# Patient Record
Sex: FEMALE | Race: WHITE | NOT HISPANIC OR LATINO | Employment: OTHER | URBAN - METROPOLITAN AREA
[De-identification: names, ages, dates, MRNs, and addresses within clinical notes are randomized per-mention and may not be internally consistent; named-entity substitution may affect disease eponyms.]

---

## 2017-02-27 ENCOUNTER — GENERIC CONVERSION - ENCOUNTER (OUTPATIENT)
Dept: OTHER | Facility: OTHER | Age: 67
End: 2017-02-27

## 2017-02-27 ENCOUNTER — HOSPITAL ENCOUNTER (OUTPATIENT)
Dept: RADIOLOGY | Facility: HOSPITAL | Age: 67
Discharge: HOME | End: 2017-02-27
Attending: OBSTETRICS & GYNECOLOGY
Payer: MEDICARE

## 2017-02-27 DIAGNOSIS — Z12.39 ENCOUNTER FOR OTHER SCREENING FOR MALIGNANT NEOPLASM OF BREAST: ICD-10-CM

## 2017-02-27 PROCEDURE — G0202 SCR MAMMO BI INCL CAD: HCPCS

## 2018-01-10 NOTE — RESULT NOTES
Verified Results  * MAMMO SCREENING BILATERAL W CAD [id number] 09:55AM [facility] Order Number: [id number]    - Patient Instructions: To schedule this appointment, please contact Central Scheduling at [phone]  Do not wear any perfume, powder, lotion or deodorant on breast or underarm area  Please bring your doctors order, referral (if needed) and insurance information with you on the day of the test  Failure to bring this information may result in this test being rescheduled  Arrive 15 minutes prior to your appointment time to register  On the day of your test, please bring any prior mammogram or breast studies with you that were not performed at a [facility]  Failure to bring prior exams may result in your test needing to be rescheduled  Test Name Result Flag Reference   MAMMO SCREENING BILATERAL W CAD (Report)     Patient History:   Patient is postmenopausal    Family history of breast cancer at age 59 in maternal aunt,    breast cancer at age 79 in maternal grandmother, colorectal    cancer at age 64 in father  Benign excisional biopsy of the left breast, June 1998  Patient has never smoked  Patient's BMI is 28 3  Reason for exam: screening, asymptomatic  Mammo Screening Bilateral W CAD: February 27, 2017 - Check In #:    [de-identified]   Bilateral MLO, CC, and XCCL view(s) were taken  Technologist: Danford Severin, RT(LINDA)(M)   Prior study comparison: February 25, 2016, mammo screening    bilateral W CAD performed at [address] [place] [address]     January 21, 2015, bilateral digital screening mammogram performed   at [address] [place] [address]  January 14, 2014, bilateral    digital screening mammogram performed at [address] [place] [address]      The breast tissue is heterogeneously dense, potentially limiting    the sensitivity of mammography   Patient risk, included in this    report, assists in determining the appropriate screening regimen    (such as 3-D mammography or the inclusion of automated breast    ultrasound or MRI)  3-D mammography may also remain indicated as    screening  No dominant soft tissue mass, architectural distortion or    suspicious calcifications are noted in either breast   The skin    and nipple contours are within normal limits  No evidence of malignancy  No significant changes when compared with prior studies  ACR BI-RADSï¾® Assessments: BiRad:1 - Negative     Recommendation:   Routine screening mammogram of both breasts in 1 year  A    reminder letter will be scheduled  Analyzed by CAD     8-10% of cancers will be missed on mammography  Management of a    palpable abnormality must be based on clinical grounds  Patients   will be notified of their results via letter from our facility  Accredited by [employer] and FDA       Transcription Location: [facility] 98: [id number]     Risk Value(s):   Tyrer-Cuzick 10 Year: 2 100%, Tyrer-Cuzick Lifetime: 4 400%,    Myriad Table: 1 5%, JASVIR 5 Year: 1 7%, NCI Lifetime: 6 5%   Signed by:   Niurka Joshi MD   2/27/17

## 2018-01-13 NOTE — RESULT NOTES
Verified Results  * MAMMO SCREENING BILATERAL W CAD [id number] 10:30AM Javier Fremont     Test Name Result Flag Reference   MAMMO SCREENING BILATERAL W CAD (Report)     Patient History:   Patient is postmenopausal    Family history of colorectal cancer in father at age 64, breast    cancer in maternal aunt at age 59, and breast cancer in maternal    grandmother at age 79  Benign excisional biopsy of the left breast, June 1998  Patient has never smoked  Patient's BMI is 28 3  Reason for exam: screening (asymptomatic)  Mammo Screening Bilateral W CAD: February 25, 2016 - Check In #:    [de-identified]   Bilateral CC and XCCL view(s) were taken  MLO view(s) were taken   of the right breast      Technologist: RT Johann(LINDA)(M)   Prior study comparison: January 21, 2015, bilateral digital    screening mammogram performed at [address] [place] [address]     January 14, 2014, bilateral digital screening mammogram    performed at [address] [place] [address]  November 29, 2012,    bilateral digital screening mammogram performed at [address] [place] [address]  October 25, 2011, bilateral digital    screening mammogram performed at [address] [place] [address]     October 5, 2010, bilateral digital screening mammogram performed   at [address] [place] [address]  September 22, 2009,    bilateral digital screening mammogram performed at [address] [place] [address]  September 9, 2008, bilateral DIGITAL    SCREENING MAMMOGRAM performed at [address] [place] [address]     August 17, 2007, bilateral digital screening mammo w/CAD    performed at [address] [place] [address]      There are scattered fibroglandular densities  No dominant soft tissue mass, architectural distortion or    suspicious calcifications are noted  The skin and nipple    contours are within normal limits  No evidence of malignancy  No significant changes   when compared with prior studies       ASSESSMENT: BiRad:1 - Negative     Recommendation:   Routine screening mammogram of both breasts in 1 year  A    reminder letter will be scheduled  Analyzed by CAD     8-10% of cancers will be missed on mammography  Management of a    palpable abnormality must be based on clinical grounds  Patients   will be notified of their results via letter from our facility  Accredited by [employer] and FDA       Transcription Location: [facility] 98: [id number]     Risk Value(s):   Tyrer-Cuzick 10 Year: 2 146%, Tyrer-Cuzick Lifetime: 4 665%,    Myriad Table: 1 5%, JASVIR 5 Year: 1 7%, NCI Lifetime: 6 7%   Signed by:   Vivian Connell MD   2/25/16

## 2018-08-07 ENCOUNTER — OFFICE VISIT (OUTPATIENT)
Dept: HEMATOLOGY ONCOLOGY | Facility: CLINIC | Age: 68
End: 2018-08-07
Payer: MEDICARE

## 2018-08-07 VITALS
OXYGEN SATURATION: 99 % | RESPIRATION RATE: 17 BRPM | WEIGHT: 144 LBS | HEART RATE: 76 BPM | SYSTOLIC BLOOD PRESSURE: 124 MMHG | TEMPERATURE: 98 F | BODY MASS INDEX: 31.07 KG/M2 | DIASTOLIC BLOOD PRESSURE: 80 MMHG | HEIGHT: 57 IN

## 2018-08-07 DIAGNOSIS — D64.9 ANEMIA, UNSPECIFIED TYPE: Primary | ICD-10-CM

## 2018-08-07 DIAGNOSIS — D47.1 MYELOPROLIFERATIVE DISORDER (HCC): ICD-10-CM

## 2018-08-07 DIAGNOSIS — D75.839 THROMBOCYTOSIS: ICD-10-CM

## 2018-08-07 PROCEDURE — 99204 OFFICE O/P NEW MOD 45 MIN: CPT | Performed by: INTERNAL MEDICINE

## 2018-08-07 RX ORDER — ESOMEPRAZOLE MAGNESIUM 40 MG/1
CAPSULE, DELAYED RELEASE ORAL
COMMUNITY

## 2018-08-07 RX ORDER — AMLODIPINE BESYLATE 5 MG/1
TABLET ORAL
Refills: 0 | COMMUNITY
Start: 2018-07-26 | End: 2020-02-29

## 2018-08-07 RX ORDER — TELMISARTAN 80 MG/1
40 TABLET ORAL
COMMUNITY
End: 2020-04-06

## 2018-08-07 RX ORDER — BACLOFEN 10 MG/1
20 TABLET ORAL
COMMUNITY
End: 2020-04-06 | Stop reason: ALTCHOICE

## 2018-08-07 RX ORDER — B-COMPLEX WITH VITAMIN C
TABLET ORAL
COMMUNITY

## 2018-08-07 RX ORDER — OXYCODONE AND ACETAMINOPHEN 7.5; 325 MG/1; MG/1
TABLET ORAL
Refills: 0 | COMMUNITY
Start: 2018-07-11 | End: 2021-11-08 | Stop reason: ALTCHOICE

## 2018-08-07 RX ORDER — POTASSIUM CHLORIDE 750 MG/1
CAPSULE, EXTENDED RELEASE ORAL
COMMUNITY
End: 2020-04-06 | Stop reason: SDUPTHER

## 2018-08-07 RX ORDER — MORPHINE SULFATE 60 MG/1
TABLET, FILM COATED, EXTENDED RELEASE ORAL
Refills: 0 | COMMUNITY
Start: 2018-07-11 | End: 2020-04-20 | Stop reason: ALTCHOICE

## 2018-08-07 RX ORDER — LEVOTHYROXINE SODIUM 0.05 MG/1
TABLET ORAL
Refills: 3 | COMMUNITY
Start: 2018-07-26 | End: 2020-04-06 | Stop reason: SDUPTHER

## 2018-08-07 RX ORDER — ZOLPIDEM TARTRATE 5 MG/1
TABLET ORAL
COMMUNITY

## 2018-08-07 RX ORDER — ROSUVASTATIN CALCIUM 5 MG/1
TABLET, COATED ORAL
COMMUNITY
End: 2020-04-06 | Stop reason: ALTCHOICE

## 2018-08-07 RX ORDER — CELECOXIB 200 MG/1
CAPSULE ORAL
COMMUNITY
End: 2022-05-09 | Stop reason: SDUPTHER

## 2018-08-07 NOTE — LETTER
August 7, 2018     Chiquis Mcclellan MD  [address] [address]    Patient: Vanda Resendiz   YOB: 1950   Date of Visit: 8/7/2018       Dear Dr Alli Little: Thank you for referring Adithya Merino to me for evaluation  Below are my notes for this consultation  If you have questions, please do not hesitate to call me  I look forward to following your patient along with you  Sincerely,        James Ward MD        CC: DO James Scott MD  8/7/2018  3:01 PM  Sign at close encounter  Consultation - Medical Oncology   Vanda Resendiz 79 y o  female MRN: [id number]  Unit/Bed#:  Encounter: [id number]     Referring physician:  Chiquis Mcclellan  Reason for Consult:   Thrombocytosis  HPI: Vanda Resendiz is a 79y o  year old female   She is here with her   She gives history of high platelet count at least for the last 6 years when she was seen by Dr Claudene Birch in [place]  She did not have any treatment  No history of blood clot or bleeding  There is family history of high platelet counts in her 2 sisters  Patient feels tired  She has osteoarthritis throughout her body and lumbar stenosis  She has back problem for the last 20 years  Surgery was advised 20 years ago  She had injections and was under care of pain management specialist   She ambulates with a walker  Long time ago she had hernia surgery and hammer toe surgery  She had tonsillectomy  In 1985 she had ANN for bleeding  She still has the ovaries  In 1990 a benign lump was removed from left breast   She follows with her gynecologist and he does examination of breasts and also sends her for mammography  In addition she has history of hypertension dyslipidemia and hypo thyroidism      ROS:  08/07/18 Reviewed 13 systems:  Presently no headaches, seizures, dizziness, diplopia, dysphagia, hoarseness, chest pain, palpitations, shortness of breath, cough, hemoptysis, abdominal pain, nausea, vomiting, change in bowel habits, melena, hematuria, fever, chills, bleeding, bone pains, skin rash, weight loss,  weakness, numbness, claudication   No frequent infections  Not unusually sensitive to heat or cold  No swelling of the ankles  No swollen glands  Patient is anxious    No GYN symptoms at present Other symptoms are in HPI        Historical Information   Past Medical History:   Diagnosis Date    Arthritis     Hernia, umbilical     Hypertension     Spinal stenosis      Past Surgical History:   Procedure Laterality Date    FOOT SURGERY      HYSTERECTOMY       Social History   History   Alcohol Use No     History   Drug Use No     History   Smoking Status    Never Smoker   Smokeless Tobacco    Never Used     Family History:   Family History   Problem Relation Age of Onset    Hypertension Mother     Cancer Father          Current Outpatient Prescriptions:     amLODIPine (NORVASC) 5 mg tablet, TK 1 T PO QD, Disp: , Rfl: 0    baclofen 10 mg tablet, Take by mouth, Disp: , Rfl:     Calcium Carbonate-Vitamin D 600-200 MG-UNIT TABS, Take by mouth, Disp: , Rfl:     celecoxib (CELEBREX) 200 mg capsule, Take by mouth, Disp: , Rfl:     Cholecalciferol (VITAMIN D PO), Take 5,000 Units by mouth, Disp: , Rfl:     denosumab (PROLIA) 60 mg/mL, Inject under the skin, Disp: , Rfl:     esomeprazole (NEXIUM) 40 MG capsule, Take by mouth, Disp: , Rfl:     levothyroxine 50 mcg tablet, TK 1 T PO QD UTD, Disp: , Rfl: 3    morphine (MS CONTIN) 60 mg 12 hr tablet, TK 1 T PO Q 8 HOURS PO PRN, Disp: , Rfl: 0    Multiple Vitamins-Minerals (CENTRUM SILVER 50+WOMEN) TABS, Take by mouth, Disp: , Rfl:     oxyCODONE-acetaminophen (PERCOCET) 7 5-325 MG per tablet, TK 1 TO 2 TS PO Q 4 TO 6 H PRN P, Disp: , Rfl: 0    potassium chloride (MICRO-K) 10 MEQ CR capsule, Take by mouth, Disp: , Rfl:     rosuvastatin (CRESTOR) 5 mg tablet, Take by mouth, Disp: , Rfl:     telmisartan (MICARDIS) 80 MG tablet, Take by mouth, Disp: , Rfl:     zolpidem (AMBIEN) 5 mg tablet, Take by mouth, Disp: , Rfl:     Allergies   Allergen Reactions    Aspirin     Ibuprofen      @ ROS@  Physical Exam:  Vitals:    08/07/18 1400   BP: 124/80   BP Location: Right arm   Cuff Size: Adult   Pulse: 76   Resp: 17   Temp: 98 °F (36 7 °C)   TempSrc: Tympanic   SpO2: 99%   Weight: 65 3 kg (144 lb)   Height: 4' 9" (1 448 m)     Alert, oriented, not in distress, no icterus, no oral thrush, no palpable neck mass, clear lung fields, regular heart rate, abdomen  soft and non tender, no palpable abdominal mass, no ascites, no edema of ankles, no calf tenderness, no focal neurological deficit, no skin rash, no palpable lymphadenopathy, good arterial pulses, no clubbing  Patient is anxious  Performance status  3  She ambulates with a walker  Lab Results: I have reviewed all pertinent labs  LABS:   iIn October 2017 hemoglobin 10 7  MCV 91 2  WBC 7700  Platelets [id number]  Imaging Studies: I have personally reviewed pertinent reports  Pathology, and Other Studies: I have personally reviewed pertinent reports  Assessment and Plan:    Thrombocytosis and possibility of myeloproliferative disorder  Anemia  Discussed thrombocytosis in detail, primary versus secondary and causes of secondary thrombocytosis and in general terms discussed treatment for primary thrombocytosis that is what I am  Suspecting, primary thrombocytosis  See orders  CT scan and not ultrasound of the abdomen because she will not be able to lie on her back for too long and ultra sound takes longer than CT scan  All discussed in detail  Questions answered  If  primary thrombocytosis then goal will be to bring platelet count close to 400,000  See below  1  Anemia, unspecified type    - CBC and differential; Future  - Comprehensive metabolic panel; Future  - C-reactive protein; Future  - Sedimentation rate, automated; Future  - LD,Blood; Future  - Iron Panel;  Future  - Reticulocytes; Future  - Occult Bloood,Fecal Immunochemical; Future    2  Thrombocytosis (HCC)    - CBC and differential; Future  - Comprehensive metabolic panel; Future  - C-reactive protein; Future  - Sedimentation rate, automated; Future  - LD,Blood; Future  - CT chest abdomen pelvis wo contrast; Future  - JAK2 V617F,Ql,W/RFL Exons 12,13 and MPL X916,A037; Future    3  Myeloproliferative disorder (HCC)    - CBC and differential; Future  - Comprehensive metabolic panel; Future  - C-reactive protein; Future  - Sedimentation rate, automated; Future  - LD,Blood; Future  - CT chest abdomen pelvis wo contrast; Future  - JAK2 V617F,Ql,W/RFL Exons 12,13 and MPL S571,W151; Future    Patient voiced understanding and agreement in the discussion  Counseling / Coordination of Care: 75 min    Greater than 50% of total time was spent with the patient and / or family counseling and / or coordination of care

## 2018-08-07 NOTE — PROGRESS NOTES
Consultation - Medical Oncology   Edmundo Singleton 79 y o  female MRN: [id number]  Unit/Bed#:  Encounter: [id number]     Referring physician:  Bandar Koenig  Reason for Consult:   Thrombocytosis  HPI: Edmundo Singleton is a 79y o  year old female   She is here with her   She gives history of high platelet count at least for the last 6 years when she was seen by Dr Arsenio Self in [place]  She did not have any treatment  No history of blood clot or bleeding  There is family history of high platelet counts in her 2 sisters  Patient feels tired  She has osteoarthritis throughout her body and lumbar stenosis  She has back problem for the last 20 years  Surgery was advised 20 years ago  She had injections and was under care of pain management specialist   She ambulates with a walker  Long time ago she had hernia surgery and hammer toe surgery  She had tonsillectomy  In 1985 she had ANN for bleeding  She still has the ovaries  In 1990 a benign lump was removed from left breast   She follows with her gynecologist and he does examination of breasts and also sends her for mammography  In addition she has history of hypertension dyslipidemia and hypo thyroidism  ROS:  08/07/18 Reviewed 13 systems:  Presently no headaches, seizures, dizziness, diplopia, dysphagia, hoarseness, chest pain, palpitations, shortness of breath, cough, hemoptysis, abdominal pain, nausea, vomiting, change in bowel habits, melena, hematuria, fever, chills, bleeding, bone pains, skin rash, weight loss,  weakness, numbness, claudication   No frequent infections  Not unusually sensitive to heat or cold  No swelling of the ankles  No swollen glands  Patient is anxious    No GYN symptoms at present Other symptoms are in HPI        Historical Information   Past Medical History:   Diagnosis Date    Arthritis     Hernia, umbilical     Hypertension     Spinal stenosis      Past Surgical History:   Procedure Laterality Date    FOOT SURGERY      HYSTERECTOMY       Social History   History   Alcohol Use No     History   Drug Use No     History   Smoking Status    Never Smoker   Smokeless Tobacco    Never Used     Family History:   Family History   Problem Relation Age of Onset    Hypertension Mother     Cancer Father          Current Outpatient Prescriptions:     amLODIPine (NORVASC) 5 mg tablet, TK 1 T PO QD, Disp: , Rfl: 0    baclofen 10 mg tablet, Take by mouth, Disp: , Rfl:     Calcium Carbonate-Vitamin D 600-200 MG-UNIT TABS, Take by mouth, Disp: , Rfl:     celecoxib (CELEBREX) 200 mg capsule, Take by mouth, Disp: , Rfl:     Cholecalciferol (VITAMIN D PO), Take 5,000 Units by mouth, Disp: , Rfl:     denosumab (PROLIA) 60 mg/mL, Inject under the skin, Disp: , Rfl:     esomeprazole (NEXIUM) 40 MG capsule, Take by mouth, Disp: , Rfl:     levothyroxine 50 mcg tablet, TK 1 T PO QD UTD, Disp: , Rfl: 3    morphine (MS CONTIN) 60 mg 12 hr tablet, TK 1 T PO Q 8 HOURS PO PRN, Disp: , Rfl: 0    Multiple Vitamins-Minerals (CENTRUM SILVER 50+WOMEN) TABS, Take by mouth, Disp: , Rfl:     oxyCODONE-acetaminophen (PERCOCET) 7 5-325 MG per tablet, TK 1 TO 2 TS PO Q 4 TO 6 H PRN P, Disp: , Rfl: 0    potassium chloride (MICRO-K) 10 MEQ CR capsule, Take by mouth, Disp: , Rfl:     rosuvastatin (CRESTOR) 5 mg tablet, Take by mouth, Disp: , Rfl:     telmisartan (MICARDIS) 80 MG tablet, Take by mouth, Disp: , Rfl:     zolpidem (AMBIEN) 5 mg tablet, Take by mouth, Disp: , Rfl:     Allergies   Allergen Reactions    Aspirin     Ibuprofen      @ ROS@  Physical Exam:  Vitals:    08/07/18 1400   BP: 124/80   BP Location: Right arm   Cuff Size: Adult   Pulse: 76   Resp: 17   Temp: 98 °F (36 7 °C)   TempSrc: Tympanic   SpO2: 99%   Weight: 65 3 kg (144 lb)   Height: 4' 9" (1 448 m)     Alert, oriented, not in distress, no icterus, no oral thrush, no palpable neck mass, clear lung fields, regular heart rate, abdomen  soft and non tender, no palpable abdominal mass, no ascites, no edema of ankles, no calf tenderness, no focal neurological deficit, no skin rash, no palpable lymphadenopathy, good arterial pulses, no clubbing  Patient is anxious  Performance status  3  She ambulates with a walker  Lab Results: I have reviewed all pertinent labs  LABS:   iIn October 2017 hemoglobin 10 7  MCV 91 2  WBC 7700  Platelets [id number]  Imaging Studies: I have personally reviewed pertinent reports  Pathology, and Other Studies: I have personally reviewed pertinent reports  Assessment and Plan:    Thrombocytosis and possibility of myeloproliferative disorder  Anemia  Discussed thrombocytosis in detail, primary versus secondary and causes of secondary thrombocytosis and in general terms discussed treatment for primary thrombocytosis that is what I am  Suspecting, primary thrombocytosis  See orders  CT scan and not ultrasound of the abdomen because she will not be able to lie on her back for too long and ultra sound takes longer than CT scan  All discussed in detail  Questions answered  If  primary thrombocytosis then goal will be to bring platelet count close to 400,000  See below  1  Anemia, unspecified type    - CBC and differential; Future  - Comprehensive metabolic panel; Future  - C-reactive protein; Future  - Sedimentation rate, automated; Future  - LD,Blood; Future  - Iron Panel; Future  - Reticulocytes; Future  - Occult Bloood,Fecal Immunochemical; Future    2  Thrombocytosis (HCC)    - CBC and differential; Future  - Comprehensive metabolic panel; Future  - C-reactive protein; Future  - Sedimentation rate, automated; Future  - LD,Blood; Future  - CT chest abdomen pelvis wo contrast; Future  - JAK2 V617F,Ql,W/RFL Exons 12,13 and MPL G793,T071; Future    3  Myeloproliferative disorder (HCC)    - CBC and differential; Future  - Comprehensive metabolic panel; Future  - C-reactive protein; Future  - Sedimentation rate, automated;  Future  - LD,Blood; Future  - CT chest abdomen pelvis wo contrast; Future  - JAK2 V617F,Ql,W/RFL Exons 12,13 and MPL C915,V231; Future    Patient voiced understanding and agreement in the discussion  Counseling / Coordination of Care: 75 min    Greater than 50% of total time was spent with the patient and / or family counseling and / or coordination of care

## 2018-08-17 ENCOUNTER — APPOINTMENT (OUTPATIENT)
Dept: LAB | Facility: CLINIC | Age: 68
End: 2018-08-17
Payer: MEDICARE

## 2018-08-17 ENCOUNTER — HOSPITAL ENCOUNTER (OUTPATIENT)
Dept: CT IMAGING | Facility: HOSPITAL | Age: 68
Discharge: HOME | End: 2018-08-17
Attending: INTERNAL MEDICINE
Payer: MEDICARE

## 2018-08-17 ENCOUNTER — TRANSCRIBE ORDERS (OUTPATIENT)
Dept: LAB | Facility: CLINIC | Age: 68
End: 2018-08-17

## 2018-08-17 DIAGNOSIS — D64.9 ANEMIA, UNSPECIFIED TYPE: ICD-10-CM

## 2018-08-17 DIAGNOSIS — D75.839 THROMBOCYTOSIS: ICD-10-CM

## 2018-08-17 DIAGNOSIS — D47.1 MYELOPROLIFERATIVE DISORDER (HCC): ICD-10-CM

## 2018-08-17 LAB
ALBUMIN SERPL BCP-MCNC: 3 G/DL (ref 3.5–5)
ALP SERPL-CCNC: 145 U/L (ref 46–116)
ALT SERPL W P-5'-P-CCNC: 58 U/L (ref 12–78)
ANION GAP SERPL CALCULATED.3IONS-SCNC: 12 MMOL/L (ref 4–13)
AST SERPL W P-5'-P-CCNC: 52 U/L (ref 5–45)
BASOPHILS # BLD AUTO: 0.05 THOUSANDS/ΜL (ref 0–0.1)
BASOPHILS NFR BLD AUTO: 1 % (ref 0–1)
BILIRUB SERPL-MCNC: 0.4 MG/DL (ref 0.2–1)
BUN SERPL-MCNC: 37 MG/DL (ref 5–25)
CALCIUM SERPL-MCNC: 8.6 MG/DL (ref 8.3–10.1)
CHLORIDE SERPL-SCNC: 104 MMOL/L (ref 100–108)
CO2 SERPL-SCNC: 22 MMOL/L (ref 21–32)
CREAT SERPL-MCNC: 1.38 MG/DL (ref 0.6–1.3)
CRP SERPL QL: 28 MG/L
EOSINOPHIL # BLD AUTO: 0.17 THOUSAND/ΜL (ref 0–0.61)
EOSINOPHIL NFR BLD AUTO: 2 % (ref 0–6)
ERYTHROCYTE [DISTWIDTH] IN BLOOD BY AUTOMATED COUNT: 15.8 % (ref 11.6–15.1)
ERYTHROCYTE [SEDIMENTATION RATE] IN BLOOD: 50 MM/HOUR (ref 0–20)
FERRITIN SERPL-MCNC: 182 NG/ML (ref 8–388)
GFR SERPL CREATININE-BSD FRML MDRD: 40 ML/MIN/1.73SQ M
GLUCOSE P FAST SERPL-MCNC: 97 MG/DL (ref 65–99)
HCT VFR BLD AUTO: 35.3 % (ref 34.8–46.1)
HEMOCCULT STL QL IA: NEGATIVE
HGB BLD-MCNC: 11.2 G/DL (ref 11.5–15.4)
IMM GRANULOCYTES # BLD AUTO: 0.04 THOUSAND/UL (ref 0–0.2)
IMM GRANULOCYTES NFR BLD AUTO: 0 % (ref 0–2)
IRON SATN MFR SERPL: 10 %
IRON SERPL-MCNC: 31 UG/DL (ref 50–170)
LDH SERPL-CCNC: 462 U/L (ref 81–234)
LYMPHOCYTES # BLD AUTO: 2 THOUSANDS/ΜL (ref 0.6–4.47)
LYMPHOCYTES NFR BLD AUTO: 21 % (ref 14–44)
MCH RBC QN AUTO: 28.6 PG (ref 26.8–34.3)
MCHC RBC AUTO-ENTMCNC: 31.7 G/DL (ref 31.4–37.4)
MCV RBC AUTO: 90 FL (ref 82–98)
MONOCYTES # BLD AUTO: 0.71 THOUSAND/ΜL (ref 0.17–1.22)
MONOCYTES NFR BLD AUTO: 8 % (ref 4–12)
NEUTROPHILS # BLD AUTO: 6.45 THOUSANDS/ΜL (ref 1.85–7.62)
NEUTS SEG NFR BLD AUTO: 68 % (ref 43–75)
NRBC BLD AUTO-RTO: 0 /100 WBCS
PLATELET # BLD AUTO: 673 THOUSANDS/UL (ref 149–390)
PMV BLD AUTO: 8.3 FL (ref 8.9–12.7)
POTASSIUM SERPL-SCNC: 4.1 MMOL/L (ref 3.5–5.3)
PROT SERPL-MCNC: 7.6 G/DL (ref 6.4–8.2)
RBC # BLD AUTO: 3.92 MILLION/UL (ref 3.81–5.12)
RETICS # AUTO: NORMAL 10*3/UL (ref 14097–95744)
RETICS # CALC: 0.84 % (ref 0.37–1.87)
SODIUM SERPL-SCNC: 138 MMOL/L (ref 136–145)
TIBC SERPL-MCNC: 301 UG/DL (ref 250–450)
WBC # BLD AUTO: 9.42 THOUSAND/UL (ref 4.31–10.16)

## 2018-08-17 PROCEDURE — 83615 LACTATE (LD) (LDH) ENZYME: CPT

## 2018-08-17 PROCEDURE — 81270 JAK2 GENE: CPT

## 2018-08-17 PROCEDURE — 83550 IRON BINDING TEST: CPT

## 2018-08-17 PROCEDURE — 74176 CT ABD & PELVIS W/O CONTRAST: CPT

## 2018-08-17 PROCEDURE — 81402 MOPATH PROCEDURE LEVEL 3: CPT

## 2018-08-17 PROCEDURE — G0328 FECAL BLOOD SCRN IMMUNOASSAY: HCPCS

## 2018-08-17 PROCEDURE — 85652 RBC SED RATE AUTOMATED: CPT

## 2018-08-17 PROCEDURE — 82728 ASSAY OF FERRITIN: CPT

## 2018-08-17 PROCEDURE — 71250 CT THORAX DX C-: CPT

## 2018-08-17 PROCEDURE — 88374 M/PHMTRC ALYS ISHQUANT/SEMIQ: CPT

## 2018-08-17 PROCEDURE — 80053 COMPREHEN METABOLIC PANEL: CPT

## 2018-08-17 PROCEDURE — 81403 MOPATH PROCEDURE LEVEL 4: CPT

## 2018-08-17 PROCEDURE — 86140 C-REACTIVE PROTEIN: CPT

## 2018-08-17 PROCEDURE — 83540 ASSAY OF IRON: CPT

## 2018-08-17 PROCEDURE — 85025 COMPLETE CBC W/AUTO DIFF WBC: CPT

## 2018-08-17 PROCEDURE — 85045 AUTOMATED RETICULOCYTE COUNT: CPT

## 2018-08-17 PROCEDURE — 81219 CALR GENE COM VARIANTS: CPT

## 2018-08-17 PROCEDURE — 36415 COLL VENOUS BLD VENIPUNCTURE: CPT

## 2018-08-21 ENCOUNTER — TELEPHONE (OUTPATIENT)
Dept: HEMATOLOGY ONCOLOGY | Facility: CLINIC | Age: 68
End: 2018-08-21

## 2018-08-21 NOTE — TELEPHONE ENCOUNTER
Per Dr Jose Morales, patient should have an abdominal MRI with contrast done in 3 to 6 months time  Patient did have a Cr level of 1 38 on 8/17/18  Will follow up with a Radiologist tomorrow if it is safe to use IV contrast with a Cr level of 1 38

## 2018-08-21 NOTE — TELEPHONE ENCOUNTER
Lauren Loza from the radiology called to say there is significant findings on Pt report  Just FYI  Its in epic

## 2018-08-22 DIAGNOSIS — D47.1 MYELOPROLIFERATIVE DISORDER (HCC): ICD-10-CM

## 2018-08-22 DIAGNOSIS — R79.89 ELEVATED SERUM CREATININE: ICD-10-CM

## 2018-08-22 DIAGNOSIS — R16.0 LIVER MASS, RIGHT LOBE: Primary | ICD-10-CM

## 2018-08-22 NOTE — TELEPHONE ENCOUNTER
Called and spoke with the patient regarding her 1950 CT chest/abd/pelvis results  Patient has a indeterminate 7 mm mass in her right liver lobe  Dr Vivek Doe would like the patient to have an abdominal MRI with contrast done to further assess this liver mass  Patient did have a 8/17/18 Cr level of 1 38  Patient has no known kidney disease and has been trying to stay well hydrated  I spoke with a  Radiologist that stated the patient's GFR of 40 should be ok to treat with a Cr level of 1 38 as long as she has no known kidney disease and she is drinking lots of fluids  Patient will be scheduled for the MRI preferably before her 8/30/18 appointment  Patient instructed to have a repeat CMP done 1 week prior to the MRI and to continue drinking lots of fluids  Patient verbally understood

## 2018-08-23 LAB — SCAN RESULT: NORMAL

## 2018-08-24 LAB — MISCELLANEOUS LAB TEST RESULT: NORMAL

## 2018-08-28 ENCOUNTER — APPOINTMENT (OUTPATIENT)
Dept: LAB | Facility: CLINIC | Age: 68
End: 2018-08-28
Payer: MEDICARE

## 2018-08-28 DIAGNOSIS — R79.89 ELEVATED SERUM CREATININE: ICD-10-CM

## 2018-08-28 DIAGNOSIS — R16.0 LIVER MASS, RIGHT LOBE: ICD-10-CM

## 2018-08-28 LAB
ALBUMIN SERPL BCP-MCNC: 2.9 G/DL (ref 3.5–5)
ALP SERPL-CCNC: 128 U/L (ref 46–116)
ALT SERPL W P-5'-P-CCNC: 29 U/L (ref 12–78)
ANION GAP SERPL CALCULATED.3IONS-SCNC: 11 MMOL/L (ref 4–13)
AST SERPL W P-5'-P-CCNC: 17 U/L (ref 5–45)
BILIRUB SERPL-MCNC: 0.4 MG/DL (ref 0.2–1)
BUN SERPL-MCNC: 19 MG/DL (ref 5–25)
CALCIUM SERPL-MCNC: 8.7 MG/DL (ref 8.3–10.1)
CHLORIDE SERPL-SCNC: 104 MMOL/L (ref 100–108)
CO2 SERPL-SCNC: 23 MMOL/L (ref 21–32)
CREAT SERPL-MCNC: 1.2 MG/DL (ref 0.6–1.3)
GFR SERPL CREATININE-BSD FRML MDRD: 47 ML/MIN/1.73SQ M
GLUCOSE P FAST SERPL-MCNC: 93 MG/DL (ref 65–99)
POTASSIUM SERPL-SCNC: 4.9 MMOL/L (ref 3.5–5.3)
PROT SERPL-MCNC: 7 G/DL (ref 6.4–8.2)
SODIUM SERPL-SCNC: 138 MMOL/L (ref 136–145)

## 2018-08-28 PROCEDURE — 80053 COMPREHEN METABOLIC PANEL: CPT

## 2018-08-28 PROCEDURE — 36415 COLL VENOUS BLD VENIPUNCTURE: CPT

## 2018-09-04 ENCOUNTER — HOSPITAL ENCOUNTER (OUTPATIENT)
Dept: RADIOLOGY | Facility: HOSPITAL | Age: 68
Discharge: HOME | End: 2018-09-04
Attending: INTERNAL MEDICINE
Payer: MEDICARE

## 2018-09-04 DIAGNOSIS — D47.1 MYELOPROLIFERATIVE DISORDER (HCC): ICD-10-CM

## 2018-09-04 DIAGNOSIS — R16.0 LIVER MASS, RIGHT LOBE: ICD-10-CM

## 2018-09-04 PROCEDURE — A9585 GADOBUTROL INJECTION: HCPCS | Performed by: INTERNAL MEDICINE

## 2018-09-04 PROCEDURE — 74183 MRI ABD W/O CNTR FLWD CNTR: CPT

## 2018-09-04 RX ADMIN — GADOBUTROL 6 ML: 604.72 INJECTION INTRAVENOUS at 21:15

## 2018-09-12 ENCOUNTER — OFFICE VISIT (OUTPATIENT)
Dept: HEMATOLOGY ONCOLOGY | Facility: CLINIC | Age: 68
End: 2018-09-12
Payer: MEDICARE

## 2018-09-12 VITALS
SYSTOLIC BLOOD PRESSURE: 110 MMHG | HEART RATE: 99 BPM | DIASTOLIC BLOOD PRESSURE: 80 MMHG | RESPIRATION RATE: 18 BRPM | OXYGEN SATURATION: 98 % | TEMPERATURE: 97.2 F

## 2018-09-12 DIAGNOSIS — D63.8 ANEMIA IN OTHER CHRONIC DISEASES CLASSIFIED ELSEWHERE: ICD-10-CM

## 2018-09-12 DIAGNOSIS — D75.839 THROMBOCYTOSIS: Primary | ICD-10-CM

## 2018-09-12 PROCEDURE — 99214 OFFICE O/P EST MOD 30 MIN: CPT | Performed by: PHYSICIAN ASSISTANT

## 2018-09-12 NOTE — LETTER
September 12, 2018     Yuki Hearn MD  [address]  [address] [place] [address]    Patient: Lolis Monroe   YOB: 1950   Date of Visit: 9/12/2018       Dear Dr Ayden Burdick:    Thank you for referring Dora Bowman to me for evaluation  Below are my notes for this consultation  If you have questions, please do not hesitate to call me  I look forward to following your patient along with you  Sincerely,        Lisa Gallardo PA-C        CC: No Recipients  Chapincito Verdin  9/12/2018  6:15 PM  Sign at close encounter  Hematology/Oncology Outpatient Follow-up  Lolis Monroe 79 y o  female 1950 [id number]    Date:  9/12/2018      Assessment and Plan:    1  Thrombocytosis [facility])   62-year-old female with history of thrombocytosis  Over the past year her platelet count has been in the 600 range  Patient's workup showed a negative JAK2 mutation, negative bcr/ABL  Reticulocyte count was normal   Iron panel showed levels consistent of chronic disease  She has sufficient iron stores  Occult stool testing was negative  CMP showed slight elevation in alkaline phosphatase, 128  Sed rate and CRP were elevated  CRP was 28, sed rate 50  Discussed with patient that the cause for her thrombocytosis is secondary, to that of chronic underlying inflammation  She does follow with her rheumatologist   She was advised to follow up with her rheumatologist in regards to these abnormal inflammatory markers  Explained to patient that treatment is not required for her secondary thrombocytosis  Our only recommendation would be aspirin therapy 81 mg p o  daily  However, patient it has an allergy to aspirin, hives  She also states that she had a history of a bleeding ulcer many years ago  - CBC and differential; Standing  - CBC and differential    2   Anemia in other chronic diseases classified elsewhere    Patient's anemia, hemoglobin 11 2, is also related to her chronic disease / underlying inflammatory state  She is not symptomatic in regards to this  Observation is appropriate     - CBC and differential; Standing  - CBC and differential    HPI:  73-year-old female with history of thrombocytosis  She has had this for at least 6 years  She previously followed with a different hematologist, Dr Preethi Mathur  She has not required any treatment for her thrombocytosis  She does not have history of blood clot or bleeding  Her sisters both have a history of elevated platelets as well  Patient has osteoarthritis for which she follows with a rheumatologist       She has history of TH in 1985 for bleeding  She still has her ovaries  In 1990 she had a benign lump removed from her breast   She follows with a gynecologist who does examination of her breast as well as orders her mammography  Interval history:    ROS: Review of Systems   Constitutional: Negative for appetite change, chills, fatigue, fever and unexpected weight change  HENT: Negative for nosebleeds  Respiratory: Negative for shortness of breath  Gastrointestinal: Negative for abdominal pain, constipation and diarrhea  Genitourinary: Negative for difficulty urinating  Musculoskeletal: Positive for arthralgias, back pain and gait problem (Ambulates with a walker)  Skin: Negative  Neurological: Negative for dizziness, light-headedness and headaches  Hematological: Negative          Past Medical History:   Diagnosis Date    Arthritis     Hernia, umbilical     Hypertension     Spinal stenosis        Past Surgical History:   Procedure Laterality Date    FOOT SURGERY      HYSTERECTOMY         Social History     Social History    Marital status: /Civil Union     Spouse name: N/A    Number of children: N/A    Years of education: N/A     Social History Main Topics    Smoking status: Never Smoker    Smokeless tobacco: Never Used    Alcohol use No    Drug use: No    Sexual activity: Not on file     Other Topics Concern    Not on file     Social History Narrative    No narrative on file       Family History   Problem Relation Age of Onset    Hypertension Mother     Cancer Father        Allergies   Allergen Reactions    Aspirin     Ibuprofen          Current Outpatient Prescriptions:     baclofen 10 mg tablet, Take by mouth, Disp: , Rfl:     Calcium Carbonate-Vitamin D 600-200 MG-UNIT TABS, Take by mouth, Disp: , Rfl:     celecoxib (CELEBREX) 200 mg capsule, Take by mouth, Disp: , Rfl:     Cholecalciferol (VITAMIN D PO), Take 5,000 Units by mouth, Disp: , Rfl:     denosumab (PROLIA) 60 mg/mL, Inject under the skin, Disp: , Rfl:     esomeprazole (NEXIUM) 40 MG capsule, Take by mouth, Disp: , Rfl:     levothyroxine 50 mcg tablet, TK 1 T PO QD UTD, Disp: , Rfl: 3    morphine (MS CONTIN) 60 mg 12 hr tablet, TK 1 T PO Q 8 HOURS PO PRN, Disp: , Rfl: 0    Multiple Vitamins-Minerals (CENTRUM SILVER 50+WOMEN) TABS, Take by mouth, Disp: , Rfl:     oxyCODONE-acetaminophen (PERCOCET) 7 5-325 MG per tablet, 1 tablet 3 times a day, Disp: , Rfl: 0    potassium chloride (MICRO-K) 10 MEQ CR capsule, Take by mouth, Disp: , Rfl:     rosuvastatin (CRESTOR) 5 mg tablet, Take by mouth, Disp: , Rfl:     telmisartan (MICARDIS) 80 MG tablet, Take by mouth, Disp: , Rfl:     zolpidem (AMBIEN) 5 mg tablet, Take by mouth, Disp: , Rfl:     amLODIPine (NORVASC) 5 mg tablet, TK 1 T PO QD, Disp: , Rfl: 0      Physical Exam:  /80 (BP Location: Left arm, Patient Position: Sitting, Cuff Size: Standard)   Pulse 99   Temp (!) 97 2 °F (36 2 °C) (Tympanic)   Resp 18   SpO2 98%     Physical Exam   Constitutional: She is oriented to person, place, and time  She appears well-developed and well-nourished  No distress  HENT:   Head: Normocephalic and atraumatic  Eyes: No scleral icterus  Neck: Normal range of motion  Neck supple  Cardiovascular: Normal rate, regular rhythm and normal heart sounds  No murmur heard  Pulmonary/Chest: Effort normal and breath sounds normal  No respiratory distress  Abdominal: Soft  There is no tenderness  Musculoskeletal:   Patient has kyphosis, ambulates with walker   Neurological: She is alert and oriented to person, place, and time  No cranial nerve deficit  Skin: Skin is warm and dry  Psychiatric: She has a normal mood and affect  Vitals reviewed  Labs:  Lab Results   Component Value Date    WBC 9 42 08/17/2018    HGB 11 2 (L) 08/17/2018    HCT 35 3 08/17/2018    MCV 90 08/17/2018     (H) 08/17/2018     Lab Results   Component Value Date     08/28/2018    K 4 9 08/28/2018     08/28/2018    CO2 23 08/28/2018    BUN 19 08/28/2018    CREATININE 1 20 08/28/2018    GLUF 93 08/28/2018    CALCIUM 8 7 08/28/2018    AST 17 08/28/2018    ALT 29 08/28/2018    ALKPHOS 128 (H) 08/28/2018    EGFR 47 08/28/2018       Patient voiced understanding and agreement in the above discussion  Aware to contact our office with questions/symptoms in the interim

## 2018-09-12 NOTE — PROGRESS NOTES
Hematology/Oncology Outpatient Follow-up  Marianne Lagunas 79 y o  female 1950 [id number]    Date:  9/12/2018      Assessment and Plan:    1  Thrombocytosis [facility])   61-year-old female with history of thrombocytosis  Over the past year her platelet count has been in the 600 range  Patient's workup showed a negative JAK2 mutation, negative bcr/ABL  Reticulocyte count was normal   Iron panel showed levels consistent of chronic disease  She has sufficient iron stores  Occult stool testing was negative  CMP showed slight elevation in alkaline phosphatase, 128  Sed rate and CRP were elevated  CRP was 28, sed rate 50  Discussed with patient that the cause for her thrombocytosis is secondary, to that of chronic underlying inflammation  She does follow with her rheumatologist   She was advised to follow up with her rheumatologist in regards to these abnormal inflammatory markers  Explained to patient that treatment is not required for her secondary thrombocytosis  Our only recommendation would be aspirin therapy 81 mg p o  daily  However, patient it has an allergy to aspirin, hives  She also states that she had a history of a bleeding ulcer many years ago  - CBC and differential; Standing  - CBC and differential    2  Anemia in other chronic diseases classified elsewhere    Patient's anemia, hemoglobin 11 2, is also related to her chronic disease / underlying inflammatory state  She is not symptomatic in regards to this  Observation is appropriate     - CBC and differential; Standing  - CBC and differential    HPI:  61-year-old female with history of thrombocytosis  She has had this for at least 6 years  She previously followed with a different hematologist, Dr Mckenna Peace  She has not required any treatment for her thrombocytosis  She does not have history of blood clot or bleeding  Her sisters both have a history of elevated platelets as well        Patient has osteoarthritis for which she follows with a rheumatologist       She has history of TH in 1985 for bleeding  She still has her ovaries  In 1990 she had a benign lump removed from her breast   She follows with a gynecologist who does examination of her breast as well as orders her mammography  Interval history:    ROS: Review of Systems   Constitutional: Negative for appetite change, chills, fatigue, fever and unexpected weight change  HENT: Negative for nosebleeds  Respiratory: Negative for shortness of breath  Gastrointestinal: Negative for abdominal pain, constipation and diarrhea  Genitourinary: Negative for difficulty urinating  Musculoskeletal: Positive for arthralgias, back pain and gait problem (Ambulates with a walker)  Skin: Negative  Neurological: Negative for dizziness, light-headedness and headaches  Hematological: Negative          Past Medical History:   Diagnosis Date    Arthritis     Hernia, umbilical     Hypertension     Spinal stenosis        Past Surgical History:   Procedure Laterality Date    FOOT SURGERY      HYSTERECTOMY         Social History     Social History    Marital status: /Civil Union     Spouse name: N/A    Number of children: N/A    Years of education: N/A     Social History Main Topics    Smoking status: Never Smoker    Smokeless tobacco: Never Used    Alcohol use No    Drug use: No    Sexual activity: Not on file     Other Topics Concern    Not on file     Social History Narrative    No narrative on file       Family History   Problem Relation Age of Onset    Hypertension Mother     Cancer Father        Allergies   Allergen Reactions    Aspirin     Ibuprofen          Current Outpatient Prescriptions:     baclofen 10 mg tablet, Take by mouth, Disp: , Rfl:     Calcium Carbonate-Vitamin D 600-200 MG-UNIT TABS, Take by mouth, Disp: , Rfl:     celecoxib (CELEBREX) 200 mg capsule, Take by mouth, Disp: , Rfl:     Cholecalciferol (VITAMIN D PO), Take 5,000 Units by mouth, Disp: , Rfl:     denosumab (PROLIA) 60 mg/mL, Inject under the skin, Disp: , Rfl:     esomeprazole (NEXIUM) 40 MG capsule, Take by mouth, Disp: , Rfl:     levothyroxine 50 mcg tablet, TK 1 T PO QD UTD, Disp: , Rfl: 3    morphine (MS CONTIN) 60 mg 12 hr tablet, TK 1 T PO Q 8 HOURS PO PRN, Disp: , Rfl: 0    Multiple Vitamins-Minerals (CENTRUM SILVER 50+WOMEN) TABS, Take by mouth, Disp: , Rfl:     oxyCODONE-acetaminophen (PERCOCET) 7 5-325 MG per tablet, 1 tablet 3 times a day, Disp: , Rfl: 0    potassium chloride (MICRO-K) 10 MEQ CR capsule, Take by mouth, Disp: , Rfl:     rosuvastatin (CRESTOR) 5 mg tablet, Take by mouth, Disp: , Rfl:     telmisartan (MICARDIS) 80 MG tablet, Take by mouth, Disp: , Rfl:     zolpidem (AMBIEN) 5 mg tablet, Take by mouth, Disp: , Rfl:     amLODIPine (NORVASC) 5 mg tablet, TK 1 T PO QD, Disp: , Rfl: 0      Physical Exam:  /80 (BP Location: Left arm, Patient Position: Sitting, Cuff Size: Standard)   Pulse 99   Temp (!) 97 2 °F (36 2 °C) (Tympanic)   Resp 18   SpO2 98%     Physical Exam   Constitutional: She is oriented to person, place, and time  She appears well-developed and well-nourished  No distress  HENT:   Head: Normocephalic and atraumatic  Eyes: No scleral icterus  Neck: Normal range of motion  Neck supple  Cardiovascular: Normal rate, regular rhythm and normal heart sounds  No murmur heard  Pulmonary/Chest: Effort normal and breath sounds normal  No respiratory distress  Abdominal: Soft  There is no tenderness  Musculoskeletal:   Patient has kyphosis, ambulates with walker   Neurological: She is alert and oriented to person, place, and time  No cranial nerve deficit  Skin: Skin is warm and dry  Psychiatric: She has a normal mood and affect  Vitals reviewed          Labs:  Lab Results   Component Value Date    WBC 9 42 08/17/2018    HGB 11 2 (L) 08/17/2018    HCT 35 3 08/17/2018    MCV 90 08/17/2018     (H) 08/17/2018 Lab Results   Component Value Date     08/28/2018    K 4 9 08/28/2018     08/28/2018    CO2 23 08/28/2018    BUN 19 08/28/2018    CREATININE 1 20 08/28/2018    GLUF 93 08/28/2018    CALCIUM 8 7 08/28/2018    AST 17 08/28/2018    ALT 29 08/28/2018    ALKPHOS 128 (H) 08/28/2018    EGFR 47 08/28/2018       Patient voiced understanding and agreement in the above discussion  Aware to contact our office with questions/symptoms in the interim

## 2018-10-01 ENCOUNTER — TRANSCRIBE ORDERS (OUTPATIENT)
Dept: LAB | Facility: CLINIC | Age: 68
End: 2018-10-01

## 2018-10-01 ENCOUNTER — APPOINTMENT (OUTPATIENT)
Dept: LAB | Facility: CLINIC | Age: 68
End: 2018-10-01
Payer: MEDICARE

## 2018-10-01 DIAGNOSIS — M48.061 SPINAL STENOSIS, LUMBAR REGION, WITHOUT NEUROGENIC CLAUDICATION: ICD-10-CM

## 2018-10-01 DIAGNOSIS — M48.061 SPINAL STENOSIS, LUMBAR REGION, WITHOUT NEUROGENIC CLAUDICATION: Primary | ICD-10-CM

## 2018-10-01 LAB
ALBUMIN SERPL BCP-MCNC: 3.3 G/DL (ref 3.5–5)
ALP SERPL-CCNC: 74 U/L (ref 46–116)
ALT SERPL W P-5'-P-CCNC: 21 U/L (ref 12–78)
AST SERPL W P-5'-P-CCNC: 12 U/L (ref 5–45)
BASOPHILS # BLD AUTO: 0.02 THOUSANDS/ΜL (ref 0–0.1)
BASOPHILS NFR BLD AUTO: 0 % (ref 0–1)
BILIRUB DIRECT SERPL-MCNC: 0.11 MG/DL (ref 0–0.2)
BILIRUB SERPL-MCNC: 0.54 MG/DL (ref 0.2–1)
C3 SERPL-MCNC: 124 MG/DL (ref 90–180)
C4 SERPL-MCNC: 36 MG/DL (ref 10–40)
CREAT SERPL-MCNC: 0.65 MG/DL (ref 0.6–1.3)
CRP SERPL QL: <3 MG/L
EOSINOPHIL # BLD AUTO: 0 THOUSAND/ΜL (ref 0–0.61)
EOSINOPHIL NFR BLD AUTO: 0 % (ref 0–6)
ERYTHROCYTE [DISTWIDTH] IN BLOOD BY AUTOMATED COUNT: 15.4 % (ref 11.6–15.1)
ERYTHROCYTE [SEDIMENTATION RATE] IN BLOOD: 13 MM/HOUR (ref 0–20)
GFR SERPL CREATININE-BSD FRML MDRD: 93 ML/MIN/1.73SQ M
HCT VFR BLD AUTO: 35.4 % (ref 34.8–46.1)
HGB BLD-MCNC: 11.2 G/DL (ref 11.5–15.4)
IMM GRANULOCYTES # BLD AUTO: 0.03 THOUSAND/UL (ref 0–0.2)
IMM GRANULOCYTES NFR BLD AUTO: 0 % (ref 0–2)
LYMPHOCYTES # BLD AUTO: 2.19 THOUSANDS/ΜL (ref 0.6–4.47)
LYMPHOCYTES NFR BLD AUTO: 23 % (ref 14–44)
MCH RBC QN AUTO: 28.7 PG (ref 26.8–34.3)
MCHC RBC AUTO-ENTMCNC: 31.6 G/DL (ref 31.4–37.4)
MCV RBC AUTO: 91 FL (ref 82–98)
MONOCYTES # BLD AUTO: 0.6 THOUSAND/ΜL (ref 0.17–1.22)
MONOCYTES NFR BLD AUTO: 6 % (ref 4–12)
NEUTROPHILS # BLD AUTO: 6.83 THOUSANDS/ΜL (ref 1.85–7.62)
NEUTS SEG NFR BLD AUTO: 71 % (ref 43–75)
NRBC BLD AUTO-RTO: 0 /100 WBCS
PLATELET # BLD AUTO: 652 THOUSANDS/UL (ref 149–390)
PMV BLD AUTO: 7.9 FL (ref 8.9–12.7)
PROT SERPL-MCNC: 7.1 G/DL (ref 6.4–8.2)
RBC # BLD AUTO: 3.9 MILLION/UL (ref 3.81–5.12)
WBC # BLD AUTO: 9.67 THOUSAND/UL (ref 4.31–10.16)

## 2018-10-01 PROCEDURE — 84165 PROTEIN E-PHORESIS SERUM: CPT

## 2018-10-01 PROCEDURE — 86038 ANTINUCLEAR ANTIBODIES: CPT

## 2018-10-01 PROCEDURE — 36415 COLL VENOUS BLD VENIPUNCTURE: CPT

## 2018-10-01 PROCEDURE — 86160 COMPLEMENT ANTIGEN: CPT

## 2018-10-01 PROCEDURE — 85025 COMPLETE CBC W/AUTO DIFF WBC: CPT

## 2018-10-01 PROCEDURE — 85652 RBC SED RATE AUTOMATED: CPT

## 2018-10-01 PROCEDURE — 80076 HEPATIC FUNCTION PANEL: CPT

## 2018-10-01 PROCEDURE — 86140 C-REACTIVE PROTEIN: CPT

## 2018-10-01 PROCEDURE — 82565 ASSAY OF CREATININE: CPT

## 2018-10-01 PROCEDURE — 84165 PROTEIN E-PHORESIS SERUM: CPT | Performed by: PATHOLOGY

## 2018-10-01 PROCEDURE — 86255 FLUORESCENT ANTIBODY SCREEN: CPT

## 2018-10-01 PROCEDURE — 86430 RHEUMATOID FACTOR TEST QUAL: CPT

## 2018-10-02 LAB
ALBUMIN SERPL ELPH-MCNC: 3.72 G/DL (ref 3.5–5)
ALBUMIN SERPL ELPH-MCNC: 55.5 % (ref 52–65)
ALPHA1 GLOB SERPL ELPH-MCNC: 0.39 G/DL (ref 0.1–0.4)
ALPHA1 GLOB SERPL ELPH-MCNC: 5.8 % (ref 2.5–5)
ALPHA2 GLOB SERPL ELPH-MCNC: 1.15 G/DL (ref 0.4–1.2)
ALPHA2 GLOB SERPL ELPH-MCNC: 17.1 % (ref 7–13)
BETA GLOB ABNORMAL SERPL ELPH-MCNC: 0.46 G/DL (ref 0.4–0.8)
BETA1 GLOB SERPL ELPH-MCNC: 6.9 % (ref 5–13)
BETA2 GLOB SERPL ELPH-MCNC: 5 % (ref 2–8)
BETA2+GAMMA GLOB SERPL ELPH-MCNC: 0.34 G/DL (ref 0.2–0.5)
GAMMA GLOB ABNORMAL SERPL ELPH-MCNC: 0.65 G/DL (ref 0.5–1.6)
GAMMA GLOB SERPL ELPH-MCNC: 9.7 % (ref 12–22)
IGG/ALB SER: 1.25 {RATIO} (ref 1.1–1.8)
PROT PATTERN SERPL ELPH-IMP: ABNORMAL
PROT SERPL-MCNC: 6.7 G/DL (ref 6.4–8.2)
RHEUMATOID FACT SER QL LA: NEGATIVE

## 2018-10-03 LAB
C-ANCA TITR SER IF: NORMAL TITER
MYELOPEROXIDASE AB SER IA-ACNC: <9 U/ML (ref 0–9)
P-ANCA ATYPICAL TITR SER IF: NORMAL TITER
P-ANCA TITR SER IF: NORMAL TITER
PROTEINASE3 AB SER IA-ACNC: <3.5 U/ML (ref 0–3.5)
RYE IGE QN: NEGATIVE

## 2018-10-16 LAB — HCV AB SER-ACNC: NON REACTIVE

## 2018-10-18 ENCOUNTER — APPOINTMENT (OUTPATIENT)
Dept: LAB | Facility: CLINIC | Age: 68
End: 2018-10-18
Payer: MEDICARE

## 2018-10-18 LAB
BASOPHILS # BLD AUTO: 0.03 THOUSANDS/ΜL (ref 0–0.1)
BASOPHILS NFR BLD AUTO: 0 % (ref 0–1)
EOSINOPHIL # BLD AUTO: 0.14 THOUSAND/ΜL (ref 0–0.61)
EOSINOPHIL NFR BLD AUTO: 2 % (ref 0–6)
ERYTHROCYTE [DISTWIDTH] IN BLOOD BY AUTOMATED COUNT: 15.7 % (ref 11.6–15.1)
HCT VFR BLD AUTO: 33.8 % (ref 34.8–46.1)
HGB BLD-MCNC: 10.8 G/DL (ref 11.5–15.4)
IMM GRANULOCYTES # BLD AUTO: 0.01 THOUSAND/UL (ref 0–0.2)
IMM GRANULOCYTES NFR BLD AUTO: 0 % (ref 0–2)
LYMPHOCYTES # BLD AUTO: 2.21 THOUSANDS/ΜL (ref 0.6–4.47)
LYMPHOCYTES NFR BLD AUTO: 31 % (ref 14–44)
MCH RBC QN AUTO: 29.8 PG (ref 26.8–34.3)
MCHC RBC AUTO-ENTMCNC: 32 G/DL (ref 31.4–37.4)
MCV RBC AUTO: 93 FL (ref 82–98)
MONOCYTES # BLD AUTO: 0.61 THOUSAND/ΜL (ref 0.17–1.22)
MONOCYTES NFR BLD AUTO: 9 % (ref 4–12)
NEUTROPHILS # BLD AUTO: 4.05 THOUSANDS/ΜL (ref 1.85–7.62)
NEUTS SEG NFR BLD AUTO: 58 % (ref 43–75)
NRBC BLD AUTO-RTO: 0 /100 WBCS
PLATELET # BLD AUTO: 526 THOUSANDS/UL (ref 149–390)
PMV BLD AUTO: 8.1 FL (ref 8.9–12.7)
RBC # BLD AUTO: 3.63 MILLION/UL (ref 3.81–5.12)
WBC # BLD AUTO: 7.05 THOUSAND/UL (ref 4.31–10.16)

## 2018-10-18 PROCEDURE — 36415 COLL VENOUS BLD VENIPUNCTURE: CPT | Performed by: PHYSICIAN ASSISTANT

## 2018-10-18 PROCEDURE — 85025 COMPLETE CBC W/AUTO DIFF WBC: CPT | Performed by: PHYSICIAN ASSISTANT

## 2018-10-22 ENCOUNTER — OFFICE VISIT (OUTPATIENT)
Dept: HEMATOLOGY ONCOLOGY | Facility: CLINIC | Age: 68
End: 2018-10-22
Payer: MEDICARE

## 2018-10-22 VITALS
RESPIRATION RATE: 16 BRPM | BODY MASS INDEX: 28.78 KG/M2 | HEART RATE: 67 BPM | SYSTOLIC BLOOD PRESSURE: 130 MMHG | TEMPERATURE: 96.9 F | DIASTOLIC BLOOD PRESSURE: 78 MMHG | OXYGEN SATURATION: 100 % | WEIGHT: 133 LBS

## 2018-10-22 DIAGNOSIS — D64.9 ANEMIA, UNSPECIFIED TYPE: ICD-10-CM

## 2018-10-22 DIAGNOSIS — D75.839 THROMBOCYTOSIS: Primary | ICD-10-CM

## 2018-10-22 PROCEDURE — 99214 OFFICE O/P EST MOD 30 MIN: CPT | Performed by: INTERNAL MEDICINE

## 2018-10-22 RX ORDER — PREDNISONE 1 MG/1
TABLET ORAL
Refills: 0 | COMMUNITY
Start: 2018-09-24 | End: 2022-03-21

## 2018-10-22 NOTE — LETTER
October 22, 2018     GLORIA Pimentel DO  [address]  [address] [address]    Patient: Yuri Dates   YOB: 1950   Date of Visit: 10/22/2018       Dear Dr Hensley Fus: Thank you for referring Jennifer Graham to me for evaluation  Below are my notes for this consultation  If you have questions, please do not hesitate to call me  I look forward to following your patient along with you  Sincerely,        Smith Reyes MD        CC: MD Smith Almanzar MD  10/22/2018 11:41 PM  Sign at close encounter  HPI:  Patient is here with her family member  She has rheumatological disorder and arthritic symptoms and also weakness and tiredness  She ambulates with a walker  Follow-up visit for thrombocytosis and anemia and patient has shown some improvement on tapering dose of prednisone from his rheumatologist   Present prednisone dose is 5 mg and patient states last dose will be tomorrow but she is going to stop at the office of her rheumatologist on this floor to see if he would consider keeping her on low-dose prednisone for now  She gives history of high platelet count at least for the last 6 years when she was seen by Dr Zoë Adan in [place]  She did not have any treatment     Negative Hemoccult stool test             Current Outpatient Prescriptions:     baclofen 10 mg tablet, Take by mouth, Disp: , Rfl:     Calcium Carbonate-Vitamin D 600-200 MG-UNIT TABS, Take by mouth, Disp: , Rfl:     celecoxib (CELEBREX) 200 mg capsule, Take by mouth, Disp: , Rfl:     Cholecalciferol (VITAMIN D PO), Take 5,000 Units by mouth, Disp: , Rfl:     denosumab (PROLIA) 60 mg/mL, Inject under the skin, Disp: , Rfl:     esomeprazole (NEXIUM) 40 MG capsule, Take by mouth, Disp: , Rfl:     levothyroxine 50 mcg tablet, TK 1 T PO QD UTD, Disp: , Rfl: 3    morphine (MS CONTIN) 60 mg 12 hr tablet, TK 1 T PO Q 8 HOURS PO PRN, Disp: , Rfl: 0    Multiple Vitamins-Minerals (CENTRUM SILVER 50+WOMEN) TABS, Take by mouth, Disp: , Rfl:     oxyCODONE-acetaminophen (PERCOCET) 7 5-325 MG per tablet, 1 tablet 3 times a day, Disp: , Rfl: 0    potassium chloride (MICRO-K) 10 MEQ CR capsule, Take by mouth, Disp: , Rfl:     predniSONE 5 mg tablet, , Disp: , Rfl: 0    rosuvastatin (CRESTOR) 5 mg tablet, Take by mouth, Disp: , Rfl:     telmisartan (MICARDIS) 80 MG tablet, Take 40 mg by mouth  , Disp: , Rfl:     zolpidem (AMBIEN) 5 mg tablet, Take by mouth, Disp: , Rfl:     amLODIPine (NORVASC) 5 mg tablet, TK 1 T PO QD, Disp: , Rfl: 0    Allergies   Allergen Reactions    Aspirin     Ibuprofen          ROS:  10/22/18 Reviewed 13 systems:  Presently no headaches, seizures, dizziness, diplopia, dysphagia, hoarseness, chest pain, palpitations, shortness of breath, cough, hemoptysis, abdominal pain, nausea, vomiting, change in bowel habits, melena, hematuria, fever, chills, bleeding, bone pains, skin rash, weight loss,   numbness, claudication   No frequent infections  Not unusually sensitive to heat or cold  No swelling of the ankles  No swollen glands  Patient is anxious  No GYN symptoms at present Other symptoms are in HPI        /78   Pulse 67   Temp (!) 96 9 °F (36 1 °C) (Tympanic)   Resp 16   Wt 60 3 kg (133 lb)   SpO2 100%   BMI 28 78 kg/m²      Physical Exam:  Alert, oriented, not in distress, no icterus, no oral thrush, no palpable neck mass, clear lung fields, regular heart rate, systolic murmur, abdomen  soft and non tender, no palpable abdominal mass, no ascites, no edema of ankles, no calf tenderness, no focal neurological deficit, no skin rash, no palpable lymphadenopathy, good arterial pulses, no clubbing  Patient is anxious  Performance status  3  She ambulates with a walker  IMAGING:  IMPRESSION:     Small right hepatic lobe cyst accounting for recent CT finding    No suspicious hepatic lesions      Moderately severe pancreatic atrophy      Otherwise, unremarkable MRI abdomen               Workstation performed: [id number]      Imaging     MRI abdomen w wo contrast (Order #[id number]) on 9/4/2018 - Imaging Information   IMPRESSION:     1   Limited evaluation of the abdomen and pelvis due to absence of intravenous contrast   2   Normal CT of the chest   3   Indeterminate 7 mm mass in the right lobe of the liver  According to ACR guidelines for incidental liver lesion in a patient at low risk for primary or metastatic liver malignancy, a liver mass of this size can be considered benign and no further   imaging follow-up is recommended    In a high risk patient, follow-up, preferably with MRI, should be obtained in 3-6 months      The study was marked in [facility] for significant notification         Workstation performed: [id number]      Imaging     CT chest abdomen pelvis wo contrast (Order #[id number]) on 8/17/2018 - Imaging Information       LABS:  Results for orders placed or performed in visit on 10/01/18   Sedimentation rate, automated   Result Value Ref Range    Sed Rate 13 0 - 20 mm/hour   C-reactive protein   Result Value Ref Range    CRP <3 0 <3 0 mg/L   GABRIEL Screen w/ Reflex to Titer/Pattern   Result Value Ref Range    GABRIEL Negative Negative   RF Screen w/ Reflex to Titer   Result Value Ref Range    Rheumatoid Factor Negative Negative   C3 complement   Result Value Ref Range    C3 Complement 124 0 90 0 - 180 0 mg/dL   C4 complement   Result Value Ref Range    C4, COMPLEMENT 36 0 10 0 - 40 0 mg/dL   Anti-neutrophilic cytoplasmic antibody   Result Value Ref Range    C-ANCA <1:20 Neg:<1:20 titer    Atypical pANCA <1:20 Neg:<1:20 titer    MPO AB <9 0 0 0 - 9 0 U/mL    IA-3 AB <3 5 0 0 - 3 5 U/mL    P-ANCA <1:20 Neg:<1:20 titer   Protein electrophoresis, serum   Result Value Ref Range    A/G Ratio 1 25 1 10 - 1 80    Albumin Electrophoresis 55 5 52 0 - 65 0 %    Albumin CONC 3 72 3 50 - 5 00 g/dl    Alpha 1 5 8 (H) 2 5 - 5 0 %    ALPHA 1 CONC 0 39 0 10 - 0 40 g/dL    Alpha 2 17 1 (H) 7 0 - 13 0 %    ALPHA 2 CONC 1 15 0 40 - 1 20 g/dL    Beta-1 6 9 5 0 - 13 0 %    BETA 1 CONC 0 46 0 40 - 0 80 g/dL    Beta-2 5 0 2 0 - 8 0 %    BETA 2 CONC 0 34 0 20 - 0 50 g/dL    Gamma Globulin 9 7 (L) 12 0 - 22 0 %    GAMMA CONC 0 65 0 50 - 1 60 g/dL    SPEP Interpretation       The serum total protein, albumin and electrophoresis are within normal limits  No monoclonal bands noted  Reviewed by: Asif Khoury MD ([id number]) **Electronic Signature**    Total Protein 6 7 6 4 - 8 2 g/dL   Hepatic function panel   Result Value Ref Range    Total Bilirubin 0 54 0 20 - 1 00 mg/dL    Bilirubin, Direct 0 11 0 00 - 0 20 mg/dL    Alkaline Phosphatase 74 46 - 116 U/L    AST 12 5 - 45 U/L    ALT 21 12 - 78 U/L    Total Protein 7 1 6 4 - 8 2 g/dL    Albumin 3 3 (L) 3 5 - 5 0 g/dL   CBC and differential   Result Value Ref Range    WBC 9 67 4 31 - 10 16 Thousand/uL    RBC 3 90 3 81 - 5 12 Million/uL    Hemoglobin 11 2 (L) 11 5 - 15 4 g/dL    Hematocrit 35 4 34 8 - 46 1 %    MCV 91 82 - 98 fL    MCH 28 7 26 8 - 34 3 pg    MCHC 31 6 31 4 - 37 4 g/dL    RDW 15 4 (H) 11 6 - 15 1 %    MPV 7 9 (L) 8 9 - 12 7 fL    Platelets 202 (H) 836 - 390 Thousands/uL    nRBC 0 /100 WBCs    Neutrophils Relative 71 43 - 75 %    Immat GRANS % 0 0 - 2 %    Lymphocytes Relative 23 14 - 44 %    Monocytes Relative 6 4 - 12 %    Eosinophils Relative 0 0 - 6 %    Basophils Relative 0 0 - 1 %    Neutrophils Absolute 6 83 1 85 - 7 62 Thousands/µL    Immature Grans Absolute 0 03 0 00 - 0 20 Thousand/uL    Lymphocytes Absolute 2 19 0 60 - 4 47 Thousands/µL    Monocytes Absolute 0 60 0 17 - 1 22 Thousand/µL    Eosinophils Absolute 0 00 0 00 - 0 61 Thousand/µL    Basophils Absolute 0 02 0 00 - 0 10 Thousands/µL   Creatinine, serum   Result Value Ref Range    Creatinine 0 65 0 60 - 1 30 mg/dL    eGFR 93 ml/min/1 73sq m     Labs, Imaging, & Other studies:   All pertinent labs and imaging studies were personally reviewed    Lab Results   Component Value Date  08/28/2018    K 4 9 08/28/2018     08/28/2018    CO2 23 08/28/2018    BUN 19 08/28/2018    CREATININE 0 65 10/01/2018    GLUF 93 08/28/2018    CALCIUM 8 7 08/28/2018    AST 12 10/01/2018    ALT 21 10/01/2018    ALKPHOS 74 10/01/2018    EGFR 93 10/01/2018     Lab Results   Component Value Date    WBC 7 05 10/18/2018    HGB 10 8 (L) 10/18/2018    HCT 33 8 (L) 10/18/2018    MCV 93 10/18/2018     (H) 10/18/2018       Negative JAK2 mutation and negative BCR/ABL  Reviewed and discussed with patient  Assessment and plan:  Patient is here with her family member  She has rheumatological disorder and arthritic symptoms and also weakness and tiredness  She ambulates with a walker  Follow-up visit for thrombocytosis and anemia and patient has shown some improvement on tapering dose of prednisone from his rheumatologist   Present prednisone dose is 5 mg and patient states last dose will be tomorrow but she is going to stop at the office of her rheumatologist on this floor to see if he would consider keeping her on low-dose prednisone for now  She gives history of high platelet count at least for the last 6 years when she was seen by Dr Rambo De Leon in [place]  She did not have any treatment  Negative Hemoccult stool test     Physical examination and test results are as recorded and discussed  Thrombocytosis and anemia appear to be more likely secondary to underlying rheumatological disorder  Additional work up has been requested  See below  Plan is  to do additional workup  Goal is to monitor patient's condition and hematological status  Patient needs assistance with her care  Condition discussed and explained  Questions answered  Discussed the importance of eating healthy foods and staying active  Patient states she is regular with her medical checkups    1  Thrombocytosis (HCC)    - CBC and differential; Future  - Folate; Future  - Iron Panel; Future  - Vitamin B12; Future    2   Anemia, unspecified type    - Folate; Future  - Iron Panel; Future  - Vitamin B12; Future  - TSH, 3rd generation with Free T4 reflex; Future      Patient voiced understanding and agreement in the discussion  Counseling / Coordination of Care   Greater than 50% of total time was spent with the patient and / or family counseling and / or coordination of care

## 2018-10-22 NOTE — PROGRESS NOTES
HPI:  Patient is here with her family member  She has rheumatological disorder and arthritic symptoms and also weakness and tiredness  She ambulates with a walker  Follow-up visit for thrombocytosis and anemia and patient has shown some improvement on tapering dose of prednisone from his rheumatologist   Present prednisone dose is 5 mg and patient states last dose will be tomorrow but she is going to stop at the office of her rheumatologist on this floor to see if he would consider keeping her on low-dose prednisone for now  She gives history of high platelet count at least for the last 6 years when she was seen by Dr Ed Lopez in [place]  She did not have any treatment     Negative Hemoccult stool test             Current Outpatient Prescriptions:     baclofen 10 mg tablet, Take by mouth, Disp: , Rfl:     Calcium Carbonate-Vitamin D 600-200 MG-UNIT TABS, Take by mouth, Disp: , Rfl:     celecoxib (CELEBREX) 200 mg capsule, Take by mouth, Disp: , Rfl:     Cholecalciferol (VITAMIN D PO), Take 5,000 Units by mouth, Disp: , Rfl:     denosumab (PROLIA) 60 mg/mL, Inject under the skin, Disp: , Rfl:     esomeprazole (NEXIUM) 40 MG capsule, Take by mouth, Disp: , Rfl:     levothyroxine 50 mcg tablet, TK 1 T PO QD UTD, Disp: , Rfl: 3    morphine (MS CONTIN) 60 mg 12 hr tablet, TK 1 T PO Q 8 HOURS PO PRN, Disp: , Rfl: 0    Multiple Vitamins-Minerals (CENTRUM SILVER 50+WOMEN) TABS, Take by mouth, Disp: , Rfl:     oxyCODONE-acetaminophen (PERCOCET) 7 5-325 MG per tablet, 1 tablet 3 times a day, Disp: , Rfl: 0    potassium chloride (MICRO-K) 10 MEQ CR capsule, Take by mouth, Disp: , Rfl:     predniSONE 5 mg tablet, , Disp: , Rfl: 0    rosuvastatin (CRESTOR) 5 mg tablet, Take by mouth, Disp: , Rfl:     telmisartan (MICARDIS) 80 MG tablet, Take 40 mg by mouth  , Disp: , Rfl:     zolpidem (AMBIEN) 5 mg tablet, Take by mouth, Disp: , Rfl:     amLODIPine (NORVASC) 5 mg tablet, TK 1 T PO QD, Disp: , Rfl: 0    Allergies Allergen Reactions    Aspirin     Ibuprofen          ROS:  10/22/18 Reviewed 13 systems:  Presently no headaches, seizures, dizziness, diplopia, dysphagia, hoarseness, chest pain, palpitations, shortness of breath, cough, hemoptysis, abdominal pain, nausea, vomiting, change in bowel habits, melena, hematuria, fever, chills, bleeding, bone pains, skin rash, weight loss,   numbness, claudication   No frequent infections  Not unusually sensitive to heat or cold  No swelling of the ankles  No swollen glands  Patient is anxious  No GYN symptoms at present Other symptoms are in HPI        /78   Pulse 67   Temp (!) 96 9 °F (36 1 °C) (Tympanic)   Resp 16   Wt 60 3 kg (133 lb)   SpO2 100%   BMI 28 78 kg/m²     Physical Exam:  Alert, oriented, not in distress, no icterus, no oral thrush, no palpable neck mass, clear lung fields, regular heart rate, systolic murmur, abdomen  soft and non tender, no palpable abdominal mass, no ascites, no edema of ankles, no calf tenderness, no focal neurological deficit, no skin rash, no palpable lymphadenopathy, good arterial pulses, no clubbing  Patient is anxious  Performance status  3  She ambulates with a walker  IMAGING:  IMPRESSION:     Small right hepatic lobe cyst accounting for recent CT finding  No suspicious hepatic lesions      Moderately severe pancreatic atrophy      Otherwise, unremarkable MRI abdomen               Workstation performed: [id number]      Imaging     MRI abdomen w wo contrast (Order #[id number]) on 9/4/2018 - Imaging Information   IMPRESSION:     1   Limited evaluation of the abdomen and pelvis due to absence of intravenous contrast   2   Normal CT of the chest   3   Indeterminate 7 mm mass in the right lobe of the liver    According to ACR guidelines for incidental liver lesion in a patient at low risk for primary or metastatic liver malignancy, a liver mass of this size can be considered benign and no further   imaging follow-up is recommended  In a high risk patient, follow-up, preferably with MRI, should be obtained in 3-6 months      The study was marked in [facility] for significant notification         Workstation performed: [id number]      Imaging     CT chest abdomen pelvis wo contrast (Order #[id number]) on 8/17/2018 - Imaging Information       LABS:  Results for orders placed or performed in visit on 10/01/18   Sedimentation rate, automated   Result Value Ref Range    Sed Rate 13 0 - 20 mm/hour   C-reactive protein   Result Value Ref Range    CRP <3 0 <3 0 mg/L   GABRIEL Screen w/ Reflex to Titer/Pattern   Result Value Ref Range    GABRIEL Negative Negative   RF Screen w/ Reflex to Titer   Result Value Ref Range    Rheumatoid Factor Negative Negative   C3 complement   Result Value Ref Range    C3 Complement 124 0 90 0 - 180 0 mg/dL   C4 complement   Result Value Ref Range    C4, COMPLEMENT 36 0 10 0 - 40 0 mg/dL   Anti-neutrophilic cytoplasmic antibody   Result Value Ref Range    C-ANCA <1:20 Neg:<1:20 titer    Atypical pANCA <1:20 Neg:<1:20 titer    MPO AB <9 0 0 0 - 9 0 U/mL    ME-3 AB <3 5 0 0 - 3 5 U/mL    P-ANCA <1:20 Neg:<1:20 titer   Protein electrophoresis, serum   Result Value Ref Range    A/G Ratio 1 25 1 10 - 1 80    Albumin Electrophoresis 55 5 52 0 - 65 0 %    Albumin CONC 3 72 3 50 - 5 00 g/dl    Alpha 1 5 8 (H) 2 5 - 5 0 %    ALPHA 1 CONC 0 39 0 10 - 0 40 g/dL    Alpha 2 17 1 (H) 7 0 - 13 0 %    ALPHA 2 CONC 1 15 0 40 - 1 20 g/dL    Beta-1 6 9 5 0 - 13 0 %    BETA 1 CONC 0 46 0 40 - 0 80 g/dL    Beta-2 5 0 2 0 - 8 0 %    BETA 2 CONC 0 34 0 20 - 0 50 g/dL    Gamma Globulin 9 7 (L) 12 0 - 22 0 %    GAMMA CONC 0 65 0 50 - 1 60 g/dL    SPEP Interpretation       The serum total protein, albumin and electrophoresis are within normal limits  No monoclonal bands noted  Reviewed by: Kailey Araujo MD ([id number]) **Electronic Signature**    Total Protein 6 7 6 4 - 8 2 g/dL   Hepatic function panel   Result Value Ref Range    Total Bilirubin 0  54 0 20 - 1 00 mg/dL    Bilirubin, Direct 0 11 0 00 - 0 20 mg/dL    Alkaline Phosphatase 74 46 - 116 U/L    AST 12 5 - 45 U/L    ALT 21 12 - 78 U/L    Total Protein 7 1 6 4 - 8 2 g/dL    Albumin 3 3 (L) 3 5 - 5 0 g/dL   CBC and differential   Result Value Ref Range    WBC 9 67 4 31 - 10 16 Thousand/uL    RBC 3 90 3 81 - 5 12 Million/uL    Hemoglobin 11 2 (L) 11 5 - 15 4 g/dL    Hematocrit 35 4 34 8 - 46 1 %    MCV 91 82 - 98 fL    MCH 28 7 26 8 - 34 3 pg    MCHC 31 6 31 4 - 37 4 g/dL    RDW 15 4 (H) 11 6 - 15 1 %    MPV 7 9 (L) 8 9 - 12 7 fL    Platelets 357 (H) 934 - 390 Thousands/uL    nRBC 0 /100 WBCs    Neutrophils Relative 71 43 - 75 %    Immat GRANS % 0 0 - 2 %    Lymphocytes Relative 23 14 - 44 %    Monocytes Relative 6 4 - 12 %    Eosinophils Relative 0 0 - 6 %    Basophils Relative 0 0 - 1 %    Neutrophils Absolute 6 83 1 85 - 7 62 Thousands/µL    Immature Grans Absolute 0 03 0 00 - 0 20 Thousand/uL    Lymphocytes Absolute 2 19 0 60 - 4 47 Thousands/µL    Monocytes Absolute 0 60 0 17 - 1 22 Thousand/µL    Eosinophils Absolute 0 00 0 00 - 0 61 Thousand/µL    Basophils Absolute 0 02 0 00 - 0 10 Thousands/µL   Creatinine, serum   Result Value Ref Range    Creatinine 0 65 0 60 - 1 30 mg/dL    eGFR 93 ml/min/1 73sq m     Labs, Imaging, & Other studies:   All pertinent labs and imaging studies were personally reviewed    Lab Results   Component Value Date     08/28/2018    K 4 9 08/28/2018     08/28/2018    CO2 23 08/28/2018    BUN 19 08/28/2018    CREATININE 0 65 10/01/2018    GLUF 93 08/28/2018    CALCIUM 8 7 08/28/2018    AST 12 10/01/2018    ALT 21 10/01/2018    ALKPHOS 74 10/01/2018    EGFR 93 10/01/2018     Lab Results   Component Value Date    WBC 7 05 10/18/2018    HGB 10 8 (L) 10/18/2018    HCT 33 8 (L) 10/18/2018    MCV 93 10/18/2018     (H) 10/18/2018       Negative JAK2 mutation and negative BCR/ABL  Reviewed and discussed with patient      Assessment and plan:  Patient is here with her family member  She has rheumatological disorder and arthritic symptoms and also weakness and tiredness  She ambulates with a walker  Follow-up visit for thrombocytosis and anemia and patient has shown some improvement on tapering dose of prednisone from his rheumatologist   Present prednisone dose is 5 mg and patient states last dose will be tomorrow but she is going to stop at the office of her rheumatologist on this floor to see if he would consider keeping her on low-dose prednisone for now  She gives history of high platelet count at least for the last 6 years when she was seen by Dr Christophe Marks in [place]  She did not have any treatment  Negative Hemoccult stool test     Physical examination and test results are as recorded and discussed  Thrombocytosis and anemia appear to be more likely secondary to underlying rheumatological disorder  Additional work up has been requested  See below  Plan is  to do additional workup  Goal is to monitor patient's condition and hematological status  Patient needs assistance with her care  Condition discussed and explained  Questions answered  Discussed the importance of eating healthy foods and staying active  Patient states she is regular with her medical checkups    1  Thrombocytosis (HCC)    - CBC and differential; Future  - Folate; Future  - Iron Panel; Future  - Vitamin B12; Future    2  Anemia, unspecified type    - Folate; Future  - Iron Panel; Future  - Vitamin B12; Future  - TSH, 3rd generation with Free T4 reflex; Future      Patient voiced understanding and agreement in the discussion  Counseling / Coordination of Care   Greater than 50% of total time was spent with the patient and / or family counseling and / or coordination of care

## 2019-01-04 ENCOUNTER — TRANSCRIBE ORDERS (OUTPATIENT)
Dept: LAB | Facility: CLINIC | Age: 69
End: 2019-01-04

## 2019-01-04 ENCOUNTER — APPOINTMENT (OUTPATIENT)
Dept: LAB | Facility: CLINIC | Age: 69
End: 2019-01-04
Payer: MEDICARE

## 2019-01-04 DIAGNOSIS — D64.9 ANEMIA, UNSPECIFIED TYPE: ICD-10-CM

## 2019-01-04 DIAGNOSIS — R70.0 ELEVATED SEDIMENTATION RATE: Primary | ICD-10-CM

## 2019-01-04 DIAGNOSIS — R70.0 ELEVATED SEDIMENTATION RATE: ICD-10-CM

## 2019-01-04 DIAGNOSIS — D75.839 THROMBOCYTOSIS: ICD-10-CM

## 2019-01-04 LAB
CRP SERPL QL: <3 MG/L
ERYTHROCYTE [SEDIMENTATION RATE] IN BLOOD: 10 MM/HOUR (ref 0–20)
FERRITIN SERPL-MCNC: 12 NG/ML (ref 8–388)
FOLATE SERPL-MCNC: >20 NG/ML (ref 3.1–17.5)
IRON SATN MFR SERPL: 16 %
IRON SERPL-MCNC: 62 UG/DL (ref 50–170)
TIBC SERPL-MCNC: 400 UG/DL (ref 250–450)
TSH SERPL DL<=0.05 MIU/L-ACNC: 2.98 UIU/ML (ref 0.36–3.74)
VIT B12 SERPL-MCNC: 544 PG/ML (ref 100–900)

## 2019-01-04 PROCEDURE — 83540 ASSAY OF IRON: CPT

## 2019-01-04 PROCEDURE — 84443 ASSAY THYROID STIM HORMONE: CPT

## 2019-01-04 PROCEDURE — 85652 RBC SED RATE AUTOMATED: CPT

## 2019-01-04 PROCEDURE — 86140 C-REACTIVE PROTEIN: CPT

## 2019-01-04 PROCEDURE — 82607 VITAMIN B-12: CPT

## 2019-01-04 PROCEDURE — 82746 ASSAY OF FOLIC ACID SERUM: CPT

## 2019-01-04 PROCEDURE — 36415 COLL VENOUS BLD VENIPUNCTURE: CPT

## 2019-01-04 PROCEDURE — 82728 ASSAY OF FERRITIN: CPT

## 2019-01-04 PROCEDURE — 83550 IRON BINDING TEST: CPT

## 2019-01-08 ENCOUNTER — OFFICE VISIT (OUTPATIENT)
Dept: HEMATOLOGY ONCOLOGY | Facility: CLINIC | Age: 69
End: 2019-01-08
Payer: MEDICARE

## 2019-01-08 VITALS
OXYGEN SATURATION: 96 % | HEART RATE: 88 BPM | DIASTOLIC BLOOD PRESSURE: 70 MMHG | SYSTOLIC BLOOD PRESSURE: 122 MMHG | TEMPERATURE: 97.6 F | RESPIRATION RATE: 18 BRPM | BODY MASS INDEX: 29.65 KG/M2 | WEIGHT: 137 LBS

## 2019-01-08 DIAGNOSIS — D50.9 IRON DEFICIENCY ANEMIA, UNSPECIFIED IRON DEFICIENCY ANEMIA TYPE: ICD-10-CM

## 2019-01-08 DIAGNOSIS — D75.839 THROMBOCYTOSIS: Primary | ICD-10-CM

## 2019-01-08 DIAGNOSIS — D64.9 ANEMIA, UNSPECIFIED TYPE: ICD-10-CM

## 2019-01-08 PROCEDURE — 99214 OFFICE O/P EST MOD 30 MIN: CPT | Performed by: INTERNAL MEDICINE

## 2019-01-08 NOTE — LETTER
January 8, 2019     GLORIA Bush Tomyrosalba   [address]  [address] [address]    Patient: Chauncey Robison   YOB: 1950   Date of Visit: 1/8/2019       Dear Dr Vicky Gutierrez: Thank you for referring Mana Kay to me for evaluation  Below are my notes for this consultation  If you have questions, please do not hesitate to call me  I look forward to following your patient along with you  Sincerely,        Nj Delgado MD        CC: No Recipients  Nj Delgado MD  1/8/2019 10:28 PM  Sign at close encounter    HPI:   Follow-up visit for thrombocytosis and that could be secondary  Negative JAK2 mutation She has underlying rheumatological disorder and has arthritic symptoms, weakness and tiredness  She has a wheelchair  She can ambulate with a walker  She was also anemic but that has improved  Ferritin level is low normal  She states she had EGD and colonoscopy 5 years ago and I advised her to have that again  She states she will be making arrangements for that with her Dr  patient is on prednisone 5 mg daily for rheumatological disorder  She states she cannot take aspirin  She is on Celebrex  She gives history of high platelet count at least for the last 6 years when [facility]     Negative Hemoccult stool test               Current Outpatient Prescriptions:     baclofen 10 mg tablet, Take by mouth, Disp: , Rfl:     Calcium Carbonate-Vitamin D 600-200 MG-UNIT TABS, Take by mouth, Disp: , Rfl:     celecoxib (CELEBREX) 200 mg capsule, Take by mouth, Disp: , Rfl:     Cholecalciferol (VITAMIN D PO), Take 5,000 Units by mouth, Disp: , Rfl:     denosumab (PROLIA) 60 mg/mL, Inject under the skin, Disp: , Rfl:     esomeprazole (NEXIUM) 40 MG capsule, Take by mouth, Disp: , Rfl:     levothyroxine 50 mcg tablet, TK 1 T PO QD UTD, Disp: , Rfl: 3    morphine (MS CONTIN) 60 mg 12 hr tablet, TK 1 T PO Q 8 HOURS PO PRN, Disp: , Rfl: 0    Multiple Vitamins-Minerals (CENTRUM SILVER 50+WOMEN) TABS, Take by mouth, Disp: , Rfl:     potassium chloride (MICRO-K) 10 MEQ CR capsule, Take by mouth, Disp: , Rfl:     predniSONE 5 mg tablet, , Disp: , Rfl: 0    rosuvastatin (CRESTOR) 5 mg tablet, Take by mouth, Disp: , Rfl:     telmisartan (MICARDIS) 80 MG tablet, Take 40 mg by mouth  , Disp: , Rfl:     zolpidem (AMBIEN) 5 mg tablet, Take by mouth, Disp: , Rfl:     amLODIPine (NORVASC) 5 mg tablet, TK 1 T PO QD, Disp: , Rfl: 0    oxyCODONE-acetaminophen (PERCOCET) 7 5-325 MG per tablet, 1 tablet 3 times a day, Disp: , Rfl: 0    Allergies   Allergen Reactions    Aspirin     Ibuprofen         No history exists  ROS:  01/08/19 Reviewed 13 systems:  Presently no other neurological, cardiac, pulmonary, GI and  symptoms other than listed above  No other symptoms like fever, chills, bleeding, bone pains, skin rash, weight loss,   numbness,  claudication   No frequent infections  Not unusually sensitive to heat or cold  No swelling of the ankles  No swollen glands  Patient is anxious  Other symptoms are in HPI        /70 (BP Location: Left arm, Patient Position: Sitting, Cuff Size: Adult)   Pulse 88   Temp 97 6 °F (36 4 °C)   Resp 18   Wt 62 1 kg (137 lb)   SpO2 96%   BMI 29 65 kg/m²      Physical Exam:  Patient is alert and oriented  She is not in distress  Vital signs are stable  There is no scleral icterus  No oral thrush  There is no palpable neck mass  Lung fields are clear to percussion and auscultation  Heart rate is regular  Abdomen is soft and nontender  No edema of the ankles  No calf tenderness  No peripheral palpable lymphadenopathy in the neck and axillary areas  She can move all 4 extremities but range of movements are limited  No skin rash  Good arterial pulses, no clubbing  Patient is anxious  Performance status 3      IMAGING:      LABS:  Results for orders placed or performed in visit on 01/04/19   Folate   Result Value Ref Range    Folate >20 0 (H) 3 1 - 17 5 ng/mL   Vitamin B12   Result Value Ref Range    Vitamin B-12 544 100 - 900 pg/mL   TSH, 3rd generation with Free T4 reflex   Result Value Ref Range    TSH 3RD GENERATON 2 980 0 358 - 3 740 uIU/mL   Sedimentation rate, automated   Result Value Ref Range    Sed Rate 10 0 - 20 mm/hour   C-reactive protein   Result Value Ref Range    CRP <3 0 <3 0 mg/L   Iron Saturation %   Result Value Ref Range    Iron Saturation 16 %    TIBC 400 250 - 450 ug/dL    Iron 62 50 - 170 ug/dL   Ferritin   Result Value Ref Range    Ferritin 12 8 - 388 ng/mL     Labs, Imaging, & Other studies:   All pertinent labs and imaging studies were personally reviewed    Lab Results   Component Value Date    K 4 9 08/28/2018     08/28/2018    CO2 23 08/28/2018    BUN 19 08/28/2018    CREATININE 0 65 10/01/2018    GLUF 93 08/28/2018    CALCIUM 8 7 08/28/2018    AST 12 10/01/2018    ALT 21 10/01/2018    ALKPHOS 74 10/01/2018    EGFR 93 10/01/2018     Lab Results   Component Value Date    WBC 10 03 01/04/2019    HGB 12 7 01/04/2019    HCT 40 8 01/04/2019    MCV 92 01/04/2019     (H) 01/04/2019       Reviewed and discussed with patient  Assessment and plan: Follow-up visit for thrombocytosis and that could be secondary  Negative JAK2 mutation She has underlying rheumatological disorder and has arthritic symptoms, weakness and tiredness  She has a wheelchair  She can ambulate with a walker  She was also anemic but that has improved  Ferritin level is low normal  She states she had EGD and colonoscopy 5 years ago and I advised her to have that again  She states she will be making arrangements for that with her Dr  patient is on prednisone 5 mg daily for rheumatological disorder  She states she cannot take aspirin  She is on Celebrex    She gives history of high platelet count at least for the last 6 years when she was seen by Dr Jacqueline Wells in [facility] did not have any treatment  Negative Hemoccult stool test     Physical examination and test results are as recorded and discussed  Blood counts are being monitored  Stool will be checked for blood  She will make arrangements for GI evaluation  See below  All discussed in detail  Questions answered  Discussed the importance of self-breast examination, eating healthy foods, staying active and health screening test   Patient needs assistance in her care  1  Thrombocytosis (HCC)    - CBC and differential; Standing  - CBC and differential    2  Anemia, unspecified type    - Occult Blood, Fecal Immunochemical  - CBC and differential; Standing  - CBC and differential    3  Iron deficiency anemia, unspecified iron deficiency anemia type    - Occult Blood, Fecal Immunochemical  - CBC and differential; Standing  - CBC and differential      Patient voiced understanding and agreement in the discussion  Counseling / Coordination of Care   Greater than 50% of total time was spent with the patient and / or family counseling and / or coordination of care

## 2019-01-08 NOTE — PROGRESS NOTES
HPI:   Follow-up visit for thrombocytosis and that could be secondary  Negative JAK2 mutation She has underlying rheumatological disorder and has arthritic symptoms, weakness and tiredness  She has a wheelchair  She can ambulate with a walker  She was also anemic but that has improved  Ferritin level is low normal  She states she had EGD and colonoscopy 5 years ago and I advised her to have that again  She states she will be making arrangements for that with her Dr  patient is on prednisone 5 mg daily for rheumatological disorder  She states she cannot take aspirin  She is on Celebrex  She gives history of high platelet count at least for the last 6 years when she was seen by Dr Lita Cancino in [facility] did not have any treatment     Negative Hemoccult stool test               Current Outpatient Prescriptions:     baclofen 10 mg tablet, Take by mouth, Disp: , Rfl:     Calcium Carbonate-Vitamin D 600-200 MG-UNIT TABS, Take by mouth, Disp: , Rfl:     celecoxib (CELEBREX) 200 mg capsule, Take by mouth, Disp: , Rfl:     Cholecalciferol (VITAMIN D PO), Take 5,000 Units by mouth, Disp: , Rfl:     denosumab (PROLIA) 60 mg/mL, Inject under the skin, Disp: , Rfl:     esomeprazole (NEXIUM) 40 MG capsule, Take by mouth, Disp: , Rfl:     levothyroxine 50 mcg tablet, TK 1 T PO QD UTD, Disp: , Rfl: 3    morphine (MS CONTIN) 60 mg 12 hr tablet, TK 1 T PO Q 8 HOURS PO PRN, Disp: , Rfl: 0    Multiple Vitamins-Minerals (CENTRUM SILVER 50+WOMEN) TABS, Take by mouth, Disp: , Rfl:     potassium chloride (MICRO-K) 10 MEQ CR capsule, Take by mouth, Disp: , Rfl:     predniSONE 5 mg tablet, , Disp: , Rfl: 0    rosuvastatin (CRESTOR) 5 mg tablet, Take by mouth, Disp: , Rfl:     telmisartan (MICARDIS) 80 MG tablet, Take 40 mg by mouth  , Disp: , Rfl:     zolpidem (AMBIEN) 5 mg tablet, Take by mouth, Disp: , Rfl:     amLODIPine (NORVASC) 5 mg tablet, TK 1 T PO QD, Disp: , Rfl: 0    oxyCODONE-acetaminophen (PERCOCET) 7 5-325 MG per tablet, 1 tablet 3 times a day, Disp: , Rfl: 0    Allergies   Allergen Reactions    Aspirin     Ibuprofen         No history exists  ROS:  01/08/19 Reviewed 13 systems:  Presently no other neurological, cardiac, pulmonary, GI and  symptoms other than listed above  No other symptoms like fever, chills, bleeding, bone pains, skin rash, weight loss,   numbness,  claudication   No frequent infections  Not unusually sensitive to heat or cold  No swelling of the ankles  No swollen glands  Patient is anxious  Other symptoms are in HPI        /70 (BP Location: Left arm, Patient Position: Sitting, Cuff Size: Adult)   Pulse 88   Temp 97 6 °F (36 4 °C)   Resp 18   Wt 62 1 kg (137 lb)   SpO2 96%   BMI 29 65 kg/m²     Physical Exam:  Patient is alert and oriented  She is not in distress  Vital signs are stable  There is no scleral icterus  No oral thrush  There is no palpable neck mass  Lung fields are clear to percussion and auscultation  Heart rate is regular  Abdomen is soft and nontender  No edema of the ankles  No calf tenderness  No peripheral palpable lymphadenopathy in the neck and axillary areas  She can move all 4 extremities but range of movements are limited  No skin rash  Good arterial pulses, no clubbing  Patient is anxious  Performance status 3      IMAGING:      LABS:  Results for orders placed or performed in visit on 01/04/19   Folate   Result Value Ref Range    Folate >20 0 (H) 3 1 - 17 5 ng/mL   Vitamin B12   Result Value Ref Range    Vitamin B-12 544 100 - 900 pg/mL   TSH, 3rd generation with Free T4 reflex   Result Value Ref Range    TSH 3RD GENERATON 2 980 0 358 - 3 740 uIU/mL   Sedimentation rate, automated   Result Value Ref Range    Sed Rate 10 0 - 20 mm/hour   C-reactive protein   Result Value Ref Range    CRP <3 0 <3 0 mg/L   Iron Saturation %   Result Value Ref Range    Iron Saturation 16 %    TIBC 400 250 - 450 ug/dL    Iron 62 50 - 170 ug/dL Ferritin   Result Value Ref Range    Ferritin 12 8 - 388 ng/mL     Labs, Imaging, & Other studies:   All pertinent labs and imaging studies were personally reviewed    Lab Results   Component Value Date    K 4 9 08/28/2018     08/28/2018    CO2 23 08/28/2018    BUN 19 08/28/2018    CREATININE 0 65 10/01/2018    GLUF 93 08/28/2018    CALCIUM 8 7 08/28/2018    AST 12 10/01/2018    ALT 21 10/01/2018    ALKPHOS 74 10/01/2018    EGFR 93 10/01/2018     Lab Results   Component Value Date    WBC 10 03 01/04/2019    HGB 12 7 01/04/2019    HCT 40 8 01/04/2019    MCV 92 01/04/2019     (H) 01/04/2019       Reviewed and discussed with patient  Assessment and plan: Follow-up visit for thrombocytosis and that could be secondary  Negative JAK2 mutation She has underlying rheumatological disorder and has arthritic symptoms, weakness and tiredness  She has a wheelchair  She can ambulate with a walker  She was also anemic but that has improved  Ferritin level is low normal  She states she had EGD and colonoscopy 5 years ago and I advised her to have that again  She states she will be making arrangements for that with her Dr  patient is on prednisone 5 mg daily for rheumatological disorder  She states she cannot take aspirin  She is on Celebrex  She gives history of high platelet count at least for the last 6 years [facility]  Negative Hemoccult stool test     Physical examination and test results are as recorded and discussed  Blood counts are being monitored  Stool will be checked for blood  She will make arrangements for GI evaluation  See below  All discussed in detail  Questions answered  Discussed the importance of self-breast examination, eating healthy foods, staying active and health screening test   Patient needs assistance in her care      1  Thrombocytosis (HCC)    - CBC and differential; Standing  - CBC and differential    2  Anemia, unspecified type    - Occult Blood, Fecal Immunochemical  - CBC and differential; Standing  - CBC and differential    3  Iron deficiency anemia, unspecified iron deficiency anemia type    - Occult Blood, Fecal Immunochemical  - CBC and differential; Standing  - CBC and differential      Patient voiced understanding and agreement in the discussion  Counseling / Coordination of Care   Greater than 50% of total time was spent with the patient and / or family counseling and / or coordination of care

## 2019-03-06 ENCOUNTER — APPOINTMENT (OUTPATIENT)
Dept: LAB | Facility: CLINIC | Age: 69
End: 2019-03-06
Payer: MEDICARE

## 2019-03-06 LAB
BASOPHILS # BLD AUTO: 0.03 THOUSANDS/ΜL (ref 0–0.1)
BASOPHILS NFR BLD AUTO: 1 % (ref 0–1)
EOSINOPHIL # BLD AUTO: 0.06 THOUSAND/ΜL (ref 0–0.61)
EOSINOPHIL NFR BLD AUTO: 1 % (ref 0–6)
ERYTHROCYTE [DISTWIDTH] IN BLOOD BY AUTOMATED COUNT: 14.1 % (ref 11.6–15.1)
HCT VFR BLD AUTO: 38.6 % (ref 34.8–46.1)
HGB BLD-MCNC: 12.3 G/DL (ref 11.5–15.4)
IMM GRANULOCYTES # BLD AUTO: 0.02 THOUSAND/UL (ref 0–0.2)
IMM GRANULOCYTES NFR BLD AUTO: 0 % (ref 0–2)
LYMPHOCYTES # BLD AUTO: 1.95 THOUSANDS/ΜL (ref 0.6–4.47)
LYMPHOCYTES NFR BLD AUTO: 32 % (ref 14–44)
MCH RBC QN AUTO: 29.3 PG (ref 26.8–34.3)
MCHC RBC AUTO-ENTMCNC: 31.9 G/DL (ref 31.4–37.4)
MCV RBC AUTO: 92 FL (ref 82–98)
MONOCYTES # BLD AUTO: 0.47 THOUSAND/ΜL (ref 0.17–1.22)
MONOCYTES NFR BLD AUTO: 8 % (ref 4–12)
NEUTROPHILS # BLD AUTO: 3.62 THOUSANDS/ΜL (ref 1.85–7.62)
NEUTS SEG NFR BLD AUTO: 58 % (ref 43–75)
NRBC BLD AUTO-RTO: 0 /100 WBCS
PLATELET # BLD AUTO: 515 THOUSANDS/UL (ref 149–390)
PMV BLD AUTO: 8.9 FL (ref 8.9–12.7)
RBC # BLD AUTO: 4.2 MILLION/UL (ref 3.81–5.12)
WBC # BLD AUTO: 6.15 THOUSAND/UL (ref 4.31–10.16)

## 2019-03-06 PROCEDURE — 85025 COMPLETE CBC W/AUTO DIFF WBC: CPT | Performed by: INTERNAL MEDICINE

## 2019-03-06 PROCEDURE — 36415 COLL VENOUS BLD VENIPUNCTURE: CPT | Performed by: INTERNAL MEDICINE

## 2019-05-07 ENCOUNTER — APPOINTMENT (OUTPATIENT)
Dept: LAB | Facility: CLINIC | Age: 69
End: 2019-05-07
Payer: MEDICARE

## 2019-05-23 ENCOUNTER — TELEPHONE (OUTPATIENT)
Dept: HEMATOLOGY ONCOLOGY | Facility: CLINIC | Age: 69
End: 2019-05-23

## 2019-05-24 ENCOUNTER — TELEPHONE (OUTPATIENT)
Dept: HEMATOLOGY ONCOLOGY | Facility: CLINIC | Age: 69
End: 2019-05-24

## 2019-07-05 ENCOUNTER — APPOINTMENT (OUTPATIENT)
Dept: LAB | Facility: CLINIC | Age: 69
End: 2019-07-05
Payer: MEDICARE

## 2019-07-16 ENCOUNTER — OFFICE VISIT (OUTPATIENT)
Dept: HEMATOLOGY ONCOLOGY | Facility: CLINIC | Age: 69
End: 2019-07-16
Payer: MEDICARE

## 2019-07-16 VITALS
TEMPERATURE: 98.7 F | HEIGHT: 60 IN | HEART RATE: 77 BPM | OXYGEN SATURATION: 99 % | DIASTOLIC BLOOD PRESSURE: 74 MMHG | BODY MASS INDEX: 28.37 KG/M2 | RESPIRATION RATE: 16 BRPM | SYSTOLIC BLOOD PRESSURE: 125 MMHG | WEIGHT: 144.5 LBS

## 2019-07-16 DIAGNOSIS — D50.9 IRON DEFICIENCY ANEMIA, UNSPECIFIED IRON DEFICIENCY ANEMIA TYPE: Primary | ICD-10-CM

## 2019-07-16 DIAGNOSIS — D75.839 THROMBOCYTOSIS: ICD-10-CM

## 2019-07-16 PROBLEM — D47.1 MYELOPROLIFERATIVE DISORDER (HCC): Status: RESOLVED | Noted: 2018-08-07 | Resolved: 2019-07-16

## 2019-07-16 PROCEDURE — 99214 OFFICE O/P EST MOD 30 MIN: CPT | Performed by: PHYSICIAN ASSISTANT

## 2019-07-16 NOTE — LETTER
July 16, 2019     Jay Her MD  [id number]  [address]  [facility] [address]    Patient: Edmundo Singleton   YOB: 1950   Date of Visit: 7/16/2019       Dear Dr Morgan Mail: Thank you for referring Janith Lesch to me for evaluation  Below are my notes for this consultation  If you have questions, please do not hesitate to call me  I look forward to following your patient along with you  Sincerely,        Elena Bunch PA-C        CC: No Recipients  Elena Bunch PA-C  7/16/2019  4:57 PM  Sign at close encounter  Hematology/Oncology Outpatient Follow-up  Read Mani 81 y o  female 1950 [id number]    Date:  7/16/2019      Assessment and Plan:  1  Iron deficiency anemia, unspecified iron deficiency anemia type  63-year-old female presents for follow-up  She was noted to have decreased ferritin as well as decreased hemoglobin  She was started on oral iron tolerating well  Hemoglobin has improved  She had an EGD and colonoscopy as below  These were unrevealing  She does not have any occult bleeding symptoms  Continue oral iron once daily  Repeat labs in 6 months     - CBC and differential  - Iron Panel; Future    2  Thrombocytosis ([address] )  History of thrombocytosis for many years with a negative workup  She does have underlying inflammatory condition  Iron deficiency could have been a contributing as well  Platelet count present has improved slightly  Continue monitoring     - CBC and differential      HPI:  63-year-old female with history of thrombocytosis and iron deficiency  Patient has had thrombocytosis for approximately 7 years  She previously was followed with [facility]   Patient had workup which was negative for JAK2 mutation, negative bcr/ABL  Reticulocyte count was normal   Iron levels in September 2018 showed chronic disease  Occult stool testing was negative    Sed rate and CRP were elevated this is likely due to her underlying rheumatological condition for which she follows with rheumatology and has been on prednisone 5 mg since August 2018  She has follow-up with rheumatology in the next 1 month  Repeat labs in January 2019 showed a ferritin of 12, previously 182 in August 2018  Hemoglobin in May 2019 decreased 11 3  Patient was advised on 05/24/2019 to start taking 1 iron tablet daily  She stated that she really had an EGD and colonoscopy with Dr Bonnie Arnold on 05/14/2019 and had polyps found in the stomach and colon which were all removed  She states today that she does not have further follow-up with him  ROS: Review of Systems   Constitutional: Negative for appetite change, chills, fever and unexpected weight change  Respiratory: Negative for cough and shortness of breath  Cardiovascular: Negative for chest pain, palpitations and leg swelling  Gastrointestinal: Negative for abdominal pain, blood in stool, constipation, diarrhea, nausea and vomiting  Darker stools since starting oral iron however none prior   Genitourinary: Negative for difficulty urinating, dysuria and hematuria  Musculoskeletal: Positive for arthralgias  Skin: Negative  Neurological: Negative for dizziness, weakness, light-headedness, numbness and headaches  Hematological: Negative  Psychiatric/Behavioral: Negative          Past Medical History:   Diagnosis Date    Arthritis     Hernia, umbilical     Hypertension     Spinal stenosis        Past Surgical History:   Procedure Laterality Date    FOOT SURGERY      HYSTERECTOMY         Social History     Socioeconomic History    Marital status: /Civil Union     Spouse name: Not on file    Number of children: Not on file    Years of education: Not on file    Highest education level: Not on file   Occupational History    Not on file   Social Needs    Financial resource strain: Not on file    Food insecurity:     Worry: Not on file     Inability: Not on file  Transportation needs:     Medical: Not on file     Non-medical: Not on file   Tobacco Use    Smoking status: Never Smoker    Smokeless tobacco: Never Used   Substance and Sexual Activity    Alcohol use: No    Drug use: No    Sexual activity: Not on file   Lifestyle    Physical activity:     Days per week: Not on file     Minutes per session: Not on file    Stress: Not on file   Relationships    Social connections:     Talks on phone: Not on file     Gets together: Not on file     Attends Scientologist service: Not on file     Active member of club or organization: Not on file     Attends meetings of clubs or organizations: Not on file     Relationship status: Not on file    Intimate partner violence:     Fear of current or ex partner: Not on file     Emotionally abused: Not on file     Physically abused: Not on file     Forced sexual activity: Not on file   Other Topics Concern    Not on file   Social History Narrative    Not on file       Family History   Problem Relation Age of Onset    Hypertension Mother     Cancer Father        Allergies   Allergen Reactions    Aspirin     Ibuprofen          Current Outpatient Medications:     amLODIPine (NORVASC) 5 mg tablet, TK 1 T PO QD, Disp: , Rfl: 0    baclofen 10 mg tablet, Take by mouth, Disp: , Rfl:     Calcium Carbonate-Vitamin D 600-200 MG-UNIT TABS, Take by mouth, Disp: , Rfl:     celecoxib (CELEBREX) 200 mg capsule, Take by mouth, Disp: , Rfl:     Cholecalciferol (VITAMIN D PO), Take 5,000 Units by mouth, Disp: , Rfl:     denosumab (PROLIA) 60 mg/mL, Inject under the skin, Disp: , Rfl:     esomeprazole (NEXIUM) 40 MG capsule, Take by mouth, Disp: , Rfl:     levothyroxine 50 mcg tablet, TK 1 T PO QD UTD, Disp: , Rfl: 3    morphine (MS CONTIN) 60 mg 12 hr tablet, TK 1 T PO Q 8 HOURS PO PRN, Disp: , Rfl: 0    Multiple Vitamins-Minerals (CENTRUM SILVER 50+WOMEN) TABS, Take by mouth, Disp: , Rfl:     oxyCODONE-acetaminophen (PERCOCET) 7 5-325 MG per tablet, 1 tablet 3 times a day, Disp: , Rfl: 0    potassium chloride (MICRO-K) 10 MEQ CR capsule, Take by mouth, Disp: , Rfl:     predniSONE 5 mg tablet, , Disp: , Rfl: 0    rosuvastatin (CRESTOR) 5 mg tablet, Take by mouth, Disp: , Rfl:     telmisartan (MICARDIS) 80 MG tablet, Take 40 mg by mouth  , Disp: , Rfl:     zolpidem (AMBIEN) 5 mg tablet, Take by mouth, Disp: , Rfl:       Physical Exam:  /74 (BP Location: Left arm, Patient Position: Sitting, Cuff Size: Adult)   Pulse 77   Temp 98 7 °F (37 1 °C) (Oral)   Resp 16   Ht 5' (1 524 m)   Wt 65 5 kg (144 lb 8 oz)   SpO2 99%   BMI 28 22 kg/m²      Physical Exam   Constitutional: She is oriented to person, place, and time  She appears well-developed and well-nourished  No distress  HENT:   Head: Normocephalic and atraumatic  Eyes: Conjunctivae are normal  No scleral icterus  Neck: Normal range of motion  Neck supple  Cardiovascular: Normal rate, regular rhythm and normal heart sounds  No murmur heard  Pulmonary/Chest: Effort normal and breath sounds normal  No respiratory distress  Abdominal: Soft  There is no tenderness  Musculoskeletal: She exhibits no edema  Ambulates with walker  Brace present on right lower extremity   Lymphadenopathy:     She has no cervical adenopathy  Neurological: She is alert and oriented to person, place, and time  No cranial nerve deficit  Skin: Skin is warm and dry  There is pallor  Psychiatric: She has a normal mood and affect  Labs:  Lab Results   Component Value Date    WBC 6 81 07/05/2019    HGB 12 3 07/05/2019    HCT 39 5 07/05/2019    MCV 94 07/05/2019     (H) 07/05/2019         Patient voiced understanding and agreement in the above discussion  Aware to contact our office with questions/symptoms in the interim

## 2019-07-16 NOTE — PROGRESS NOTES
Hematology/Oncology Outpatient Follow-up  Shilo Pérez 81 y o  female 1950 [id number]    Date:  7/16/2019      Assessment and Plan:  1  Iron deficiency anemia, unspecified iron deficiency anemia type  74-year-old female presents for follow-up  She was noted to have decreased ferritin as well as decreased hemoglobin  She was started on oral iron tolerating well  Hemoglobin has improved  She had an EGD and colonoscopy as below  These were unrevealing  She does not have any occult bleeding symptoms  Continue oral iron once daily  Repeat labs in 6 months     - CBC and differential  - Iron Panel; Future    2  Thrombocytosis ([address] )  History of thrombocytosis for many years with a negative workup  She does have underlying inflammatory condition  Iron deficiency could have been a contributing as well  Platelet count present has improved slightly  Continue monitoring     - CBC and differential      HPI:  74-year-old female with history of thrombocytosis and iron deficiency  Patient has had thrombocytosis for approximately 7 years  She previously was followed with [facility]   Patient had workup which was negative for JAK2 mutation, negative bcr/ABL  Reticulocyte count was normal   Iron levels in September 2018 showed chronic disease  Occult stool testing was negative  Sed rate and CRP were elevated this is likely due to her underlying rheumatological condition for which she follows with rheumatology and has been on prednisone 5 mg since August 2018  She has follow-up with rheumatology in the next 1 month  Repeat labs in January 2019 showed a ferritin of 12, previously 182 in August 2018  Hemoglobin in May 2019 decreased 11 3  Patient was advised on 05/24/2019 to start taking 1 iron tablet daily  She stated that she really had an EGD and colonoscopy with Dr Zeny Soares on 05/14/2019 and had polyps found in the stomach and colon which were all removed    She states today that she does not have further follow-up with him  ROS: Review of Systems   Constitutional: Negative for appetite change, chills, fever and unexpected weight change  Respiratory: Negative for cough and shortness of breath  Cardiovascular: Negative for chest pain, palpitations and leg swelling  Gastrointestinal: Negative for abdominal pain, blood in stool, constipation, diarrhea, nausea and vomiting  Darker stools since starting oral iron however none prior   Genitourinary: Negative for difficulty urinating, dysuria and hematuria  Musculoskeletal: Positive for arthralgias  Skin: Negative  Neurological: Negative for dizziness, weakness, light-headedness, numbness and headaches  Hematological: Negative  Psychiatric/Behavioral: Negative          Past Medical History:   Diagnosis Date    Arthritis     Hernia, umbilical     Hypertension     Spinal stenosis        Past Surgical History:   Procedure Laterality Date    FOOT SURGERY      HYSTERECTOMY         Social History     Socioeconomic History    Marital status: /Civil Union     Spouse name: Not on file    Number of children: Not on file    Years of education: Not on file    Highest education level: Not on file   Occupational History    Not on file   Social Needs    Financial resource strain: Not on file    Food insecurity:     Worry: Not on file     Inability: Not on file    Transportation needs:     Medical: Not on file     Non-medical: Not on file   Tobacco Use    Smoking status: Never Smoker    Smokeless tobacco: Never Used   Substance and Sexual Activity    Alcohol use: No    Drug use: No    Sexual activity: Not on file   Lifestyle    Physical activity:     Days per week: Not on file     Minutes per session: Not on file    Stress: Not on file   Relationships    Social connections:     Talks on phone: Not on file     Gets together: Not on file     Attends Scientology service: Not on file     Active member of club or organization: Not on file     Attends meetings of clubs or organizations: Not on file     Relationship status: Not on file    Intimate partner violence:     Fear of current or ex partner: Not on file     Emotionally abused: Not on file     Physically abused: Not on file     Forced sexual activity: Not on file   Other Topics Concern    Not on file   Social History Narrative    Not on file       Family History   Problem Relation Age of Onset    Hypertension Mother     Cancer Father        Allergies   Allergen Reactions    Aspirin     Ibuprofen          Current Outpatient Medications:     amLODIPine (NORVASC) 5 mg tablet, TK 1 T PO QD, Disp: , Rfl: 0    baclofen 10 mg tablet, Take by mouth, Disp: , Rfl:     Calcium Carbonate-Vitamin D 600-200 MG-UNIT TABS, Take by mouth, Disp: , Rfl:     celecoxib (CELEBREX) 200 mg capsule, Take by mouth, Disp: , Rfl:     Cholecalciferol (VITAMIN D PO), Take 5,000 Units by mouth, Disp: , Rfl:     denosumab (PROLIA) 60 mg/mL, Inject under the skin, Disp: , Rfl:     esomeprazole (NEXIUM) 40 MG capsule, Take by mouth, Disp: , Rfl:     levothyroxine 50 mcg tablet, TK 1 T PO QD UTD, Disp: , Rfl: 3    morphine (MS CONTIN) 60 mg 12 hr tablet, TK 1 T PO Q 8 HOURS PO PRN, Disp: , Rfl: 0    Multiple Vitamins-Minerals (CENTRUM SILVER 50+WOMEN) TABS, Take by mouth, Disp: , Rfl:     oxyCODONE-acetaminophen (PERCOCET) 7 5-325 MG per tablet, 1 tablet 3 times a day, Disp: , Rfl: 0    potassium chloride (MICRO-K) 10 MEQ CR capsule, Take by mouth, Disp: , Rfl:     predniSONE 5 mg tablet, , Disp: , Rfl: 0    rosuvastatin (CRESTOR) 5 mg tablet, Take by mouth, Disp: , Rfl:     telmisartan (MICARDIS) 80 MG tablet, Take 40 mg by mouth  , Disp: , Rfl:     zolpidem (AMBIEN) 5 mg tablet, Take by mouth, Disp: , Rfl:       Physical Exam:  /74 (BP Location: Left arm, Patient Position: Sitting, Cuff Size: Adult)   Pulse 77   Temp 98 7 °F (37 1 °C) (Oral)   Resp 16   Ht 5' (1 524 m)   Wt 65 5 kg (144 lb 8 oz)   SpO2 99%   BMI 28 22 kg/m²     Physical Exam   Constitutional: She is oriented to person, place, and time  She appears well-developed and well-nourished  No distress  HENT:   Head: Normocephalic and atraumatic  Eyes: Conjunctivae are normal  No scleral icterus  Neck: Normal range of motion  Neck supple  Cardiovascular: Normal rate, regular rhythm and normal heart sounds  No murmur heard  Pulmonary/Chest: Effort normal and breath sounds normal  No respiratory distress  Abdominal: Soft  There is no tenderness  Musculoskeletal: She exhibits no edema  Ambulates with walker  Brace present on right lower extremity   Lymphadenopathy:     She has no cervical adenopathy  Neurological: She is alert and oriented to person, place, and time  No cranial nerve deficit  Skin: Skin is warm and dry  There is pallor  Psychiatric: She has a normal mood and affect  Labs:  Lab Results   Component Value Date    WBC 6 81 07/05/2019    HGB 12 3 07/05/2019    HCT 39 5 07/05/2019    MCV 94 07/05/2019     (H) 07/05/2019         Patient voiced understanding and agreement in the above discussion  Aware to contact our office with questions/symptoms in the interim

## 2019-08-12 ENCOUNTER — HOSPITAL ENCOUNTER (OUTPATIENT)
Dept: RADIOLOGY | Facility: HOSPITAL | Age: 69
Discharge: HOME | End: 2019-08-12
Payer: MEDICARE

## 2019-08-12 ENCOUNTER — APPOINTMENT (OUTPATIENT)
Dept: LAB | Facility: CLINIC | Age: 69
End: 2019-08-12
Payer: MEDICARE

## 2019-08-12 ENCOUNTER — TRANSCRIBE ORDERS (OUTPATIENT)
Dept: RADIOLOGY | Facility: HOSPITAL | Age: 69
End: 2019-08-12

## 2019-08-12 DIAGNOSIS — M25.432 SWELLING OF JOINT OF BOTH WRISTS: ICD-10-CM

## 2019-08-12 DIAGNOSIS — M35.3 POLYMYALGIA RHEUMATICA (HCC): ICD-10-CM

## 2019-08-12 DIAGNOSIS — M25.431 SWELLING OF JOINT OF BOTH WRISTS: ICD-10-CM

## 2019-08-12 DIAGNOSIS — M35.3 POLYMYALGIA RHEUMATICA (HCC): Primary | ICD-10-CM

## 2019-08-12 LAB
C3 SERPL-MCNC: 163 MG/DL (ref 90–180)
C4 SERPL-MCNC: 45 MG/DL (ref 10–40)
CRP SERPL QL: 8.9 MG/L
ERYTHROCYTE [SEDIMENTATION RATE] IN BLOOD: 38 MM/HOUR (ref 0–20)

## 2019-08-12 PROCEDURE — 86200 CCP ANTIBODY: CPT

## 2019-08-12 PROCEDURE — 36415 COLL VENOUS BLD VENIPUNCTURE: CPT

## 2019-08-12 PROCEDURE — 85652 RBC SED RATE AUTOMATED: CPT

## 2019-08-12 PROCEDURE — 73100 X-RAY EXAM OF WRIST: CPT

## 2019-08-12 PROCEDURE — 86140 C-REACTIVE PROTEIN: CPT

## 2019-08-12 PROCEDURE — 86430 RHEUMATOID FACTOR TEST QUAL: CPT

## 2019-08-12 PROCEDURE — 86160 COMPLEMENT ANTIGEN: CPT

## 2019-08-12 PROCEDURE — 86038 ANTINUCLEAR ANTIBODIES: CPT

## 2019-08-13 LAB — RHEUMATOID FACT SER QL LA: NEGATIVE

## 2019-08-14 LAB — RYE IGE QN: NEGATIVE

## 2019-08-15 LAB — CCP IGA+IGG SERPL IA-ACNC: 3 UNITS (ref 0–19)

## 2019-11-11 ENCOUNTER — APPOINTMENT (OUTPATIENT)
Dept: LAB | Facility: CLINIC | Age: 69
End: 2019-11-11
Payer: MEDICARE

## 2019-11-11 DIAGNOSIS — D50.9 IRON DEFICIENCY ANEMIA, UNSPECIFIED IRON DEFICIENCY ANEMIA TYPE: ICD-10-CM

## 2019-11-11 LAB
BASOPHILS # BLD AUTO: 0.04 THOUSANDS/ΜL (ref 0–0.1)
BASOPHILS NFR BLD AUTO: 1 % (ref 0–1)
EOSINOPHIL # BLD AUTO: 0.35 THOUSAND/ΜL (ref 0–0.61)
EOSINOPHIL NFR BLD AUTO: 6 % (ref 0–6)
ERYTHROCYTE [DISTWIDTH] IN BLOOD BY AUTOMATED COUNT: 14.1 % (ref 11.6–15.1)
FERRITIN SERPL-MCNC: 68 NG/ML (ref 8–388)
HCT VFR BLD AUTO: 42.9 % (ref 34.8–46.1)
HGB BLD-MCNC: 13.4 G/DL (ref 11.5–15.4)
IMM GRANULOCYTES # BLD AUTO: 0.01 THOUSAND/UL (ref 0–0.2)
IMM GRANULOCYTES NFR BLD AUTO: 0 % (ref 0–2)
IRON SATN MFR SERPL: 19 %
IRON SERPL-MCNC: 66 UG/DL (ref 50–170)
LYMPHOCYTES # BLD AUTO: 1.57 THOUSANDS/ΜL (ref 0.6–4.47)
LYMPHOCYTES NFR BLD AUTO: 27 % (ref 14–44)
MCH RBC QN AUTO: 29.6 PG (ref 26.8–34.3)
MCHC RBC AUTO-ENTMCNC: 31.2 G/DL (ref 31.4–37.4)
MCV RBC AUTO: 95 FL (ref 82–98)
MONOCYTES # BLD AUTO: 0.57 THOUSAND/ΜL (ref 0.17–1.22)
MONOCYTES NFR BLD AUTO: 10 % (ref 4–12)
NEUTROPHILS # BLD AUTO: 3.22 THOUSANDS/ΜL (ref 1.85–7.62)
NEUTS SEG NFR BLD AUTO: 56 % (ref 43–75)
NRBC BLD AUTO-RTO: 0 /100 WBCS
PLATELET # BLD AUTO: 459 THOUSANDS/UL (ref 149–390)
PMV BLD AUTO: 8.8 FL (ref 8.9–12.7)
RBC # BLD AUTO: 4.53 MILLION/UL (ref 3.81–5.12)
TIBC SERPL-MCNC: 342 UG/DL (ref 250–450)
WBC # BLD AUTO: 5.76 THOUSAND/UL (ref 4.31–10.16)

## 2019-11-11 PROCEDURE — 85025 COMPLETE CBC W/AUTO DIFF WBC: CPT | Performed by: PHYSICIAN ASSISTANT

## 2019-11-11 PROCEDURE — 82728 ASSAY OF FERRITIN: CPT

## 2019-11-11 PROCEDURE — 83550 IRON BINDING TEST: CPT

## 2019-11-11 PROCEDURE — 36415 COLL VENOUS BLD VENIPUNCTURE: CPT

## 2019-11-11 PROCEDURE — 83540 ASSAY OF IRON: CPT

## 2019-12-09 ENCOUNTER — TELEPHONE (OUTPATIENT)
Dept: HEMATOLOGY ONCOLOGY | Facility: CLINIC | Age: 69
End: 2019-12-09

## 2019-12-09 NOTE — TELEPHONE ENCOUNTER
Spoke to patient and informed her that her appt on 1/21/20 needed to be R/S  Her new appt is 2/4/20 at 11:00 am with HANNAH Peacock at the [facility] location  Patient was fine with the appt change

## 2020-01-13 ENCOUNTER — APPOINTMENT (OUTPATIENT)
Dept: LAB | Facility: CLINIC | Age: 70
End: 2020-01-13
Payer: MEDICARE

## 2020-01-13 ENCOUNTER — TRANSCRIBE ORDERS (OUTPATIENT)
Dept: LAB | Facility: CLINIC | Age: 70
End: 2020-01-13

## 2020-01-13 DIAGNOSIS — E83.52 HYPERCALCEMIA: ICD-10-CM

## 2020-01-13 DIAGNOSIS — M35.3 POLYMYALGIA RHEUMATICA (HCC): ICD-10-CM

## 2020-01-13 DIAGNOSIS — M35.3 POLYMYALGIA RHEUMATICA (HCC): Primary | ICD-10-CM

## 2020-01-13 DIAGNOSIS — M11.20 PSEUDOGOUT: ICD-10-CM

## 2020-01-13 LAB
ALBUMIN SERPL BCP-MCNC: 3.8 G/DL (ref 3.5–5)
BASOPHILS # BLD AUTO: 0.03 THOUSANDS/ΜL (ref 0–0.1)
BASOPHILS NFR BLD AUTO: 1 % (ref 0–1)
CALCIUM ALBUM COR SERPL-MCNC: 10.7 MG/DL (ref 8.3–10.1)
CALCIUM SERPL-MCNC: 10.5 MG/DL (ref 8.3–10.1)
CALCIUM SERPL-MCNC: 10.5 MG/DL (ref 8.3–10.1)
CRP SERPL QL: <3 MG/L
EOSINOPHIL # BLD AUTO: 0.14 THOUSAND/ΜL (ref 0–0.61)
EOSINOPHIL NFR BLD AUTO: 3 % (ref 0–6)
ERYTHROCYTE [DISTWIDTH] IN BLOOD BY AUTOMATED COUNT: 13.2 % (ref 11.6–15.1)
ERYTHROCYTE [SEDIMENTATION RATE] IN BLOOD: 11 MM/HOUR (ref 0–20)
HCT VFR BLD AUTO: 42 % (ref 34.8–46.1)
HGB BLD-MCNC: 13.4 G/DL (ref 11.5–15.4)
IMM GRANULOCYTES # BLD AUTO: 0.01 THOUSAND/UL (ref 0–0.2)
IMM GRANULOCYTES NFR BLD AUTO: 0 % (ref 0–2)
LYMPHOCYTES # BLD AUTO: 1.23 THOUSANDS/ΜL (ref 0.6–4.47)
LYMPHOCYTES NFR BLD AUTO: 24 % (ref 14–44)
MCH RBC QN AUTO: 30.4 PG (ref 26.8–34.3)
MCHC RBC AUTO-ENTMCNC: 31.9 G/DL (ref 31.4–37.4)
MCV RBC AUTO: 95 FL (ref 82–98)
MONOCYTES # BLD AUTO: 0.4 THOUSAND/ΜL (ref 0.17–1.22)
MONOCYTES NFR BLD AUTO: 8 % (ref 4–12)
NEUTROPHILS # BLD AUTO: 3.42 THOUSANDS/ΜL (ref 1.85–7.62)
NEUTS SEG NFR BLD AUTO: 64 % (ref 43–75)
NRBC BLD AUTO-RTO: 0 /100 WBCS
PLATELET # BLD AUTO: 499 THOUSANDS/UL (ref 149–390)
PMV BLD AUTO: 8.8 FL (ref 8.9–12.7)
PTH-INTACT SERPL-MCNC: 35.4 PG/ML (ref 18.4–80.1)
RBC # BLD AUTO: 4.41 MILLION/UL (ref 3.81–5.12)
WBC # BLD AUTO: 5.23 THOUSAND/UL (ref 4.31–10.16)

## 2020-01-13 PROCEDURE — 82040 ASSAY OF SERUM ALBUMIN: CPT

## 2020-01-13 PROCEDURE — 85652 RBC SED RATE AUTOMATED: CPT

## 2020-01-13 PROCEDURE — 84165 PROTEIN E-PHORESIS SERUM: CPT | Performed by: PATHOLOGY

## 2020-01-13 PROCEDURE — 36415 COLL VENOUS BLD VENIPUNCTURE: CPT

## 2020-01-13 PROCEDURE — 86140 C-REACTIVE PROTEIN: CPT

## 2020-01-13 PROCEDURE — 83970 ASSAY OF PARATHORMONE: CPT

## 2020-01-13 PROCEDURE — 85025 COMPLETE CBC W/AUTO DIFF WBC: CPT

## 2020-01-13 PROCEDURE — 84165 PROTEIN E-PHORESIS SERUM: CPT

## 2020-01-13 PROCEDURE — 82310 ASSAY OF CALCIUM: CPT

## 2020-01-14 LAB
ALBUMIN SERPL ELPH-MCNC: 4.17 G/DL (ref 3.5–5)
ALBUMIN SERPL ELPH-MCNC: 59.6 % (ref 52–65)
ALPHA1 GLOB SERPL ELPH-MCNC: 0.39 G/DL (ref 0.1–0.4)
ALPHA1 GLOB SERPL ELPH-MCNC: 5.5 % (ref 2.5–5)
ALPHA2 GLOB SERPL ELPH-MCNC: 1.04 G/DL (ref 0.4–1.2)
ALPHA2 GLOB SERPL ELPH-MCNC: 14.8 % (ref 7–13)
BETA GLOB ABNORMAL SERPL ELPH-MCNC: 0.41 G/DL (ref 0.4–0.8)
BETA1 GLOB SERPL ELPH-MCNC: 5.9 % (ref 5–13)
BETA2 GLOB SERPL ELPH-MCNC: 5.1 % (ref 2–8)
BETA2+GAMMA GLOB SERPL ELPH-MCNC: 0.36 G/DL (ref 0.2–0.5)
GAMMA GLOB ABNORMAL SERPL ELPH-MCNC: 0.64 G/DL (ref 0.5–1.6)
GAMMA GLOB SERPL ELPH-MCNC: 9.1 % (ref 12–22)
IGG/ALB SER: 1.48 {RATIO} (ref 1.1–1.8)
PROT PATTERN SERPL ELPH-IMP: ABNORMAL
PROT SERPL-MCNC: 7 G/DL (ref 6.4–8.2)

## 2020-01-27 ENCOUNTER — APPOINTMENT (OUTPATIENT)
Dept: LAB | Facility: CLINIC | Age: 70
End: 2020-01-27
Payer: MEDICARE

## 2020-02-03 ENCOUNTER — TELEPHONE (OUTPATIENT)
Dept: HEMATOLOGY ONCOLOGY | Facility: CLINIC | Age: 70
End: 2020-02-03

## 2020-02-03 DIAGNOSIS — D50.9 IRON DEFICIENCY ANEMIA, UNSPECIFIED IRON DEFICIENCY ANEMIA TYPE: Primary | ICD-10-CM

## 2020-02-03 DIAGNOSIS — D75.839 THROMBOCYTOSIS: ICD-10-CM

## 2020-02-03 NOTE — TELEPHONE ENCOUNTER
Spoke to patient and informed her that labs need to be completed prior to her appt  Patient stated she will get them done prior to appt

## 2020-02-04 ENCOUNTER — OFFICE VISIT (OUTPATIENT)
Dept: HEMATOLOGY ONCOLOGY | Facility: CLINIC | Age: 70
End: 2020-02-04
Payer: MEDICARE

## 2020-02-04 ENCOUNTER — APPOINTMENT (OUTPATIENT)
Dept: LAB | Facility: CLINIC | Age: 70
End: 2020-02-04
Payer: MEDICARE

## 2020-02-04 VITALS
DIASTOLIC BLOOD PRESSURE: 74 MMHG | OXYGEN SATURATION: 100 % | SYSTOLIC BLOOD PRESSURE: 132 MMHG | HEART RATE: 57 BPM | RESPIRATION RATE: 20 BRPM | TEMPERATURE: 98.3 F

## 2020-02-04 DIAGNOSIS — D75.839 THROMBOCYTOSIS: ICD-10-CM

## 2020-02-04 DIAGNOSIS — D50.9 IRON DEFICIENCY ANEMIA, UNSPECIFIED IRON DEFICIENCY ANEMIA TYPE: ICD-10-CM

## 2020-02-04 DIAGNOSIS — D50.9 IRON DEFICIENCY ANEMIA, UNSPECIFIED IRON DEFICIENCY ANEMIA TYPE: Primary | ICD-10-CM

## 2020-02-04 LAB
BASOPHILS # BLD AUTO: 0.06 THOUSANDS/ΜL (ref 0–0.1)
BASOPHILS NFR BLD AUTO: 1 % (ref 0–1)
EOSINOPHIL # BLD AUTO: 0.26 THOUSAND/ΜL (ref 0–0.61)
EOSINOPHIL NFR BLD AUTO: 5 % (ref 0–6)
ERYTHROCYTE [DISTWIDTH] IN BLOOD BY AUTOMATED COUNT: 13.6 % (ref 11.6–15.1)
FERRITIN SERPL-MCNC: 50 NG/ML (ref 8–388)
HCT VFR BLD AUTO: 42.7 % (ref 34.8–46.1)
HGB BLD-MCNC: 13.4 G/DL (ref 11.5–15.4)
IMM GRANULOCYTES # BLD AUTO: 0.01 THOUSAND/UL (ref 0–0.2)
IMM GRANULOCYTES NFR BLD AUTO: 0 % (ref 0–2)
IRON SATN MFR SERPL: 19 %
IRON SERPL-MCNC: 68 UG/DL (ref 50–170)
LYMPHOCYTES # BLD AUTO: 1.44 THOUSANDS/ΜL (ref 0.6–4.47)
LYMPHOCYTES NFR BLD AUTO: 28 % (ref 14–44)
MCH RBC QN AUTO: 30.2 PG (ref 26.8–34.3)
MCHC RBC AUTO-ENTMCNC: 31.4 G/DL (ref 31.4–37.4)
MCV RBC AUTO: 96 FL (ref 82–98)
MONOCYTES # BLD AUTO: 0.51 THOUSAND/ΜL (ref 0.17–1.22)
MONOCYTES NFR BLD AUTO: 10 % (ref 4–12)
NEUTROPHILS # BLD AUTO: 2.81 THOUSANDS/ΜL (ref 1.85–7.62)
NEUTS SEG NFR BLD AUTO: 56 % (ref 43–75)
NRBC BLD AUTO-RTO: 0 /100 WBCS
PLATELET # BLD AUTO: 514 THOUSANDS/UL (ref 149–390)
PMV BLD AUTO: 8.4 FL (ref 8.9–12.7)
RBC # BLD AUTO: 4.44 MILLION/UL (ref 3.81–5.12)
TIBC SERPL-MCNC: 359 UG/DL (ref 250–450)
WBC # BLD AUTO: 5.09 THOUSAND/UL (ref 4.31–10.16)

## 2020-02-04 PROCEDURE — 85025 COMPLETE CBC W/AUTO DIFF WBC: CPT

## 2020-02-04 PROCEDURE — 83550 IRON BINDING TEST: CPT

## 2020-02-04 PROCEDURE — 99213 OFFICE O/P EST LOW 20 MIN: CPT | Performed by: PHYSICIAN ASSISTANT

## 2020-02-04 PROCEDURE — 36415 COLL VENOUS BLD VENIPUNCTURE: CPT

## 2020-02-04 PROCEDURE — 82728 ASSAY OF FERRITIN: CPT

## 2020-02-04 PROCEDURE — 83540 ASSAY OF IRON: CPT

## 2020-02-04 NOTE — PROGRESS NOTES
Hematology/Oncology Outpatient Follow-up  Edmar Valderrama 81 y o  female 1950 [id number]    Date:  2/4/2020      Assessment and Plan:    1  Iron deficiency anemia, unspecified iron deficiency anemia type  68-year-old female presents for follow-up regarding history of iron deficiency anemia  She had GI workup as below  Her iron studies are improving  Hemoglobin is normal   Advised to continue oral iron  - CBC and differential; Future  - Iron Panel (Includes Ferritin, Iron Sat%, Iron, and TIBC); Future    2  Thrombocytosis  She also has had chronic thrombocytosis which has remained relatively stable  Workup was negative  Continue to monitor  Likely this is reactive to her underlying inflammatory condition  HPI:  68-year-old female with history of thrombocytosis and iron deficiency  Patient has had thrombocytosis for approximately 7 years  She previously was followed with [facility]   Patient had workup which was negative for JAK2 mutation, negative bcr/ABL  Reticulocyte count was normal   Iron levels in September 2018 showed chronic disease  Occult stool testing was negative  Sed rate and CRP were elevated this is likely due to her underlying rheumatological condition for which she follows with rheumatology and has been on prednisone 5 mg since August 2018  She has follow-up with rheumatology in the next 1 month      Repeat labs in January 2019 showed a ferritin of 12, previously 182 in August 2018  Hemoglobin in May 2019 decreased 11 3  Patient was advised on 05/24/2019 to start taking 1 iron tablet daily      She stated that she really had an EGD and colonoscopy with Dr Morenita Levi on 05/14/2019 and had polyps found in the stomach and colon which were all removed  Interval history: no complaints  Continues to tolerate oral iron well       ROS: Review of Systems   Constitutional: Negative for activity change, appetite change, chills, diaphoresis, fatigue, fever and unexpected weight change  HENT: Negative  Eyes: Negative  Respiratory: Negative for apnea, cough, chest tightness and shortness of breath  Cardiovascular: Negative for chest pain and leg swelling  Gastrointestinal: Negative for abdominal distention, abdominal pain, anal bleeding, blood in stool, constipation, diarrhea and nausea  Endocrine: Negative  Genitourinary: Negative  Musculoskeletal: Negative  Skin: Negative  Hematological: Negative for adenopathy  Does not bruise/bleed easily         Past Medical History:   Diagnosis Date    Arthritis     Hernia, umbilical     Hypertension     Spinal stenosis        Past Surgical History:   Procedure Laterality Date    FOOT SURGERY      HYSTERECTOMY         Social History     Socioeconomic History    Marital status: /Civil Union     Spouse name: None    Number of children: None    Years of education: None    Highest education level: None   Occupational History    None   Social Needs    Financial resource strain: None    Food insecurity:     Worry: None     Inability: None    Transportation needs:     Medical: None     Non-medical: None   Tobacco Use    Smoking status: Never Smoker    Smokeless tobacco: Never Used   Substance and Sexual Activity    Alcohol use: No    Drug use: No    Sexual activity: None   Lifestyle    Physical activity:     Days per week: None     Minutes per session: None    Stress: None   Relationships    Social connections:     Talks on phone: None     Gets together: None     Attends Alevism service: None     Active member of club or organization: None     Attends meetings of clubs or organizations: None     Relationship status: None    Intimate partner violence:     Fear of current or ex partner: None     Emotionally abused: None     Physically abused: None     Forced sexual activity: None   Other Topics Concern    None   Social History Narrative    None       Family History   Problem Relation Age of Onset    Hypertension Mother     Cancer Father        Allergies   Allergen Reactions    Aspirin     Ibuprofen          Current Outpatient Medications:     baclofen 10 mg tablet, Take 20 mg by mouth , Disp: , Rfl:     celecoxib (CELEBREX) 200 mg capsule, Take by mouth, Disp: , Rfl:     denosumab (PROLIA) 60 mg/mL, Inject under the skin, Disp: , Rfl:     esomeprazole (NEXIUM) 40 MG capsule, Take by mouth, Disp: , Rfl:     levothyroxine 50 mcg tablet, TK 1 T PO QD UTD, Disp: , Rfl: 3    Multiple Vitamins-Minerals (CENTRUM SILVER 50+WOMEN) TABS, Take by mouth, Disp: , Rfl:     potassium chloride (MICRO-K) 10 MEQ CR capsule, Take by mouth, Disp: , Rfl:     rosuvastatin (CRESTOR) 5 mg tablet, Take by mouth, Disp: , Rfl:     telmisartan (MICARDIS) 80 MG tablet, Take 40 mg by mouth  , Disp: , Rfl:     zolpidem (AMBIEN) 5 mg tablet, Take by mouth, Disp: , Rfl:     amLODIPine (NORVASC) 5 mg tablet, TK 1 T PO QD, Disp: , Rfl: 0    Calcium Carbonate-Vitamin D 600-200 MG-UNIT TABS, Take by mouth, Disp: , Rfl:     Cholecalciferol (VITAMIN D PO), Take 5,000 Units by mouth, Disp: , Rfl:     morphine (MS CONTIN) 60 mg 12 hr tablet, TK 1 T PO Q 8 HOURS PO PRN, Disp: , Rfl: 0    oxyCODONE-acetaminophen (PERCOCET) 7 5-325 MG per tablet, 1 tablet 3 times a day, Disp: , Rfl: 0    predniSONE 5 mg tablet, , Disp: , Rfl: 0      Physical Exam:  /74 (BP Location: Left arm, Patient Position: Sitting, Cuff Size: Adult)   Pulse 57   Temp 98 3 °F (36 8 °C)   Resp 20   SpO2 100%     Physical Exam   Constitutional: She is oriented to person, place, and time  She appears well-developed and well-nourished  No distress  HENT:   Head: Normocephalic and atraumatic  Eyes: Conjunctivae and EOM are normal  No scleral icterus  Neck: Normal range of motion  Neck supple  Cardiovascular: Normal rate, regular rhythm and normal heart sounds  No murmur heard    Pulmonary/Chest: Effort normal and breath sounds normal  No respiratory distress  Abdominal: Soft  Bowel sounds are normal  There is no tenderness  Musculoskeletal: Normal range of motion  She exhibits no edema or tenderness  In wheelchair    Lymphadenopathy:     She has no cervical adenopathy  Neurological: She is alert and oriented to person, place, and time  No cranial nerve deficit  Skin: Skin is warm and dry  Psychiatric: She has a normal mood and affect  Vitals reviewed  Labs:  Lab Results   Component Value Date    WBC 5 09 02/04/2020    HGB 13 4 02/04/2020    HCT 42 7 02/04/2020    MCV 96 02/04/2020     (H) 02/04/2020     Lab Results   Component Value Date    K 4 9 08/28/2018     08/28/2018    CO2 23 08/28/2018    BUN 19 08/28/2018    CREATININE 0 78 01/27/2020    GLUF 93 08/28/2018    CALCIUM 10 2 (H) 01/27/2020    CORRECTEDCA 10 3 (H) 01/27/2020    AST 12 10/01/2018    ALT 21 10/01/2018    ALKPHOS 74 10/01/2018    EGFR 79 01/27/2020       Patient voiced understanding and agreement in the above discussion  Aware to contact our office with questions/symptoms in the interim  This note has been generated by voice recognition software system  Therefore, there may be spelling, grammar, and or syntax errors  Please contact if questions arise

## 2020-02-29 ENCOUNTER — OFFICE VISIT (OUTPATIENT)
Dept: URGENT CARE | Facility: CLINIC | Age: 70
End: 2020-02-29
Payer: MEDICARE

## 2020-02-29 VITALS
RESPIRATION RATE: 16 BRPM | BODY MASS INDEX: 27.18 KG/M2 | HEIGHT: 57 IN | SYSTOLIC BLOOD PRESSURE: 100 MMHG | HEART RATE: 71 BPM | OXYGEN SATURATION: 98 % | TEMPERATURE: 98.1 F | WEIGHT: 126 LBS | DIASTOLIC BLOOD PRESSURE: 72 MMHG

## 2020-02-29 DIAGNOSIS — B30.9 ACUTE VIRAL CONJUNCTIVITIS OF LEFT EYE: Primary | ICD-10-CM

## 2020-02-29 PROCEDURE — 99203 OFFICE O/P NEW LOW 30 MIN: CPT | Performed by: PHYSICIAN ASSISTANT

## 2020-02-29 PROCEDURE — G0463 HOSPITAL OUTPT CLINIC VISIT: HCPCS | Performed by: PHYSICIAN ASSISTANT

## 2020-02-29 RX ORDER — OLOPATADINE HYDROCHLORIDE 1 MG/ML
1 SOLUTION/ DROPS OPHTHALMIC 2 TIMES DAILY
Qty: 5 ML | Refills: 0 | Status: SHIPPED | OUTPATIENT
Start: 2020-02-29 | End: 2020-07-30

## 2020-02-29 NOTE — PATIENT INSTRUCTIONS
Conjunctivitis   AMBULATORY CARE:   Conjunctivitis,  or pink eye, is inflammation of your conjunctiva  The conjunctiva is a thin tissue that covers the front of your eye and the back of your eyelids  The conjunctiva helps protect your eye and keep it moist  Conjunctivitis may be caused by bacteria, allergies, or a virus  If your conjunctivitis is caused by bacteria, it may get better on its own in about 7 days  Viral conjunctivitis can last up to 3 weeks  Common symptoms may include any of the following: You will usually have symptoms in both eyes if your conjunctivitis is caused by allergies  You may also have other allergic symptoms, such as a rash or runny nose  Symptoms will usually start in 1 eye if your conjunctivitis is caused by a virus or bacteria  · Redness in the whites of your eye    · Itching in your eye or around your eye    · Feeling like there is something in your eye    · Watery or thick, sticky discharge    · Crusty eyelids when you wake up in the morning    · Burning, stinging, or swelling in your eye    · Pain when you see bright light  Seek care immediately if:   · You have worsening eye pain  · The swelling in your eye gets worse, even after treatment  · Your vision suddenly becomes worse or you cannot see at all  Contact your healthcare provider if:   · You develop a fever and ear pain  · You have tiny bumps or spots of blood on your eye  · You have questions or concerns about your condition or care  Treatment  will depend on the cause of your conjunctivitis  You may need antibiotics or allergy medicine as a pill, eye drop, or eye ointment  Manage your symptoms:   · Apply a cool compress  Wet a washcloth with cold water and place it on your eye  This will help decrease itching and irritation  · Do not wear contact lenses  They can irritate your eye  Throw away the pair you are using and ask when you can wear them again   Use a new pair of lenses when your healthcare provider says it is okay  · Avoid irritants  Stay away from smoke filled areas  Shield your eyes from wind and sun  · Flush your eye  You may need to flush your eye with saline to help decrease your symptoms  Ask for more information on how to flush your eye  Medicines:  Treatment depends on what is causing your conjunctivitis  You may be given any of the following:  · Allergy medicine  helps decrease itchy, red, swollen eyes caused by allergies  It may be given as a pill, eye drops, or nasal spray  · Antibiotics  may be needed if your conjunctivitis is caused by bacteria  This medicine may be given as a pill, eye drops, or eye ointment  · Take your medicine as directed  Contact your healthcare provider if you think your medicine is not helping or if you have side effects  Tell him or her if you are allergic to any medicine  Keep a list of the medicines, vitamins, and herbs you take  Include the amounts, and when and why you take them  Bring the list or the pill bottles to follow-up visits  Carry your medicine list with you in case of an emergency  Prevent the spread of conjunctivitis:   · Wash your hands with soap and water often  Wash your hands before and after you touch your eyes  Also wash your hands before you prepare or eat food and after you use the bathroom or change a diaper  · Avoid allergens  Try to avoid the things that cause your allergies, such as pets, dust, or grass  · Avoid contact with others  Do not share towels or washcloths  Try to stay away from others as much as possible  Ask when you can return to work or school  · Throw away eye makeup  The bacteria that caused your conjunctivitis can stay in eye makeup  Throw away mascara and other eye makeup  © 2017 [employer]  Information is for End User's use only and may not be sold, redistributed or otherwise used for commercial purposes   All illustrations and images included in [employer] [facility] are the copyrighted property of A [employer] M [employer]  or [employer]  The above information is an  only  It is not intended as medical advice for individual conditions or treatments  Talk to your doctor, nurse or pharmacist before following any medical regimen to see if it is safe and effective for you

## 2020-02-29 NOTE — PROGRESS NOTES
[facility]        NAME: Maribel Porter is a 78 y o  female  : 1950    MRN: [id number]  DATE: 2020  TIME: 12:23 PM    Assessment and Plan   Acute viral conjunctivitis of left eye [B30 9]  1  Acute viral conjunctivitis of left eye  olopatadine (PATANOL) 0 1 % ophthalmic solution         Patient Instructions       Follow up with PCP in 3-5 days  Proceed to  ER if symptoms worsen  Chief Complaint     Chief Complaint   Patient presents with    eye irritation     left eye/upper/lower lid and surrounding area red/swollen/itchy x yesterday         History of Present Illness       Conjunctivitis    The current episode started yesterday  The onset was gradual  The problem has been gradually improving  The problem is mild  Nothing relieves the symptoms  Associated symptoms include eye itching, eye discharge ("tearing") and eye redness  Pertinent negatives include no orthopnea, no fever, no decreased vision, no double vision, no photophobia, no abdominal pain, no constipation, no nausea, no vomiting, no congestion, no ear discharge, no ear pain, no headaches, no hearing loss, no mouth sores, no rhinorrhea, no sore throat, no stridor, no swollen glands, no cough, no URI, no wheezing, no rash and no eye pain  Review of Systems   Review of Systems   Constitutional: Negative for chills, fatigue and fever  HENT: Negative for congestion, ear discharge, ear pain, hearing loss, mouth sores, postnasal drip, rhinorrhea, sinus pressure, sinus pain and sore throat  Eyes: Positive for discharge ("tearing"), redness and itching  Negative for double vision, photophobia and pain  Respiratory: Negative for cough, chest tightness, shortness of breath, wheezing and stridor  Cardiovascular: Negative for chest pain and orthopnea  Gastrointestinal: Negative for abdominal pain, constipation, nausea and vomiting  Genitourinary: Negative for difficulty urinating     Musculoskeletal: Negative for arthralgias and myalgias  Skin: Negative for rash  Neurological: Negative for dizziness and headaches  Psychiatric/Behavioral: Negative for behavioral problems           Current Medications       Current Outpatient Medications:     baclofen 10 mg tablet, Take 20 mg by mouth , Disp: , Rfl:     celecoxib (CELEBREX) 200 mg capsule, Take by mouth, Disp: , Rfl:     Cholecalciferol (VITAMIN D PO), Take 5,000 Units by mouth, Disp: , Rfl:     denosumab (PROLIA) 60 mg/mL, Inject under the skin, Disp: , Rfl:     esomeprazole (NEXIUM) 40 MG capsule, Take by mouth, Disp: , Rfl:     levothyroxine 50 mcg tablet, TK 1 T PO QD UTD, Disp: , Rfl: 3    Multiple Vitamins-Minerals (CENTRUM SILVER 50+WOMEN) TABS, Take by mouth, Disp: , Rfl:     potassium chloride (MICRO-K) 10 MEQ CR capsule, Take by mouth, Disp: , Rfl:     rosuvastatin (CRESTOR) 5 mg tablet, Take by mouth, Disp: , Rfl:     telmisartan (MICARDIS) 80 MG tablet, Take 40 mg by mouth  , Disp: , Rfl:     Calcium Carbonate-Vitamin D 600-200 MG-UNIT TABS, Take by mouth, Disp: , Rfl:     morphine (MS CONTIN) 60 mg 12 hr tablet, TK 1 T PO Q 8 HOURS PO PRN, Disp: , Rfl: 0    olopatadine (PATANOL) 0 1 % ophthalmic solution, Administer 1 drop to both eyes 2 (two) times a day for 7 days, Disp: 5 mL, Rfl: 0    oxyCODONE-acetaminophen (PERCOCET) 7 5-325 MG per tablet, 1 tablet 3 times a day, Disp: , Rfl: 0    predniSONE 5 mg tablet, , Disp: , Rfl: 0    zolpidem (AMBIEN) 5 mg tablet, Take by mouth, Disp: , Rfl:     Current Allergies     Allergies as of 02/29/2020 - Reviewed 02/29/2020   Allergen Reaction Noted    Aspirin  11/23/2016    Ibuprofen  11/23/2016            The following portions of the patient's history were reviewed and updated as appropriate: allergies, current medications, past family history, past medical history, past social history, past surgical history and problem list      Past Medical History:   Diagnosis Date    Arthritis     Hernia, umbilical     Hypertension     Spinal stenosis        Past Surgical History:   Procedure Laterality Date    FOOT SURGERY      HERNIA REPAIR      HYSTERECTOMY         Family History   Problem Relation Age of Onset    Hypertension Mother     Cancer Father          Medications have been verified  Objective   /72   Pulse 71   Temp 98 1 °F (36 7 °C)   Resp 16   Ht 4' 9" (1 448 m)   Wt 57 2 kg (126 lb) Comment: pt reported  SpO2 98%   BMI 27 27 kg/m²        Physical Exam     Physical Exam   Constitutional: She is oriented to person, place, and time  She appears well-developed and well-nourished  HENT:   Right Ear: Tympanic membrane and external ear normal    Left Ear: Tympanic membrane and external ear normal    Eyes: Pupils are equal, round, and reactive to light  Lids are normal  Left eye exhibits no hordeolum  Left conjunctiva is not injected  Left conjunctiva has no hemorrhage  No scleral icterus  Neck: Normal range of motion  No edema present  Cardiovascular: Normal rate, regular rhythm, S1 normal, S2 normal and normal heart sounds  No murmur heard  Pulmonary/Chest: Effort normal and breath sounds normal  No respiratory distress  She has no wheezes  She has no rales  She exhibits no tenderness  Lymphadenopathy:     She has no cervical adenopathy  Neurological: She is alert and oriented to person, place, and time  Skin: Skin is warm, dry and intact  No rash noted  Psychiatric: She has a normal mood and affect  Her speech is normal and behavior is normal    Nursing note and vitals reviewed

## 2020-04-05 DIAGNOSIS — E87.6 HYPOPOTASSEMIA: ICD-10-CM

## 2020-04-05 DIAGNOSIS — E03.9 HYPOTHYROIDISM, UNSPECIFIED TYPE: ICD-10-CM

## 2020-04-05 DIAGNOSIS — M62.838 MUSCLE SPASM OF BOTH LOWER LEGS: ICD-10-CM

## 2020-04-05 DIAGNOSIS — I10 HTN (HYPERTENSION), BENIGN: Primary | ICD-10-CM

## 2020-04-05 DIAGNOSIS — E78.2 MIXED HYPERLIPIDEMIA: ICD-10-CM

## 2020-04-06 DIAGNOSIS — M62.838 MUSCLE SPASM: Primary | ICD-10-CM

## 2020-04-06 DIAGNOSIS — E87.6 K DEFICIENCY: ICD-10-CM

## 2020-04-06 DIAGNOSIS — E03.9 HYPOTHYROIDISM, UNSPECIFIED TYPE: ICD-10-CM

## 2020-04-06 RX ORDER — BACLOFEN 20 MG/1
TABLET ORAL
Qty: 120 TABLET | Refills: 6 | Status: SHIPPED | OUTPATIENT
Start: 2020-04-06 | End: 2020-11-08

## 2020-04-06 RX ORDER — ROSUVASTATIN CALCIUM 20 MG/1
1 TABLET, COATED ORAL DAILY
COMMUNITY
Start: 2020-03-10 | End: 2020-04-06 | Stop reason: SDUPTHER

## 2020-04-06 RX ORDER — ROSUVASTATIN CALCIUM 20 MG/1
TABLET, COATED ORAL
Qty: 30 TABLET | Refills: 6 | Status: SHIPPED | OUTPATIENT
Start: 2020-04-06 | End: 2020-11-08

## 2020-04-06 RX ORDER — LEVOTHYROXINE SODIUM 0.05 MG/1
TABLET ORAL
Qty: 30 TABLET | Refills: 6 | Status: SHIPPED | OUTPATIENT
Start: 2020-04-06 | End: 2020-11-08

## 2020-04-06 RX ORDER — TELMISARTAN AND HYDROCHLORTHIAZIDE 80; 12.5 MG/1; MG/1
1 TABLET ORAL DAILY
COMMUNITY
Start: 2020-03-10 | End: 2020-04-06

## 2020-04-06 RX ORDER — POTASSIUM CHLORIDE 750 MG/1
10 CAPSULE, EXTENDED RELEASE ORAL 2 TIMES DAILY
Qty: 120 CAPSULE | Refills: 6 | Status: SHIPPED | OUTPATIENT
Start: 2020-04-06 | End: 2020-04-06

## 2020-04-06 RX ORDER — BACLOFEN 20 MG/1
20 TABLET ORAL 4 TIMES DAILY
Qty: 120 TABLET | Refills: 6 | Status: SHIPPED | OUTPATIENT
Start: 2020-04-06 | End: 2020-04-06

## 2020-04-06 RX ORDER — BACLOFEN 20 MG/1
1 TABLET ORAL 4 TIMES DAILY
COMMUNITY
Start: 2020-03-08 | End: 2020-04-06 | Stop reason: SDUPTHER

## 2020-04-06 RX ORDER — ROSUVASTATIN CALCIUM 20 MG/1
20 TABLET, COATED ORAL DAILY
Qty: 30 TABLET | Refills: 6 | Status: SHIPPED | OUTPATIENT
Start: 2020-04-06 | End: 2020-04-06

## 2020-04-06 RX ORDER — TELMISARTAN AND HYDROCHLORTHIAZIDE 80; 12.5 MG/1; MG/1
TABLET ORAL
Qty: 30 TABLET | Refills: 6 | Status: SHIPPED | OUTPATIENT
Start: 2020-04-06 | End: 2020-12-18 | Stop reason: SDUPTHER

## 2020-04-06 RX ORDER — LEVOTHYROXINE SODIUM 0.05 MG/1
50 TABLET ORAL DAILY
Qty: 90 TABLET | Refills: 3 | Status: SHIPPED | OUTPATIENT
Start: 2020-04-06 | End: 2020-04-06

## 2020-04-06 RX ORDER — POTASSIUM CHLORIDE 750 MG/1
CAPSULE, EXTENDED RELEASE ORAL
Qty: 120 CAPSULE | Refills: 6 | Status: SHIPPED | OUTPATIENT
Start: 2020-04-06 | End: 2020-11-08

## 2020-04-20 ENCOUNTER — TELEMEDICINE (OUTPATIENT)
Dept: FAMILY MEDICINE CLINIC | Facility: CLINIC | Age: 70
End: 2020-04-20
Payer: MEDICARE

## 2020-04-20 DIAGNOSIS — D50.9 IRON DEFICIENCY ANEMIA, UNSPECIFIED IRON DEFICIENCY ANEMIA TYPE: ICD-10-CM

## 2020-04-20 DIAGNOSIS — D47.1 MYELOPROLIFERATIVE DISORDER (HCC): ICD-10-CM

## 2020-04-20 DIAGNOSIS — M48.05 SPINAL STENOSIS OF THORACOLUMBAR REGION: ICD-10-CM

## 2020-04-20 DIAGNOSIS — D75.839 THROMBOCYTOSIS: Primary | ICD-10-CM

## 2020-04-20 DIAGNOSIS — I10 ESSENTIAL HYPERTENSION: ICD-10-CM

## 2020-04-20 PROBLEM — M48.00 SPINAL STENOSIS: Status: ACTIVE | Noted: 2020-04-20

## 2020-04-20 PROCEDURE — 99443 PR PHYS/QHP TELEPHONE EVALUATION 21-30 MIN: CPT | Performed by: FAMILY MEDICINE

## 2020-06-17 ENCOUNTER — TRANSCRIBE ORDERS (OUTPATIENT)
Dept: LAB | Facility: CLINIC | Age: 70
End: 2020-06-17

## 2020-06-17 ENCOUNTER — APPOINTMENT (OUTPATIENT)
Dept: LAB | Facility: CLINIC | Age: 70
End: 2020-06-17
Payer: MEDICARE

## 2020-06-17 DIAGNOSIS — M35.3 POLYMYALGIA RHEUMATICA (HCC): Primary | ICD-10-CM

## 2020-06-17 DIAGNOSIS — M35.3 POLYMYALGIA RHEUMATICA (HCC): ICD-10-CM

## 2020-06-17 LAB
25(OH)D3 SERPL-MCNC: 34.8 NG/ML (ref 30–100)
BASOPHILS # BLD AUTO: 0.04 THOUSANDS/ΜL (ref 0–0.1)
BASOPHILS NFR BLD AUTO: 1 % (ref 0–1)
CREAT SERPL-MCNC: 0.84 MG/DL (ref 0.6–1.3)
CRP SERPL QL: <3 MG/L
EOSINOPHIL # BLD AUTO: 0.26 THOUSAND/ΜL (ref 0–0.61)
EOSINOPHIL NFR BLD AUTO: 5 % (ref 0–6)
ERYTHROCYTE [DISTWIDTH] IN BLOOD BY AUTOMATED COUNT: 12.9 % (ref 11.6–15.1)
ERYTHROCYTE [SEDIMENTATION RATE] IN BLOOD: 15 MM/HOUR (ref 0–20)
GFR SERPL CREATININE-BSD FRML MDRD: 72 ML/MIN/1.73SQ M
HCT VFR BLD AUTO: 41.9 % (ref 34.8–46.1)
HGB BLD-MCNC: 13.2 G/DL (ref 11.5–15.4)
IMM GRANULOCYTES # BLD AUTO: 0.01 THOUSAND/UL (ref 0–0.2)
IMM GRANULOCYTES NFR BLD AUTO: 0 % (ref 0–2)
LYMPHOCYTES # BLD AUTO: 1.22 THOUSANDS/ΜL (ref 0.6–4.47)
LYMPHOCYTES NFR BLD AUTO: 23 % (ref 14–44)
MCH RBC QN AUTO: 30.6 PG (ref 26.8–34.3)
MCHC RBC AUTO-ENTMCNC: 31.5 G/DL (ref 31.4–37.4)
MCV RBC AUTO: 97 FL (ref 82–98)
MONOCYTES # BLD AUTO: 0.42 THOUSAND/ΜL (ref 0.17–1.22)
MONOCYTES NFR BLD AUTO: 8 % (ref 4–12)
NEUTROPHILS # BLD AUTO: 3.28 THOUSANDS/ΜL (ref 1.85–7.62)
NEUTS SEG NFR BLD AUTO: 63 % (ref 43–75)
NRBC BLD AUTO-RTO: 0 /100 WBCS
PLATELET # BLD AUTO: 458 THOUSANDS/UL (ref 149–390)
PMV BLD AUTO: 8.9 FL (ref 8.9–12.7)
PTH-INTACT SERPL-MCNC: 55.9 PG/ML (ref 18.4–80.1)
RBC # BLD AUTO: 4.31 MILLION/UL (ref 3.81–5.12)
WBC # BLD AUTO: 5.23 THOUSAND/UL (ref 4.31–10.16)

## 2020-06-17 PROCEDURE — 85652 RBC SED RATE AUTOMATED: CPT

## 2020-06-17 PROCEDURE — 82565 ASSAY OF CREATININE: CPT

## 2020-06-17 PROCEDURE — 83970 ASSAY OF PARATHORMONE: CPT

## 2020-06-17 PROCEDURE — 86140 C-REACTIVE PROTEIN: CPT

## 2020-06-17 PROCEDURE — 36415 COLL VENOUS BLD VENIPUNCTURE: CPT

## 2020-06-17 PROCEDURE — 82306 VITAMIN D 25 HYDROXY: CPT

## 2020-06-17 PROCEDURE — 85025 COMPLETE CBC W/AUTO DIFF WBC: CPT

## 2020-07-24 DIAGNOSIS — K58.0 IRRITABLE BOWEL SYNDROME WITH DIARRHEA: Primary | ICD-10-CM

## 2020-07-24 RX ORDER — DIPHENOXYLATE HYDROCHLORIDE AND ATROPINE SULFATE 2.5; .025 MG/1; MG/1
TABLET ORAL
Qty: 60 TABLET | Refills: 2 | Status: SHIPPED | OUTPATIENT
Start: 2020-07-24 | End: 2021-03-15 | Stop reason: SDUPTHER

## 2020-07-27 ENCOUNTER — TELEPHONE (OUTPATIENT)
Dept: HEMATOLOGY ONCOLOGY | Facility: CLINIC | Age: 70
End: 2020-07-27

## 2020-07-27 NOTE — TELEPHONE ENCOUNTER
LV for patient informing her I had to r/s her appointment from 8/14/20 to 10/2/20 at 10:20 AM with Dr Luz Milan  Instructed patient to call back if she has any questions or concerns

## 2020-07-30 ENCOUNTER — OFFICE VISIT (OUTPATIENT)
Dept: FAMILY MEDICINE CLINIC | Facility: CLINIC | Age: 70
End: 2020-07-30
Payer: MEDICARE

## 2020-07-30 VITALS
SYSTOLIC BLOOD PRESSURE: 116 MMHG | TEMPERATURE: 97.2 F | HEIGHT: 57 IN | OXYGEN SATURATION: 95 % | HEART RATE: 74 BPM | DIASTOLIC BLOOD PRESSURE: 72 MMHG | RESPIRATION RATE: 16 BRPM | BODY MASS INDEX: 27.42 KG/M2 | WEIGHT: 127.1 LBS

## 2020-07-30 DIAGNOSIS — I10 ESSENTIAL HYPERTENSION: ICD-10-CM

## 2020-07-30 DIAGNOSIS — M35.3 POLYMYALGIA RHEUMATICA (HCC): ICD-10-CM

## 2020-07-30 DIAGNOSIS — M81.0 AGE-RELATED OSTEOPOROSIS WITHOUT CURRENT PATHOLOGICAL FRACTURE: Primary | ICD-10-CM

## 2020-07-30 DIAGNOSIS — D75.839 THROMBOCYTOSIS: ICD-10-CM

## 2020-07-30 PROCEDURE — 3008F BODY MASS INDEX DOCD: CPT | Performed by: FAMILY MEDICINE

## 2020-07-30 PROCEDURE — 1160F RVW MEDS BY RX/DR IN RCRD: CPT | Performed by: FAMILY MEDICINE

## 2020-07-30 PROCEDURE — 3074F SYST BP LT 130 MM HG: CPT | Performed by: FAMILY MEDICINE

## 2020-07-30 PROCEDURE — 1036F TOBACCO NON-USER: CPT | Performed by: FAMILY MEDICINE

## 2020-07-30 PROCEDURE — 3078F DIAST BP <80 MM HG: CPT | Performed by: FAMILY MEDICINE

## 2020-07-30 PROCEDURE — 99214 OFFICE O/P EST MOD 30 MIN: CPT | Performed by: FAMILY MEDICINE

## 2020-07-30 NOTE — PROGRESS NOTES
Assessment/Plan:  F/u and eval HTN and "sl cough",  Wt issues, and off all narcotics  Also, OPP and f/u; Prolia, and PMR, and thrombocytosis  Seeing dr El sr the latter  Has been seeing him for 2 yrs, but only saw HIM twice  Problem List Items Addressed This Visit        Cardiovascular and Mediastinum    Hypertension       Hematopoietic and Hemostatic    Thrombocytosis ([facility] [address] )       Other    Polymyalgia rheumatica ([facility] [address] )      Other Visit Diagnoses     Age-related osteoporosis without current pathological fracture    -  Primary    Last dose of Prolia today, after 7 yrs  Will get DEXA in 6 mo and then decide if further Rx indicated  Relevant Medications    denosumab (PROLIA) subcutaneous injection 60 mg (Completed)            Subjective:      Patient ID: Nicky Mata is a 78 y o  female  HPI     The following portions of the patient's history were reviewed and updated as appropriate:   She has a past medical history of Arthritis, Hernia, umbilical, Hypertension, and Spinal stenosis  ,  does not have any pertinent problems on file  ,   has a past surgical history that includes Hysterectomy; Foot surgery; Hernia repair; Tonsillectomy; and Mammo (historical) (2017)  ,  family history includes Cancer in her father; Hypertension in her mother  ,   reports that she has never smoked  She has never used smokeless tobacco  She reports that she does not drink alcohol or use drugs  ,  is allergic to aspirin and ibuprofen     Current Outpatient Medications   Medication Sig Dispense Refill    baclofen 20 mg tablet TAKE 1 TABLET BY MOUTH FOUR TIMES DAILY 120 tablet 6    Calcium Carbonate-Vitamin D 600-200 MG-UNIT TABS Take by mouth      celecoxib (CELEBREX) 200 mg capsule Take by mouth      Cholecalciferol (VITAMIN D PO) Take 5,000 Units by mouth      denosumab (PROLIA) 60 mg/mL Inject under the skin      esomeprazole (NEXIUM) 40 MG capsule Take by mouth      levothyroxine 50 mcg tablet TAKE 1 TABLET BY MOUTH EVERY DAY AS DIRECTED 30 tablet 6    Multiple Vitamins-Minerals (CENTRUM SILVER 50+WOMEN) TABS Take by mouth      potassium chloride (MICRO-K) 10 MEQ CR capsule TAKE 2 CAPSULES BY MOUTH TWICE DAILY AS DIRECTED 120 capsule 6    telmisartan-hydrochlorothiazide (MICARDIS HCT) 80-12 5 MG per tablet TAKE 1 TABLET BY MOUTH EVERY DAY 30 tablet 6    zolpidem (AMBIEN) 5 mg tablet Take by mouth      diphenoxylate-atropine (LOMOTIL) 2 5-0 025 mg per tablet TAKE 1 TABLET BY MOUTH TWICE DAILY (Patient not taking: Reported on 7/30/2020) 60 tablet 2    oxyCODONE-acetaminophen (PERCOCET) 7 5-325 MG per tablet 1 tablet 3 times a day  0    predniSONE 5 mg tablet   0    rosuvastatin (CRESTOR) 20 MG tablet TAKE 1 TABLET BY MOUTH EVERY DAY AS DIRECTED (Patient not taking: Reported on 7/30/2020) 30 tablet 6     No current facility-administered medications for this visit  Review of Systems   Constitutional: Negative for activity change, appetite change, chills, diaphoresis, fatigue and fever  HENT: Negative for congestion, ear discharge, ear pain, facial swelling, nosebleeds, sore throat, tinnitus, trouble swallowing and voice change  Eyes: Negative for photophobia, pain, discharge, redness, itching and visual disturbance  Respiratory: Negative for apnea, cough, choking, chest tightness and shortness of breath  Cardiovascular: Negative for chest pain, palpitations and leg swelling  Gastrointestinal: Negative for abdominal distention, abdominal pain, blood in stool, constipation, diarrhea, nausea and vomiting  Endocrine: Negative for cold intolerance, heat intolerance, polydipsia, polyphagia and polyuria  Genitourinary: Negative for decreased urine volume, difficulty urinating, dysuria, enuresis, frequency, hematuria, pelvic pain, urgency and vaginal bleeding  Musculoskeletal: Negative for arthralgias, back pain, gait problem, joint swelling, neck pain and neck stiffness     Skin: Negative for color change, pallor and rash  Allergic/Immunologic: Negative for immunocompromised state  Neurological: Negative for dizziness, seizures, facial asymmetry, light-headedness, numbness and headaches  Hematological: Negative for adenopathy  Psychiatric/Behavioral: Negative for agitation, behavioral problems, confusion, decreased concentration, dysphoric mood and hallucinations  Objective:  Vitals:    07/30/20 0948   BP: 116/72   BP Location: Left arm   Patient Position: Sitting   Cuff Size: Adult   Pulse: 74   Resp: 16   Temp: (!) 97 2 °F (36 2 °C)   TempSrc: Temporal   SpO2: 95%   Weight: 57 7 kg (127 lb 1 6 oz)   Height: 4' 9" (1 448 m)     Body mass index is 27 5 kg/m²  Physical Exam   Constitutional: She is oriented to person, place, and time  She appears well-developed and well-nourished  No distress  HENT:   Head: Normocephalic and atraumatic  Nose: Nose normal    Eyes: Pupils are equal, round, and reactive to light  Conjunctivae and EOM are normal    Neck: Normal range of motion  Neck supple  No tracheal deviation present  No thyromegaly present  Cardiovascular: Normal rate, regular rhythm and normal heart sounds  Pulmonary/Chest: Breath sounds normal  No respiratory distress  She has no wheezes  She has no rales  She exhibits no tenderness  Abdominal: Soft  Bowel sounds are normal  She exhibits no distension and no mass  There is no tenderness  There is no rebound and no guarding  Musculoskeletal: Normal range of motion  She exhibits no edema, tenderness or deformity  Neurological: She is alert and oriented to person, place, and time  No cranial nerve deficit  Skin: Skin is warm and dry  No rash noted  She is not diaphoretic  No erythema  No pallor  Psychiatric: She has a normal mood and affect  Her behavior is normal  Judgment and thought content normal    Nursing note and vitals reviewed        I have spent 30 minutes with Patient and family today in which greater than 50% of this time was spent in counseling/coordination of care regarding Diagnostic results, Risks and benefits of tx options, Patient and family education and Risk factor reductions

## 2020-07-30 NOTE — PATIENT INSTRUCTIONS
Connective Tissue Disorders   WHAT YOU NEED TO KNOW:   A connective tissue disorder can affect any connective tissue in your body  Connective tissues support your organs, attach muscles to bones, and create scar tissue after an injury  Cartilage is an example of connective tissue  There are many types of connective tissue disorders, such as rheumatoid arthritis, lupus, and scleroderma  The most common affected areas are joints, muscles, and skin  Your organs, eyes, nervous system, and blood vessels can also be affected  DISCHARGE INSTRUCTIONS:   Call 911 for any of the following:   · You are sweating, and your lips are pale or blue  · You have trouble breathing, chest pain or pressure, or a fast heartbeat  · You are vomiting blood  · You have a high fever  Return to the emergency department if:   · You lose feeling in your hands or feet  · You lose feeling on one side of your body  · You have sudden pain in your eyes and vision problems  Contact your healthcare provider if:   · You have trouble urinating, or you urinate less than usual     · You have trouble having a bowel movement, or you lose control of your bowel movements  · Your muscle or joint tightness worsens, or your fingers begin to curl  · Your symptoms get worse, even after treatment  · Your skin is itchy, swollen, or has a rash  · You have questions or concerns about your condition or care  Medicines:   · Medicines  may be given to prevent your immune system from attacking healthy cells  You may also need medicines to stop the disease from getting worse  You may need to use topical creams or lotions to control a rash or other symptoms that affect your skin  · Prescription pain medicine  may be given  Ask your healthcare provider how to take this medicine safely  Some prescription pain medicines contain acetaminophen  Do not take other medicines that contain acetaminophen without talking to your healthcare provider  Too much acetaminophen may cause liver damage  Prescription pain medicine may cause constipation  Ask your healthcare provider how to prevent or treat constipation  · NSAIDs , such as ibuprofen, help decrease swelling, pain, and fever  This medicine is available with or without a doctor's order  NSAIDs can cause stomach bleeding or kidney problems in certain people  If you take blood thinner medicine, always ask your healthcare provider if NSAIDs are safe for you  Always read the medicine label and follow directions  · Acetaminophen  reduces pain and fever  Acetaminophen is available without a doctor's order  Ask how much to take and how often to take it  Follow directions  Acetaminophen can cause liver damage if not taken correctly  · Steroids  reduce inflammation and pain  · Take your medicine as directed  Contact your healthcare provider if you think your medicine is not helping or if you have side effects  Tell him of her if you are allergic to any medicine  Keep a list of the medicines, vitamins, and herbs you take  Include the amounts, and when and why you take them  Bring the list or the pill bottles to follow-up visits  Carry your medicine list with you in case of an emergency  Manage your connective tissue disorder:   · Rest as needed  Talk to your healthcare provider if you are having trouble sleeping because of pain or other symptoms  Rest your joints if they are stiff or painful  Your healthcare provider may suggest support devices such as crutches or splints to help your joints rest      · Eat a variety of healthy foods  Healthy foods include fruits, vegetables, lean meats, fish, and low-fat dairy products  Work with your healthcare provider or dietitian to create healthy meal plans  · Go to physical or occupational therapy as directed  A physical therapist can help you create an exercise plan  Exercise may help increase your energy   Exercise can also help keep stiff joints flexible and increase range of motion  An occupational therapist can help you learn to do your daily activities when you have pain or swelling  · Talk to your healthcare provider about pregnancy  If you are a woman and want to get pregnant, talk to your healthcare provider  You or your baby might be at risk for complications  You may need to wait until your disease is controlled or your medications are finished before you get pregnant  You may also have trouble getting pregnant because of your disease  Your healthcare provider may be able to suggest ways to improve your ability to become pregnant  · Do not smoke  Nicotine and other chemicals in cigarettes and cigars can cause blood vessel and lung damage  Ask your healthcare provider for information if you currently smoke and need help to quit  E-cigarettes or smokeless tobacco still contain nicotine  Talk to your healthcare provider before you use these products  · Manage stress  Stress may slow healing and lead to illness  Learn ways to control stress, such as relaxation, deep breathing, or listening to music  Manage flares:  A flare means something triggered your symptoms  Stress, cold weather, and sunlight are examples of triggers  Your healthcare provider can help you create a management plan that includes what to do if you have a flare  Treat flares quickly to help prevent serious illness  · Apply ice or heat as directed  Ice helps reduce pain and swelling, and may help prevent tissue damage  Use a cold compress, or put crushed ice in a bag  Cover it with a towel and apply to the painful area for 15 to 20 minutes every hour, or as directed  Heat helps reduce pain and muscle spasms  Apply a warm compress to the area for 20 minutes every 2 hours, or as directed  · Elevate the area above the level of your heart  Elevation can help reduce swelling and pain, especially in your joints  Elevate the area as often as possible      · Keep your hands and feet warm  Certain connective tissue disorders can cause your hands and feet to become cold and painful  Over time, ulcers or gangrene (tissue death) may develop if frequent or severe attacks are not prevented  Dress warmly in cold weather, including gloves and thick socks  It may help to wiggle your fingers or toes to improve circulation  Follow up with your healthcare provider as directed: You may need ongoing tests or treatment  Write down your questions so you remember to ask them during your visits  © 2017 [facility] Information is for End User's use only and may not be sold, redistributed or otherwise used for commercial purposes  All illustrations and images included in [employer]® are the copyrighted property of [employer]  or [employer]  The above information is an  only  It is not intended as medical advice for individual conditions or treatments  Talk to your doctor, nurse or pharmacist before following any medical regimen to see if it is safe and effective for you

## 2020-09-23 ENCOUNTER — TRANSCRIBE ORDERS (OUTPATIENT)
Dept: LAB | Facility: CLINIC | Age: 70
End: 2020-09-23

## 2020-09-23 ENCOUNTER — APPOINTMENT (OUTPATIENT)
Dept: LAB | Facility: CLINIC | Age: 70
End: 2020-09-23
Payer: MEDICARE

## 2020-09-23 DIAGNOSIS — R70.0 ELEVATED SEDIMENTATION RATE: Primary | ICD-10-CM

## 2020-09-23 DIAGNOSIS — R70.0 ELEVATED SEDIMENTATION RATE: ICD-10-CM

## 2020-09-23 DIAGNOSIS — D50.9 IRON DEFICIENCY ANEMIA, UNSPECIFIED IRON DEFICIENCY ANEMIA TYPE: ICD-10-CM

## 2020-09-23 LAB
BASOPHILS # BLD AUTO: 0.04 THOUSANDS/ΜL (ref 0–0.1)
BASOPHILS NFR BLD AUTO: 1 % (ref 0–1)
CALCIUM SERPL-MCNC: 9.4 MG/DL (ref 8.3–10.1)
EOSINOPHIL # BLD AUTO: 0.33 THOUSAND/ΜL (ref 0–0.61)
EOSINOPHIL NFR BLD AUTO: 7 % (ref 0–6)
ERYTHROCYTE [DISTWIDTH] IN BLOOD BY AUTOMATED COUNT: 13 % (ref 11.6–15.1)
FERRITIN SERPL-MCNC: 80 NG/ML (ref 8–388)
HCT VFR BLD AUTO: 41.4 % (ref 34.8–46.1)
HGB BLD-MCNC: 13 G/DL (ref 11.5–15.4)
IMM GRANULOCYTES # BLD AUTO: 0.01 THOUSAND/UL (ref 0–0.2)
IMM GRANULOCYTES NFR BLD AUTO: 0 % (ref 0–2)
IRON SATN MFR SERPL: 14 %
IRON SERPL-MCNC: 44 UG/DL (ref 50–170)
LYMPHOCYTES # BLD AUTO: 1.02 THOUSANDS/ΜL (ref 0.6–4.47)
LYMPHOCYTES NFR BLD AUTO: 22 % (ref 14–44)
MCH RBC QN AUTO: 30.2 PG (ref 26.8–34.3)
MCHC RBC AUTO-ENTMCNC: 31.4 G/DL (ref 31.4–37.4)
MCV RBC AUTO: 96 FL (ref 82–98)
MONOCYTES # BLD AUTO: 0.47 THOUSAND/ΜL (ref 0.17–1.22)
MONOCYTES NFR BLD AUTO: 10 % (ref 4–12)
NEUTROPHILS # BLD AUTO: 2.85 THOUSANDS/ΜL (ref 1.85–7.62)
NEUTS SEG NFR BLD AUTO: 60 % (ref 43–75)
NRBC BLD AUTO-RTO: 0 /100 WBCS
PLATELET # BLD AUTO: 452 THOUSANDS/UL (ref 149–390)
PMV BLD AUTO: 8.5 FL (ref 8.9–12.7)
RBC # BLD AUTO: 4.31 MILLION/UL (ref 3.81–5.12)
TIBC SERPL-MCNC: 319 UG/DL (ref 250–450)
WBC # BLD AUTO: 4.72 THOUSAND/UL (ref 4.31–10.16)

## 2020-09-23 PROCEDURE — 82310 ASSAY OF CALCIUM: CPT

## 2020-09-23 PROCEDURE — 85025 COMPLETE CBC W/AUTO DIFF WBC: CPT

## 2020-09-23 PROCEDURE — 36415 COLL VENOUS BLD VENIPUNCTURE: CPT

## 2020-09-23 PROCEDURE — 83550 IRON BINDING TEST: CPT

## 2020-09-23 PROCEDURE — 82728 ASSAY OF FERRITIN: CPT

## 2020-09-23 PROCEDURE — 83540 ASSAY OF IRON: CPT

## 2020-10-02 ENCOUNTER — OFFICE VISIT (OUTPATIENT)
Dept: HEMATOLOGY ONCOLOGY | Facility: CLINIC | Age: 70
End: 2020-10-02
Payer: MEDICARE

## 2020-10-02 VITALS
SYSTOLIC BLOOD PRESSURE: 118 MMHG | TEMPERATURE: 97.2 F | OXYGEN SATURATION: 98 % | RESPIRATION RATE: 16 BRPM | HEART RATE: 58 BPM | DIASTOLIC BLOOD PRESSURE: 68 MMHG

## 2020-10-02 DIAGNOSIS — D50.9 IRON DEFICIENCY ANEMIA, UNSPECIFIED IRON DEFICIENCY ANEMIA TYPE: ICD-10-CM

## 2020-10-02 DIAGNOSIS — D75.839 THROMBOCYTOSIS: Primary | ICD-10-CM

## 2020-10-02 PROCEDURE — 99213 OFFICE O/P EST LOW 20 MIN: CPT | Performed by: INTERNAL MEDICINE

## 2020-11-02 ENCOUNTER — OFFICE VISIT (OUTPATIENT)
Dept: FAMILY MEDICINE CLINIC | Facility: CLINIC | Age: 70
End: 2020-11-02
Payer: MEDICARE

## 2020-11-02 VITALS
RESPIRATION RATE: 18 BRPM | DIASTOLIC BLOOD PRESSURE: 68 MMHG | WEIGHT: 126 LBS | BODY MASS INDEX: 27.18 KG/M2 | HEART RATE: 74 BPM | TEMPERATURE: 97.2 F | OXYGEN SATURATION: 97 % | SYSTOLIC BLOOD PRESSURE: 118 MMHG | HEIGHT: 57 IN

## 2020-11-02 DIAGNOSIS — Z23 INFLUENZA VACCINE NEEDED: Primary | ICD-10-CM

## 2020-11-02 PROCEDURE — G0008 ADMIN INFLUENZA VIRUS VAC: HCPCS

## 2020-11-02 PROCEDURE — 90662 IIV NO PRSV INCREASED AG IM: CPT

## 2020-11-02 PROCEDURE — 99214 OFFICE O/P EST MOD 30 MIN: CPT | Performed by: FAMILY MEDICINE

## 2020-11-08 DIAGNOSIS — E87.6 HYPOPOTASSEMIA: ICD-10-CM

## 2020-11-08 DIAGNOSIS — E03.9 HYPOTHYROIDISM, UNSPECIFIED TYPE: ICD-10-CM

## 2020-11-08 DIAGNOSIS — E78.2 MIXED HYPERLIPIDEMIA: ICD-10-CM

## 2020-11-08 DIAGNOSIS — M62.838 MUSCLE SPASM OF BOTH LOWER LEGS: ICD-10-CM

## 2020-11-08 RX ORDER — POTASSIUM CHLORIDE 750 MG/1
CAPSULE, EXTENDED RELEASE ORAL
Qty: 120 CAPSULE | Refills: 6 | Status: SHIPPED | OUTPATIENT
Start: 2020-11-08 | End: 2020-11-10

## 2020-11-08 RX ORDER — LEVOTHYROXINE SODIUM 0.05 MG/1
TABLET ORAL
Qty: 30 TABLET | Refills: 6 | Status: SHIPPED | OUTPATIENT
Start: 2020-11-08 | End: 2020-11-10

## 2020-11-08 RX ORDER — BACLOFEN 20 MG/1
TABLET ORAL
Qty: 120 TABLET | Refills: 6 | Status: SHIPPED | OUTPATIENT
Start: 2020-11-08 | End: 2021-06-05

## 2020-11-08 RX ORDER — ROSUVASTATIN CALCIUM 20 MG/1
TABLET, COATED ORAL
Qty: 30 TABLET | Refills: 6 | Status: SHIPPED | OUTPATIENT
Start: 2020-11-08 | End: 2020-11-10

## 2020-11-09 DIAGNOSIS — E03.9 HYPOTHYROIDISM, UNSPECIFIED TYPE: ICD-10-CM

## 2020-11-09 DIAGNOSIS — E87.6 HYPOPOTASSEMIA: ICD-10-CM

## 2020-11-09 DIAGNOSIS — E78.2 MIXED HYPERLIPIDEMIA: ICD-10-CM

## 2020-11-10 RX ORDER — LEVOTHYROXINE SODIUM 0.05 MG/1
TABLET ORAL
Qty: 90 TABLET | Refills: 3 | Status: SHIPPED | OUTPATIENT
Start: 2020-11-10 | End: 2021-10-29

## 2020-11-10 RX ORDER — ROSUVASTATIN CALCIUM 20 MG/1
TABLET, COATED ORAL
Qty: 90 TABLET | Refills: 3 | Status: SHIPPED | OUTPATIENT
Start: 2020-11-10 | End: 2021-11-04

## 2020-11-10 RX ORDER — POTASSIUM CHLORIDE 750 MG/1
CAPSULE, EXTENDED RELEASE ORAL
Qty: 360 CAPSULE | Refills: 3 | Status: SHIPPED | OUTPATIENT
Start: 2020-11-10 | End: 2021-10-29

## 2020-12-18 DIAGNOSIS — I10 HTN (HYPERTENSION), BENIGN: ICD-10-CM

## 2020-12-20 DIAGNOSIS — I10 HTN (HYPERTENSION), BENIGN: ICD-10-CM

## 2020-12-20 RX ORDER — TELMISARTAN AND HYDROCHLORTHIAZIDE 80; 12.5 MG/1; MG/1
1 TABLET ORAL DAILY
Qty: 30 TABLET | Refills: 6 | Status: SHIPPED | OUTPATIENT
Start: 2020-12-20 | End: 2020-12-20

## 2020-12-20 RX ORDER — TELMISARTAN AND HYDROCHLORTHIAZIDE 80; 12.5 MG/1; MG/1
1 TABLET ORAL DAILY
Qty: 90 TABLET | Refills: 3 | Status: SHIPPED | OUTPATIENT
Start: 2020-12-20 | End: 2021-06-07 | Stop reason: ALTCHOICE

## 2021-03-10 ENCOUNTER — TELEPHONE (OUTPATIENT)
Dept: HEMATOLOGY ONCOLOGY | Facility: CLINIC | Age: 71
End: 2021-03-10

## 2021-03-10 NOTE — TELEPHONE ENCOUNTER
ROMÁNM informing patient that her appt on 4/2/21 needed to be R/S  Her new appt is 4/2/21 at 8:20 am with Dr Kyle Abraham at the [place] location, patient was instructed to call the office if the appt does not work for her schedule

## 2021-03-15 ENCOUNTER — OFFICE VISIT (OUTPATIENT)
Dept: FAMILY MEDICINE CLINIC | Facility: CLINIC | Age: 71
End: 2021-03-15
Payer: MEDICARE

## 2021-03-15 VITALS
RESPIRATION RATE: 18 BRPM | WEIGHT: 126 LBS | OXYGEN SATURATION: 98 % | TEMPERATURE: 97.7 F | HEART RATE: 80 BPM | SYSTOLIC BLOOD PRESSURE: 122 MMHG | DIASTOLIC BLOOD PRESSURE: 76 MMHG | HEIGHT: 57 IN | BODY MASS INDEX: 27.18 KG/M2

## 2021-03-15 DIAGNOSIS — D47.3 ESSENTIAL (HEMORRHAGIC) THROMBOCYTHEMIA (HCC): ICD-10-CM

## 2021-03-15 DIAGNOSIS — I10 HTN (HYPERTENSION), BENIGN: ICD-10-CM

## 2021-03-15 DIAGNOSIS — K58.0 IRRITABLE BOWEL SYNDROME WITH DIARRHEA: ICD-10-CM

## 2021-03-15 DIAGNOSIS — M19.011 PRIMARY OSTEOARTHRITIS OF RIGHT SHOULDER: ICD-10-CM

## 2021-03-15 DIAGNOSIS — E55.9 VITAMIN D INSUFFICIENCY: ICD-10-CM

## 2021-03-15 DIAGNOSIS — E03.9 HYPOTHYROIDISM, UNSPECIFIED TYPE: ICD-10-CM

## 2021-03-15 DIAGNOSIS — I10 ESSENTIAL HYPERTENSION: Primary | ICD-10-CM

## 2021-03-15 DIAGNOSIS — Z00.00 ENCOUNTER FOR MEDICARE ANNUAL WELLNESS EXAM: ICD-10-CM

## 2021-03-15 DIAGNOSIS — E78.2 MIXED HYPERLIPIDEMIA: ICD-10-CM

## 2021-03-15 DIAGNOSIS — R63.8 INCREASED BMI: ICD-10-CM

## 2021-03-15 DIAGNOSIS — M48.05 SPINAL STENOSIS OF THORACOLUMBAR REGION: ICD-10-CM

## 2021-03-15 DIAGNOSIS — R73.9 ELEVATED BLOOD SUGAR: ICD-10-CM

## 2021-03-15 DIAGNOSIS — M35.3 POLYMYALGIA RHEUMATICA (HCC): ICD-10-CM

## 2021-03-15 PROCEDURE — 99215 OFFICE O/P EST HI 40 MIN: CPT | Performed by: FAMILY MEDICINE

## 2021-03-15 PROCEDURE — G0438 PPPS, INITIAL VISIT: HCPCS | Performed by: FAMILY MEDICINE

## 2021-03-15 PROCEDURE — 1123F ACP DISCUSS/DSCN MKR DOCD: CPT | Performed by: FAMILY MEDICINE

## 2021-03-15 RX ORDER — DIPHENOXYLATE HYDROCHLORIDE AND ATROPINE SULFATE 2.5; .025 MG/1; MG/1
1 TABLET ORAL 2 TIMES DAILY
Qty: 60 TABLET | Refills: 2 | Status: SHIPPED | OUTPATIENT
Start: 2021-03-15 | End: 2021-09-16

## 2021-03-15 NOTE — PATIENT INSTRUCTIONS
Medicare Preventive Visit Patient Instructions  Thank you for completing your Welcome to Medicare Visit or Medicare Annual Wellness Visit today  Your next wellness visit will be due in one year (3/16/2022)  The screening/preventive services that you may require over the next 5-10 years are detailed below  Some tests may not apply to you based off risk factors and/or age  Screening tests ordered at today's visit but not completed yet may show as past due  Also, please note that scanned in results may not display below  Preventive Screenings:  Service Recommendations Previous Testing/Comments   Colorectal Cancer Screening  * Colonoscopy    * Fecal Occult Blood Test (FOBT)/Fecal Immunochemical Test (FIT)  * Fecal DNA/Cologuard Test  * Flexible Sigmoidoscopy Age: 54-65 years old   Colonoscopy: every 10 years (may be performed more frequently if at higher risk)  OR  FOBT/FIT: every 1 year  OR  Cologuard: every 3 years  OR  Sigmoidoscopy: every 5 years  Screening may be recommended earlier than age 48 if at higher risk for colorectal cancer  Also, an individualized decision between you and your healthcare provider will decide whether screening between the ages of 74-80 would be appropriate  Colonoscopy: Not on file  FOBT/FIT: 08/17/2018  Cologuard: Not on file  Sigmoidoscopy: Not on file          Breast Cancer Screening Age: 36 years old  Frequency: every 1-2 years  Not required if history of left and right mastectomy Mammogram: 02/27/2017        Cervical Cancer Screening Between the ages of 21-29, pap smear recommended once every 3 years  Between the ages of 33-67, can perform pap smear with HPV co-testing every 5 years     Recommendations may differ for women with a history of total hysterectomy, cervical cancer, or abnormal pap smears in past  Pap Smear: Not on file    Screening Not Indicated   Hepatitis C Screening Once for adults born between [facility]  More frequently in patients at high risk for Hepatitis C Hep C Antibody: 10/16/2018    Screening Current   Diabetes Screening 1-2 times per year if you're at risk for diabetes or have pre-diabetes Fasting glucose: 93 mg/dL   A1C: No results in last 5 years        Cholesterol Screening Once every 5 years if you don't have a lipid disorder  May order more often based on risk factors  Lipid panel: Not on file    Screening Not Indicated  History Lipid Disorder     Other Preventive Screenings Covered by Medicare:  1  Abdominal Aortic Aneurysm (AAA) Screening: covered once if your at risk  You're considered to be at risk if you have a family history of AAA  2  Lung Cancer Screening: covers low dose CT scan once per year if you meet all of the following conditions: (1) Age 50-69; (2) No signs or symptoms of lung cancer; (3) Current smoker or have quit smoking within the last 15 years; (4) You have a tobacco smoking history of at least 30 pack years (packs per day multiplied by number of years you smoked); (5) You get a written order from a healthcare provider  3  Glaucoma Screening: covered annually if you're considered high risk: (1) You have diabetes OR (2) Family history of glaucoma OR (3)  aged 48 and older OR (3)  American aged 72 and older  3  Osteoporosis Screening: covered every 2 years if you meet one of the following conditions: (1) You're estrogen deficient and at risk for osteoporosis based off medical history and other findings; (2) Have a vertebral abnormality; (3) On glucocorticoid therapy for more than 3 months; (4) Have primary hyperparathyroidism; (5) On osteoporosis medications and need to assess response to drug therapy  · Last bone density test (DXA Scan): Not on file  5  HIV Screening: covered annually if you're between the age of 12-76  Also covered annually if you are younger than 13 and older than 72 with risk factors for HIV infection   For pregnant patients, it is covered up to 3 times per pregnancy  Immunizations:  Immunization Recommendations   Influenza Vaccine Annual influenza vaccination during flu season is recommended for all persons aged >= 6 months who do not have contraindications   Pneumococcal Vaccine (Prevnar and Pneumovax)  * Prevnar = PCV13  * Pneumovax = PPSV23   Adults 25-60 years old: 1-3 doses may be recommended based on certain risk factors  Adults 72 years old: Prevnar (PCV13) vaccine recommended followed by Pneumovax (PPSV23) vaccine  If already received PPSV23 since turning 65, then PCV13 recommended at least one year after PPSV23 dose  Hepatitis B Vaccine 3 dose series if at intermediate or high risk (ex: diabetes, end stage renal disease, liver disease)   Tetanus (Td) Vaccine - COST NOT COVERED BY MEDICARE PART B Following completion of primary series, a booster dose should be given every 10 years to maintain immunity against tetanus  Td may also be given as tetanus wound prophylaxis  Tdap Vaccine - COST NOT COVERED BY MEDICARE PART B Recommended at least once for all adults  For pregnant patients, recommended with each pregnancy  Shingles Vaccine (Shingrix) - COST NOT COVERED BY MEDICARE PART B  2 shot series recommended in those aged 48 and above     Health Maintenance Due:      Topic Date Due    MAMMOGRAM  02/27/2018    Colorectal Cancer Screening  08/17/2019    Hepatitis C Screening  Completed     Immunizations Due:      Topic Date Due    COVID-19 Vaccine (1 of 2) 02[birth year]/1968    DTaP,Tdap,and Td Vaccines (1 - Tdap) 02/17/1973    Pneumococcal Vaccine: 65+ Years (1 of 1 - PPSV23) 02/17/2017     Advance Directives   What are advance directives? Advance directives are legal documents that state your wishes and plans for medical care  These plans are made ahead of time in case you lose your ability to make decisions for yourself  Advance directives can apply to any medical decision, such as the treatments you want, and if you want to donate organs     What are the types of advance directives? There are many types of advance directives, and each state has rules about how to use them  You may choose a combination of any of the following:  · Living will: This is a written record of the treatment you want  You can also choose which treatments you do not want, which to limit, and which to stop at a certain time  This includes surgery, medicine, IV fluid, and tube feedings  · Durable power of  for healthcare [facility]): This is a written record that states who you want to make healthcare choices for you when you are unable to make them for yourself  This person, called a proxy, is usually a family member or a friend  You may choose more than 1 proxy  · Do not resuscitate (DNR) order:  A DNR order is used in case your heart stops beating or you stop breathing  It is a request not to have certain forms of treatment, such as CPR  A DNR order may be included in other types of advance directives  · Medical directive: This covers the care that you want if you are in a coma, near death, or unable to make decisions for yourself  You can list the treatments you want for each condition  Treatment may include pain medicine, surgery, blood transfusions, dialysis, IV or tube feedings, and a ventilator (breathing machine)  · Values history: This document has questions about your views, beliefs, and how you feel and think about life  This information can help others choose the care that you would choose  Why are advance directives important? An advance directive helps you control your care  Although spoken wishes may be used, it is better to have your wishes written down  Spoken wishes can be misunderstood, or not followed  Treatments may be given even if you do not want them  An advance directive may make it easier for your family to make difficult choices about your care     Weight Management   Why it is important to manage your weight:  Being overweight increases your risk of health conditions such as heart disease, high blood pressure, type 2 diabetes, and certain types of cancer  It can also increase your risk for osteoarthritis, sleep apnea, and other respiratory problems  Aim for a slow, steady weight loss  Even a small amount of weight loss can lower your risk of health problems  How to lose weight safely:  A safe and healthy way to lose weight is to eat fewer calories and get regular exercise  You can lose up about 1 pound a week by decreasing the number of calories you eat by 500 calories each day  Healthy meal plan for weight management:  A healthy meal plan includes a variety of foods, contains fewer calories, and helps you stay healthy  A healthy meal plan includes the following:  · Eat whole-grain foods more often  A healthy meal plan should contain fiber  Fiber is the part of grains, fruits, and vegetables that is not broken down by your body  Whole-grain foods are healthy and provide extra fiber in your diet  Some examples of whole-grain foods are whole-wheat breads and pastas, oatmeal, brown rice, and bulgur  · Eat a variety of vegetables every day  Include dark, leafy greens such as spinach, kale, juan antonio greens, and mustard greens  Eat yellow and orange vegetables such as carrots, sweet potatoes, and winter squash  · Eat a variety of fruits every day  Choose fresh or canned fruit (canned in its own juice or light syrup) instead of juice  Fruit juice has very little or no fiber  · Eat low-fat dairy foods  Drink fat-free (skim) milk or 1% milk  Eat fat-free yogurt and low-fat cottage cheese  Try low-fat cheeses such as mozzarella and other reduced-fat cheeses  · Choose meat and other protein foods that are low in fat  Choose beans or other legumes such as split peas or lentils  Choose fish, skinless poultry (chicken or turkey), or lean cuts of red meat (beef or pork)  Before you cook meat or poultry, cut off any visible fat  · Use less fat and oil    Try baking foods instead of frying them  Add less fat, such as margarine, sour cream, regular salad dressing and mayonnaise to foods  Eat fewer high-fat foods  Some examples of high-fat foods include french fries, doughnuts, ice cream, and cakes  · Eat fewer sweets  Limit foods and drinks that are high in sugar  This includes candy, cookies, regular soda, and sweetened drinks  Exercise:  Exercise at least 30 minutes per day on most days of the week  Some examples of exercise include walking, biking, dancing, and swimming  You can also fit in more physical activity by taking the stairs instead of the elevator or parking farther away from stores  Ask your healthcare provider about the best exercise plan for you  Narcotic (Opioid) Safety    Use narcotics safely:  · Take prescribed narcotics exactly as directed  · Do not give narcotics to others or take narcotics that belong to someone else  · Do not mix narcotics without medicines or alcohol  · Do not drive or operate heavy machinery after you take the narcotic  · Monitor for side effects and notify your healthcare provider if you experienced side effects such as nausea, sleepiness, itching, or trouble thinking clearly  Manage constipation:    Constipation is the most common side effect of narcotic medicine  Constipation is when you have hard, dry bowel movements, or you go longer than usual between bowel movements  Tell your healthcare provider about all changes in your bowel movements while you are taking narcotics  He or she may recommend laxative medicine to help you have a bowel movement  He or she may also change the kind of narcotic you are taking, or change when you take it  The following are more ways you can prevent or relieve constipation:    · Drink liquids as directed  You may need to drink extra liquids to help soften and move your bowels  Ask how much liquid to drink each day and which liquids are best for you  · Eat high-fiber foods    This may help decrease constipation by adding bulk to your bowel movements  High-fiber foods include fruits, vegetables, whole-grain breads and cereals, and beans  Your healthcare provider or dietitian can help you create a high-fiber meal plan  Your provider may also recommend a fiber supplement if you cannot get enough fiber from food  · Exercise regularly  Regular physical activity can help stimulate your intestines  Walking is a good exercise to prevent or relieve constipation  Ask which exercises are best for you  · Schedule a time each day to have a bowel movement  This may help train your body to have regular bowel movements  Bend forward while you are on the toilet to help move the bowel movement out  Sit on the toilet for at least 10 minutes, even if you do not have a bowel movement  Store narcotics safely:   · Store narcotics where others cannot easily get them  Keep them in a locked cabinet or secure area  Do not  keep them in a purse or other bag you carry with you  A person may be looking for something else and find the narcotics  · Make sure narcotics are stored out of the reach of children  A child can easily overdose on narcotics  Narcotics may look like candy to a small child  The best way to dispose of narcotics: The laws vary by country and area  In the [place], the best way is to return the narcotics through a take-back program  This program is offered by the [employer] LOYD)  The following are options for using the program:  · Take the narcotics to a [place] collection site  The site is often a law enforcement center  Call your local law enforcement center for scheduled take-back days in your area  You will be given information on where to go if the collection site is in a different location  · Take the narcotics to an approved pharmacy or hospital   A pharmacy or hospital may be set up as a collection site   You will need to ask if it is a [place] collection site if you were not directed there  A pharmacy or doctor's office may not be able to take back narcotics unless it is a [place] site  · Use a mail-back system  This means you are given containers to put the narcotics into  You will then mail them in the containers  · Use a take-back drop box  This is a place to leave the narcotics at any time  People and animals will not be able to get into the box  Your local law enforcement agency can tell you where to find a drop box in your area  Other ways to manage pain:   · Ask your healthcare provider about non-narcotic medicines to control pain  Nonprescription medicines include NSAIDs (such as ibuprofen) and acetaminophen  Prescription medicines include muscle relaxers, antidepressants, and steroids  · Pain may be managed without any medicines  Some ways to relieve pain include massage, aromatherapy, or meditation  Physical or occupational therapy may also help  For more information:   · Drug Enforcement Administration  [address] [place] [address] [address]  Phone: 4- 464 - [phone]  Web Address: [email or web address]_[email or web address]    · [address]  and Drug Administration  [facility] , [address] [place] [address]  Phone: 0- 352 - [phone]  Web Address: http://INF[employer]/     © Copyright [employer] 2018 Information is for End User's use only and may not be sold, redistributed or otherwise used for commercial purposes  All illustrations and images included in CareNotes® are the copyrighted property of [employer]  or [address] [place] [address]  Hyperlipidemia   AMBULATORY CARE:   Hyperlipidemia  is a high level of lipids (fats) in your blood  These lipids include cholesterol or triglycerides  Lipids are made by your body  They also come from the foods you eat  Your body needs lipids to work properly, but high levels increase your risk for heart disease, heart attack, and stroke     Call 911 for any of the following:   · You have any of the following signs of a heart attack:      ? Squeezing, pressure, or pain in your chest    ? You may  also have any of the following:     ? Discomfort or pain in your back, neck, jaw, stomach, or arm    ? Shortness of breath    ? Nausea or vomiting    ? Lightheadedness or a sudden cold sweat    · You have any of the following signs of a stroke:      ? Numbness or drooping on one side of your face     ? Weakness in an arm or leg    ? Confusion or difficulty speaking    ? Dizziness, a severe headache, or vision loss    Contact your healthcare provider if:   · You have questions or concerns about your condition or care  Treatment of hyperlipidemia  may first include lifestyle changes to help decrease your lipid levels  You may also need to take medicine to lower your lipid levels  Some of the lifestyle changes you may need to make include the following:  · Maintain a healthy weight  Ask your healthcare provider how much you should weigh  Ask him or her to help you create a weight loss plan if you are overweight  Weight loss can decrease your cholesterol and triglyceride levels  · Exercise as directed  Exercise lowers your cholesterol levels and helps you maintain a healthy weight  Get 30 minutes or more of aerobic exercise 4 to 6 days each week  You can split your exercise into four 10-minute workouts instead of 30 minutes at one time  Examples of aerobic exercises include walking briskly, swimming, or riding a bike  Work with your healthcare provider to plan the best exercise program for you  · Do not smoke  Nicotine and other chemicals in cigarettes and cigars can increase your risk for a heart attack and stroke  Ask your healthcare provider for information if you currently smoke and need help to quit  E-cigarettes or smokeless tobacco still contain nicotine  Talk to your healthcare provider before you use these products  · Eat heart-healthy foods  Talk to your dietitian about a heart-healthy diet   The following will help you manage hyperlipidemia:     ? Decrease the total amount of fat you eat  Choose lean meats, fat-free or 1% fat milk, and low-fat dairy products, such as yogurt and cheese  Limit or do not eat red meat  Red meats are high in fat and cholesterol  ? Replace unhealthy fats with healthy fats  Unhealthy fats include saturated fat, trans fat, and cholesterol  Choose soft margarines that are low in saturated fat and have little or no trans fat  Monounsaturated fats are healthy fats  These are found in olive oil, canola oil, avocado, and nuts  Polyunsaturated fats are also healthy  These are found in fish, flaxseed, walnuts, and soybeans  ? Eat 5 or more servings of fruits and vegetables every day  They are low in calories and fat and a good source of essential vitamins  Include dark green, red, and orange vegetables  Examples include spinach, kale, broccoli, and carrots  ? Eat foods high in fiber  Fiber can help lower your cholesterol levels  Choose whole grain, high-fiber foods  Good choices include whole-wheat breads or cereals, beans, peas, fruits, and vegetables  · Ask your healthcare provider if it is safe for you to drink alcohol  Alcohol can increase your cholesterol and triglyceride levels  A drink of alcohol is 12 ounces of beer, 5 ounces of wine, or 1½ ounces of liquor  Follow up with your healthcare provider as directed: You may need to return for more tests  Your healthcare provider may refer you to a dietitian  Write down your questions so you remember to ask them during your visits  © Copyright [address] Information is for End User's use only and may not be sold, redistributed or otherwise used for commercial purposes  All illustrations and images included in CareNotes® are the copyrighted property of [employer]  or [facility] Arnaldo Man   The above information is an  only   It is not intended as medical advice for individual conditions or treatments  Talk to your doctor, nurse or pharmacist before following any medical regimen to see if it is safe and effective for you

## 2021-03-15 NOTE — PROGRESS NOTES
Assessment and Plan:     Problem List Items Addressed This Visit     None      Visit Diagnoses     Irritable bowel syndrome with diarrhea            BMI Counseling: Body mass index is 27 27 kg/m²  The BMI is above normal  Nutrition recommendations include decreasing portion sizes, encouraging healthy choices of fruits and vegetables, decreasing fast food intake, consuming healthier snacks, limiting drinks that contain sugar, moderation in carbohydrate intake, increasing intake of lean protein and reducing intake of saturated and trans fat  Exercise recommendations include moderate physical activity 150 minutes/week and exercising 3-5 times per week  No pharmacotherapy was ordered  Preventive health issues were discussed with patient, and age appropriate screening tests were ordered as noted in patient's After Visit Summary  Personalized health advice and appropriate referrals for health education or preventive services given if needed, as noted in patient's After Visit Summary       History of Present Illness:     Patient presents for Medicare Annual Wellness visit    Patient Care Team:  Kym Betancourt DO as PCP - General (Family Medicine)  Annalisa Yeager MD     Problem List:     Patient Active Problem List   Diagnosis    Thrombocytosis ([facility] [address] )    Anemia    Iron deficiency anemia, unspecified    Spinal stenosis    Hypertension    [facility]    Arthritis    Hernia, umbilical      Past Medical and Surgical History:     Past Medical History:   Diagnosis Date    Arthritis     Hernia, umbilical     Hypertension     Spinal stenosis      Past Surgical History:   Procedure Laterality Date    FOOT SURGERY      Toe    HERNIA REPAIR      HYSTERECTOMY      MAMMO (HISTORICAL)  2017    TONSILLECTOMY        Family History:     Family History   Problem Relation Age of Onset    Hypertension Mother     Cancer Father       Social History:        Social History     Socioeconomic History    Marital status: /Civil Union     Spouse name: None    Number of children: None    Years of education: None    Highest education level: None   Occupational History    Occupation: Retired [employer]   Social Needs    Financial resource strain: None    Food insecurity     Worry: None     Inability: None    Transportation needs     Medical: None     Non-medical: None   Tobacco Use    Smoking status: Never Smoker    Smokeless tobacco: Never Used   Substance and Sexual Activity    Alcohol use: No    Drug use: No    Sexual activity: None   Lifestyle    Physical activity     Days per week: None     Minutes per session: None    Stress: None   Relationships    Social connections     Talks on phone: None     Gets together: None     Attends Protestant service: None     Active member of club or organization: None     Attends meetings of clubs or organizations: None     Relationship status: None    Intimate partner violence     Fear of current or ex partner: None     Emotionally abused: None     Physically abused: None     Forced sexual activity: None   Other Topics Concern    None   Social History Narrative    · Most recent tobacco use screenin2019      · Occupation:   Retired -       · Caffeine intake: Moderate  Daily Tea (at least one cup)     · Illicit drugs:   None      · Guns present in home:   No      · Seat belts used routinely:   Yes      · Sunscreen used routinely:   Yes      · Smoke alarm in home:    Yes      · Advance directive:   Unsure     · Salt Intake:   Very Little      · Has the Patient had a mammogram to screen for breast cancer within 24 months:   Yes       Medications and Allergies:     Current Outpatient Medications   Medication Sig Dispense Refill    baclofen 20 mg tablet TAKE 1 TABLET BY MOUTH FOUR TIMES DAILY 120 tablet 6    Calcium Carbonate-Vitamin D 600-200 MG-UNIT TABS Take by mouth      celecoxib (CELEBREX) 200 mg capsule Take by mouth      Cholecalciferol (VITAMIN D PO) Take 5,000 Units by mouth      denosumab (PROLIA) 60 mg/mL Inject under the skin      diphenoxylate-atropine (LOMOTIL) 2 5-0 025 mg per tablet TAKE 1 TABLET BY MOUTH TWICE DAILY 60 tablet 2    esomeprazole (NEXIUM) 40 MG capsule Take by mouth      levothyroxine 50 mcg tablet TAKE 1 TABLET BY MOUTH EVERY DAY AS DIRECTED 90 tablet 3    Multiple Vitamins-Minerals (CENTRUM SILVER 50+WOMEN) TABS Take by mouth      oxyCODONE-acetaminophen (PERCOCET) 7 5-325 MG per tablet 1 tablet 3 times a day  0    potassium chloride (MICRO-K) 10 MEQ CR capsule TAKE 2 CAPSULES BY MOUTH TWICE DAILY AS DIRECTED 360 capsule 3    predniSONE 5 mg tablet   0    rosuvastatin (CRESTOR) 20 MG tablet TAKE 1 TABLET BY MOUTH EVERY DAY AS DIRECTED 90 tablet 3    telmisartan-hydrochlorothiazide (MICARDIS HCT) 80-12 5 MG per tablet TAKE 1 TABLET BY MOUTH DAILY 90 tablet 3    zolpidem (AMBIEN) 5 mg tablet Take by mouth       No current facility-administered medications for this visit  Allergies   Allergen Reactions    Aspirin     Ibuprofen       Immunizations:     Immunization History   Administered Date(s) Administered    INFLUENZA 11/03/2014, 10/26/2015, 10/27/2016, 10/31/2017, 10/16/2018    Influenza, high dose seasonal 0 7 mL 11/02/2020      Health Maintenance:         Topic Date Due    MAMMOGRAM  02/27/2018    Colorectal Cancer Screening  08/17/2019    Hepatitis C Screening  Completed         Topic Date Due    COVID-19 Vaccine (1 of 2) 02/17/1968    DTaP,Tdap,and Td Vaccines (1 - Tdap) 02/17/1973    Pneumococcal Vaccine: 65+ Years (1 of 1 - PPSV23) 02/17/2017      Medicare Health Risk Assessment:     /76   Pulse 80   Temp 97 7 °F (36 5 °C)   Resp 18   Ht 4' 9" (1 448 m)   Wt 57 2 kg (126 lb)   SpO2 98%   BMI 27 27 kg/m²          Health Risk Assessment:   Patient rates overall health as good  Patient feels that their physical health rating is much better   Patient is very satisfied with their life  Eyesight was rated as same  Hearing was rated as same  Patient feels that their emotional and mental health rating is same  Patients states they are never, rarely angry  Patient states they are never, rarely unusually tired/fatigued  Pain experienced in the last 7 days has been none  Patient states that she has experienced no weight loss or gain in last 6 months  Depression Screening:   PHQ-2 Score: 0      Fall Risk Screening: In the past year, patient has experienced: no history of falling in past year      Urinary Incontinence Screening:   Patient has not leaked urine accidently in the last six months  Home Safety:  Patient has trouble with stairs inside or outside of their home  Patient has working smoke alarms and has working carbon monoxide detector  Home safety hazards include: none  Nutrition:   Current diet is Regular  Medications:   Patient is currently taking over-the-counter supplements  OTC medications include: see medication list  Patient is not able to manage medications  Activities of Daily Living (ADLs)/Instrumental Activities of Daily Living (IADLs):   Walk and transfer into and out of bed and chair?: Yes  Dress and groom yourself?: Yes    Bathe or shower yourself?: Yes    Feed yourself?  Yes  Do your laundry/housekeeping?: Yes  Manage your money, pay your bills and track your expenses?: Yes  Make your own meals?: Yes    Do your own shopping?: Yes    Previous Hospitalizations:   Any hospitalizations or ED visits within the last 12 months?: No      Advance Care Planning:   Living will: No    Durable POA for healthcare: No    Advanced directive: No    Five wishes given: Yes    Patient declined ACP directive: No    End of Life Decisions reviewed with patient: Yes    Provider agrees with end of life decisions: Yes      Cognitive Screening:   Provider or family/friend/caregiver concerned regarding cognition?: No    PREVENTIVE SCREENINGS      Cardiovascular Screening:    General: Screening Not Indicated, History Lipid Disorder and Risks and Benefits Discussed    Due for: Lipid Panel      Breast Cancer Screening:     General: Risks and Benefits Discussed      Cervical Cancer Screening:    General: Screening Not Indicated      Osteoporosis Screening:    General: Screening Not Indicated, History Osteoporosis and Risks and Benefits Discussed      Abdominal Aortic Aneurysm (AAA) Screening:        General: Patient Declines      Lung Cancer Screening:     General: Screening Not Indicated      Hepatitis C Screening:    General: Screening Current    Hep C Screening Accepted: No     Screening, Brief Intervention, and Referral to Treatment (SBIRT)    Screening  Typical number of drinks in a day: 0  Typical number of drinks in a week: 0  Interpretation: Low risk drinking behavior  Single Item Drug Screening:  How often have you used an illegal drug (including marijuana) or a prescription medication for non-medical reasons in the past year? never    Single Item Drug Screen Score: 0  Interpretation: Negative screen for possible drug use disorder    Review of Current Opioid Use    Opioid Risk Tool (ORT) Interpretation: Complete Opioid Risk Tool (ORT)    [place]MP or [place]  reviewed  No red flags were identified    Other Counseling Topics:   Car/seat belt/driving safety, skin self-exam, sunscreen and calcium and vitamin D intake and regular weightbearing exercise         Beatrice Crawford DO

## 2021-03-15 NOTE — PROGRESS NOTES
BMI Counseling: Body mass index is 27 27 kg/m²  The BMI is above normal  Nutrition recommendations include decreasing portion sizes, encouraging healthy choices of fruits and vegetables, decreasing fast food intake, consuming healthier snacks, limiting drinks that contain sugar, moderation in carbohydrate intake, increasing intake of lean protein and reducing intake of saturated and trans fat  Exercise recommendations include moderate physical activity 150 minutes/week and exercising 3-5 times per week  No pharmacotherapy was ordered  Assessment/Plan:  73 y o female, well-known to me for many years presents for f/u and evaluation of the following medical issues:       F/u and eval HTN:  /76 controlled on Micardis HCTZ    F/u and eval NIDDM:  No issue, but observing    F/u and eval HLD:  On Crestor 20 mg OD and doing well    F/u and eval hypothyroid -- on Synthroid 50 mcg OD    F/u and eval deconditioning:  Walking about inside of building, does some exerdises to keep mobility--avoid sitting    F/u and eval polypharmacy:  All medications reviewed in depth and discussed with pt, aury the use of acid reducing medications, both OTC and Rx                  Problem List Items Addressed This Visit        Digestive    Irritable bowel syndrome with diarrhea    Relevant Medications    diphenoxylate-atropine (LOMOTIL) 2 5-0 025 mg per tablet       Cardiovascular and Mediastinum    Hypertension - Primary    Relevant Orders    CBC    UA w Reflex to Microscopic w Reflex to Culture    Comprehensive metabolic panel    Lipid panel    TSH, 3rd generation    Vitamin D 25 hydroxy    Hemoglobin A1C       Other    Spinal stenosis    Polymyalgia rheumatica (HCC)    Increased BMI      Other Visit Diagnoses     Encounter for Medicare annual wellness exam        Essential (hemorrhagic) thrombocythemia ([facility] [address] )        Relevant Orders    CBC    Mixed hyperlipidemia        Hypothyroidism, unspecified type        Relevant Orders    TSH, 3rd generation    HTN (hypertension), benign        Vitamin D insufficiency        Relevant Orders    Vitamin D 25 hydroxy    Elevated blood sugar        Relevant Orders    Hemoglobin A1C            Subjective:      Patient ID: Swetha Duarte is a 73 y o  female  HPI   73 y o female, well-known to me for many years presents for f/u and evaluation of the following medical issues:       F/u and eval HTN:  /76 controlled on Micardis HCTZ    F/u and eval NIDDM:  No issue, but observing    F/u and eval HLD:  On Crestor 20 mg OD and doing well    F/u and eval hypothyroid -- on Synthroid 50 mcg OD    F/u and eval deconditioning:  Walking about inside of building, does some exerdises to keep mobility--avoid sitting    F/u and eval polypharmacy:  All medications reviewed in depth and discussed with pt, aury the use of acid reducing medications, both OTC and Rx  The following portions of the patient's history were reviewed and updated as appropriate:   Past Medical History:  She has a past medical history of Arthritis, Hernia, umbilical, Hypertension, and Spinal stenosis  ,  _______________________________________________________________________  Medical Problems:  does not have any pertinent problems on file ,  _______________________________________________________________________  Past Surgical History:   has a past surgical history that includes Hysterectomy; Foot surgery; Hernia repair; Tonsillectomy; and Mammo (historical) (2017)  ,  _______________________________________________________________________  Family History:  family history includes Cancer in her father; Hypertension in her mother ,  _______________________________________________________________________  Social History:   reports that she has never smoked  She has never used smokeless tobacco  She reports that she does not drink alcohol or use drugs  ,  _______________________________________________________________________  Allergies:  is allergic to aspirin and ibuprofen     _______________________________________________________________________  Current Outpatient Medications   Medication Sig Dispense Refill    baclofen 20 mg tablet TAKE 1 TABLET BY MOUTH FOUR TIMES DAILY 120 tablet 6    Calcium Carbonate-Vitamin D 600-200 MG-UNIT TABS Take by mouth      celecoxib (CELEBREX) 200 mg capsule Take by mouth      Cholecalciferol (VITAMIN D PO) Take 5,000 Units by mouth      denosumab (PROLIA) 60 mg/mL Inject under the skin      diphenoxylate-atropine (LOMOTIL) 2 5-0 025 mg per tablet Take 1 tablet by mouth 2 (two) times a day 60 tablet 2    esomeprazole (NEXIUM) 40 MG capsule Take by mouth      levothyroxine 50 mcg tablet TAKE 1 TABLET BY MOUTH EVERY DAY AS DIRECTED 90 tablet 3    Multiple Vitamins-Minerals (CENTRUM SILVER 50+WOMEN) TABS Take by mouth      oxyCODONE-acetaminophen (PERCOCET) 7 5-325 MG per tablet 1 tablet 3 times a day  0    potassium chloride (MICRO-K) 10 MEQ CR capsule TAKE 2 CAPSULES BY MOUTH TWICE DAILY AS DIRECTED 360 capsule 3    predniSONE 5 mg tablet   0    rosuvastatin (CRESTOR) 20 MG tablet TAKE 1 TABLET BY MOUTH EVERY DAY AS DIRECTED 90 tablet 3    telmisartan-hydrochlorothiazide (MICARDIS HCT) 80-12 5 MG per tablet TAKE 1 TABLET BY MOUTH DAILY 90 tablet 3    zolpidem (AMBIEN) 5 mg tablet Take by mouth       No current facility-administered medications for this visit       _______________________________________________________________________  Review of Systems   Constitutional: Negative for activity change (in WC), appetite change, chills, diaphoresis, fatigue and fever  HENT: Negative for congestion, ear discharge, ear pain, facial swelling, nosebleeds, sore throat, tinnitus, trouble swallowing and voice change  Eyes: Negative for photophobia, pain, discharge, redness, itching and visual disturbance  Respiratory: Negative for apnea, cough, choking, chest tightness and shortness of breath  Cardiovascular: Negative for chest pain, palpitations and leg swelling  Denies any and all cardiac symptoms   Gastrointestinal: Negative for abdominal distention, abdominal pain, blood in stool, constipation, diarrhea, nausea and vomiting  Endocrine: Negative for cold intolerance, heat intolerance, polydipsia, polyphagia and polyuria  Genitourinary: Negative for decreased urine volume, difficulty urinating, dysuria, enuresis, frequency, hematuria, pelvic pain, urgency and vaginal bleeding  Musculoskeletal: Negative for arthralgias, back pain, gait problem, joint swelling, neck pain and neck stiffness  Skin: Negative for color change, pallor and rash  Allergic/Immunologic: Negative for immunocompromised state  Neurological: Negative for dizziness, seizures, facial asymmetry, light-headedness, numbness and headaches  Hematological: Negative for adenopathy  Psychiatric/Behavioral: Negative for agitation, behavioral problems, confusion, decreased concentration, dysphoric mood and hallucinations  Objective:  Vitals:    03/15/21 0906   BP: 122/76   Pulse: 80   Resp: 18   Temp: 97 7 °F (36 5 °C)   SpO2: 98%   Weight: 57 2 kg (126 lb)   Height: 4' 9" (1 448 m)     Body mass index is 27 27 kg/m²  Physical Exam  Vitals signs and nursing note reviewed  Constitutional:       General: She is not in acute distress  Appearance: She is well-developed  She is not diaphoretic  HENT:      Head: Normocephalic and atraumatic  Nose: Nose normal    Eyes:      Conjunctiva/sclera: Conjunctivae normal       Pupils: Pupils are equal, round, and reactive to light  Neck:      Musculoskeletal: Normal range of motion and neck supple  Thyroid: No thyromegaly  Trachea: No tracheal deviation  Cardiovascular:      Rate and Rhythm: Normal rate and regular rhythm  Heart sounds: Normal heart sounds  Pulmonary:      Effort: No respiratory distress        Breath sounds: Normal breath sounds  No wheezing or rales  Chest:      Chest wall: No tenderness  Abdominal:      General: Bowel sounds are normal  There is no distension  Palpations: Abdomen is soft  There is no mass  Tenderness: There is no abdominal tenderness  There is no guarding or rebound  Musculoskeletal: Normal range of motion  General: No tenderness or deformity  Skin:     General: Skin is warm and dry  Coloration: Skin is not pale  Findings: No erythema or rash  Neurological:      General: No focal deficit present  Mental Status: She is alert and oriented to person, place, and time  Cranial Nerves: No cranial nerve deficit  Psychiatric:         Mood and Affect: Mood normal          Behavior: Behavior normal          Thought Content: Thought content normal          Judgment: Judgment normal         Comorbidities addressed herein increase the amount and complexity of the data evaluated by me and pose a risk of complications and /or morbidity re pt management  It is my role to address these issues with the pt, and family if present, such that their understanding of the key problems and issues listed and discussed  are understood  This I do through thorough explaination, both verbally, and with illustrations, if  available  In addition, considerable time was spent by me in reviewing all tests, consults from speciality physicians, ordering medications, and determining the best tests to be ordered for this patient's medical condition

## 2021-03-18 ENCOUNTER — TELEPHONE (OUTPATIENT)
Dept: ADMINISTRATIVE | Facility: OTHER | Age: 71
End: 2021-03-18

## 2021-03-18 NOTE — TELEPHONE ENCOUNTER
Upon review of the In Basket request and the patient's chart, initial outreach has been made via fax, please see Contacts section for details       Thank you  Janae Murphy

## 2021-03-18 NOTE — TELEPHONE ENCOUNTER
Upon review of the In Basket request we were able to locate, review, and update the patient chart as requested for CRC: Colonoscopy  Any additional questions or concerns should be emailed to the Practice Liaisons via [email or web address] email, please do not reply via In Basket      Thank you  Marychuy Soares

## 2021-03-18 NOTE — LETTER
Procedure Request Form: Colonoscopy      Date Requested: 21  Patient: Gary Gregory  Patient : 1950   Referring Provider: Gia Moan, DO        Date of Procedure ______________________________       The above patient has informed us that they have completed their   most recent Colonoscopy at your facility  Please complete   this form and attach all corresponding procedure reports/results  Comments __________________________________________________________  ____________________________________________________________________  ____________________________________________________________________  ____________________________________________________________________    Facility Completing Procedure _________________________________________    Form Completed By (print name) _______________________________________      Signature __________________________________________________________      These reports are needed for  compliance    Please fax this completed form and a copy of the procedure report to our office located at [place] [address] as soon as possible to [phone] silvano Butt: Phone [phone]    We thank you for your assistance in treating our mutual patient

## 2021-03-18 NOTE — TELEPHONE ENCOUNTER
----- Message from Star Roberson sent at 3/15/2021  9:15 AM EDT -----  Regarding: care gap request  03/15/21 9:15 AM    Hello, our patient patient above has had CRC: Colonoscopy completed/performed   Please assist in updating the patient chart by making an External outreach to Dr Jay Kapoor facility located in [facility] The date of service is  may 14, 2019    Thank you,  Star Roberson   [address] [place] [address]

## 2021-03-19 ENCOUNTER — HOSPITAL ENCOUNTER (OUTPATIENT)
Dept: RADIOLOGY | Facility: HOSPITAL | Age: 71
Discharge: HOME | End: 2021-03-19
Attending: FAMILY MEDICINE
Payer: MEDICARE

## 2021-03-19 ENCOUNTER — TRANSCRIBE ORDERS (OUTPATIENT)
Dept: LAB | Facility: CLINIC | Age: 71
End: 2021-03-19

## 2021-03-19 ENCOUNTER — APPOINTMENT (OUTPATIENT)
Dept: LAB | Facility: CLINIC | Age: 71
End: 2021-03-19
Payer: MEDICARE

## 2021-03-19 ENCOUNTER — TRANSCRIBE ORDERS (OUTPATIENT)
Dept: RADIOLOGY | Facility: HOSPITAL | Age: 71
End: 2021-03-19

## 2021-03-19 DIAGNOSIS — M35.3 POLYMYALGIA RHEUMATICA (HCC): ICD-10-CM

## 2021-03-19 DIAGNOSIS — M19.011 PRIMARY OSTEOARTHRITIS OF RIGHT SHOULDER: ICD-10-CM

## 2021-03-19 DIAGNOSIS — D50.9 IRON DEFICIENCY ANEMIA, UNSPECIFIED IRON DEFICIENCY ANEMIA TYPE: ICD-10-CM

## 2021-03-19 DIAGNOSIS — E03.9 HYPOTHYROIDISM, UNSPECIFIED TYPE: ICD-10-CM

## 2021-03-19 DIAGNOSIS — E83.52 HYPERCALCEMIA: Primary | ICD-10-CM

## 2021-03-19 DIAGNOSIS — D75.839 THROMBOCYTOSIS: ICD-10-CM

## 2021-03-19 DIAGNOSIS — D47.3 ESSENTIAL (HEMORRHAGIC) THROMBOCYTHEMIA (HCC): ICD-10-CM

## 2021-03-19 DIAGNOSIS — M48.061 SPINAL STENOSIS, LUMBAR REGION, WITHOUT NEUROGENIC CLAUDICATION: ICD-10-CM

## 2021-03-19 DIAGNOSIS — M79.7 SCAPULOHUMERAL FIBROSITIS: ICD-10-CM

## 2021-03-19 DIAGNOSIS — R73.9 ELEVATED BLOOD SUGAR: ICD-10-CM

## 2021-03-19 DIAGNOSIS — I10 ESSENTIAL HYPERTENSION: ICD-10-CM

## 2021-03-19 DIAGNOSIS — M48.05 SPINAL STENOSIS OF THORACOLUMBAR REGION: Primary | ICD-10-CM

## 2021-03-19 DIAGNOSIS — E55.9 VITAMIN D INSUFFICIENCY: ICD-10-CM

## 2021-03-19 LAB
25(OH)D3 SERPL-MCNC: 35 NG/ML (ref 30–100)
ALBUMIN SERPL BCP-MCNC: 3.5 G/DL (ref 3.5–5)
ALP SERPL-CCNC: 130 U/L (ref 46–116)
ALT SERPL W P-5'-P-CCNC: 21 U/L (ref 12–78)
ANION GAP SERPL CALCULATED.3IONS-SCNC: 7 MMOL/L (ref 4–13)
AST SERPL W P-5'-P-CCNC: 14 U/L (ref 5–45)
BASOPHILS # BLD AUTO: 0.04 THOUSANDS/ΜL (ref 0–0.1)
BASOPHILS NFR BLD AUTO: 1 % (ref 0–1)
BILIRUB SERPL-MCNC: 0.54 MG/DL (ref 0.2–1)
BILIRUB UR QL STRIP: NEGATIVE
BUN SERPL-MCNC: 19 MG/DL (ref 5–25)
CALCIUM SERPL-MCNC: 10.8 MG/DL (ref 8.3–10.1)
CHLORIDE SERPL-SCNC: 104 MMOL/L (ref 100–108)
CHOLEST SERPL-MCNC: 176 MG/DL (ref 50–200)
CLARITY UR: CLEAR
CO2 SERPL-SCNC: 28 MMOL/L (ref 21–32)
COLOR UR: YELLOW
CREAT SERPL-MCNC: 0.9 MG/DL (ref 0.6–1.3)
CRP SERPL QL: 4.4 MG/L
EOSINOPHIL # BLD AUTO: 0.15 THOUSAND/ΜL (ref 0–0.61)
EOSINOPHIL NFR BLD AUTO: 3 % (ref 0–6)
ERYTHROCYTE [DISTWIDTH] IN BLOOD BY AUTOMATED COUNT: 13.1 % (ref 11.6–15.1)
ERYTHROCYTE [SEDIMENTATION RATE] IN BLOOD: 36 MM/HOUR (ref 0–29)
EST. AVERAGE GLUCOSE BLD GHB EST-MCNC: 114 MG/DL
FERRITIN SERPL-MCNC: 92 NG/ML (ref 8–388)
GFR SERPL CREATININE-BSD FRML MDRD: 65 ML/MIN/1.73SQ M
GLUCOSE P FAST SERPL-MCNC: 90 MG/DL (ref 65–99)
GLUCOSE UR STRIP-MCNC: NEGATIVE MG/DL
HBA1C MFR BLD: 5.6 %
HCT VFR BLD AUTO: 39.7 % (ref 34.8–46.1)
HDLC SERPL-MCNC: 73 MG/DL
HGB BLD-MCNC: 12.7 G/DL (ref 11.5–15.4)
HGB UR QL STRIP.AUTO: NEGATIVE
IMM GRANULOCYTES # BLD AUTO: 0.02 THOUSAND/UL (ref 0–0.2)
IMM GRANULOCYTES NFR BLD AUTO: 0 % (ref 0–2)
KETONES UR STRIP-MCNC: NEGATIVE MG/DL
LDLC SERPL CALC-MCNC: 90 MG/DL (ref 0–100)
LEUKOCYTE ESTERASE UR QL STRIP: NEGATIVE
LYMPHOCYTES # BLD AUTO: 0.94 THOUSANDS/ΜL (ref 0.6–4.47)
LYMPHOCYTES NFR BLD AUTO: 18 % (ref 14–44)
MCH RBC QN AUTO: 30.5 PG (ref 26.8–34.3)
MCHC RBC AUTO-ENTMCNC: 32 G/DL (ref 31.4–37.4)
MCV RBC AUTO: 95 FL (ref 82–98)
MONOCYTES # BLD AUTO: 0.45 THOUSAND/ΜL (ref 0.17–1.22)
MONOCYTES NFR BLD AUTO: 9 % (ref 4–12)
NEUTROPHILS # BLD AUTO: 3.54 THOUSANDS/ΜL (ref 1.85–7.62)
NEUTS SEG NFR BLD AUTO: 69 % (ref 43–75)
NITRITE UR QL STRIP: NEGATIVE
NONHDLC SERPL-MCNC: 103 MG/DL
NRBC BLD AUTO-RTO: 0 /100 WBCS
PH UR STRIP.AUTO: 5.5 [PH]
PLATELET # BLD AUTO: 475 THOUSANDS/UL (ref 149–390)
PMV BLD AUTO: 8.7 FL (ref 8.9–12.7)
POTASSIUM SERPL-SCNC: 4 MMOL/L (ref 3.5–5.3)
PROT SERPL-MCNC: 7.7 G/DL (ref 6.4–8.2)
PROT UR STRIP-MCNC: NEGATIVE MG/DL
RBC # BLD AUTO: 4.16 MILLION/UL (ref 3.81–5.12)
SODIUM SERPL-SCNC: 139 MMOL/L (ref 136–145)
SP GR UR STRIP.AUTO: 1.02 (ref 1–1.03)
TRIGL SERPL-MCNC: 66 MG/DL
TSH SERPL DL<=0.05 MIU/L-ACNC: 2.35 UIU/ML (ref 0.36–3.74)
UROBILINOGEN UR QL STRIP.AUTO: 0.2 E.U./DL
WBC # BLD AUTO: 5.14 THOUSAND/UL (ref 4.31–10.16)

## 2021-03-19 PROCEDURE — 85652 RBC SED RATE AUTOMATED: CPT

## 2021-03-19 PROCEDURE — 82728 ASSAY OF FERRITIN: CPT

## 2021-03-19 PROCEDURE — 83036 HEMOGLOBIN GLYCOSYLATED A1C: CPT

## 2021-03-19 PROCEDURE — 81003 URINALYSIS AUTO W/O SCOPE: CPT | Performed by: FAMILY MEDICINE

## 2021-03-19 PROCEDURE — 73030 X-RAY EXAM OF SHOULDER: CPT

## 2021-03-19 PROCEDURE — 84443 ASSAY THYROID STIM HORMONE: CPT

## 2021-03-19 PROCEDURE — 80053 COMPREHEN METABOLIC PANEL: CPT

## 2021-03-19 PROCEDURE — 82306 VITAMIN D 25 HYDROXY: CPT

## 2021-03-19 PROCEDURE — 85025 COMPLETE CBC W/AUTO DIFF WBC: CPT

## 2021-03-19 PROCEDURE — 86140 C-REACTIVE PROTEIN: CPT

## 2021-03-19 PROCEDURE — 36415 COLL VENOUS BLD VENIPUNCTURE: CPT

## 2021-03-19 PROCEDURE — 80061 LIPID PANEL: CPT

## 2021-03-22 ENCOUNTER — TELEPHONE (OUTPATIENT)
Dept: FAMILY MEDICINE CLINIC | Facility: CLINIC | Age: 71
End: 2021-03-22

## 2021-03-22 NOTE — TELEPHONE ENCOUNTER
----- Message from Jg Mcgregor DO sent at 3/21/2021  9:04 PM EDT -----  Please tell patient she needs to repeat a few of her lab tests --namely calcium, sed rate,and C reactive protein  Check with her has to whether I am to order that or Mu Mom, her rheumatologist, I think probably the later    If it is me, I'd do in a mo

## 2021-03-23 DIAGNOSIS — K58.0 IRRITABLE BOWEL SYNDROME WITH DIARRHEA: Primary | ICD-10-CM

## 2021-03-23 DIAGNOSIS — Z12.11 COLON CANCER SCREENING: ICD-10-CM

## 2021-03-24 ENCOUNTER — TELEPHONE (OUTPATIENT)
Dept: FAMILY MEDICINE CLINIC | Facility: CLINIC | Age: 71
End: 2021-03-24

## 2021-03-24 NOTE — TELEPHONE ENCOUNTER
Patient was called back and made aware of labs results  She asked about shoulder xray however the results have not yet been documented  Told her once its finalized we will call to give results

## 2021-03-29 ENCOUNTER — TELEPHONE (OUTPATIENT)
Dept: FAMILY MEDICINE CLINIC | Facility: CLINIC | Age: 71
End: 2021-03-29

## 2021-03-29 NOTE — TELEPHONE ENCOUNTER
Pt called - she received a letter in the mail about needed a colonoscopy but she had it done in 2020     Pt also looking for results of Xray

## 2021-03-30 ENCOUNTER — APPOINTMENT (OUTPATIENT)
Dept: LAB | Facility: CLINIC | Age: 71
End: 2021-03-30
Payer: MEDICARE

## 2021-03-30 DIAGNOSIS — M79.7 SCAPULOHUMERAL FIBROSITIS: ICD-10-CM

## 2021-03-30 DIAGNOSIS — E83.52 HYPERCALCEMIA: ICD-10-CM

## 2021-03-30 DIAGNOSIS — M48.061 SPINAL STENOSIS, LUMBAR REGION, WITHOUT NEUROGENIC CLAUDICATION: ICD-10-CM

## 2021-03-30 DIAGNOSIS — M35.3 POLYMYALGIA RHEUMATICA (HCC): ICD-10-CM

## 2021-03-30 LAB
CALCIUM SERPL-MCNC: 10.4 MG/DL (ref 8.3–10.1)
CREAT SERPL-MCNC: 0.87 MG/DL (ref 0.6–1.3)
GFR SERPL CREATININE-BSD FRML MDRD: 68 ML/MIN/1.73SQ M
PTH-INTACT SERPL-MCNC: 30.3 PG/ML (ref 18.4–80.1)

## 2021-03-30 PROCEDURE — 82310 ASSAY OF CALCIUM: CPT

## 2021-03-30 PROCEDURE — 83970 ASSAY OF PARATHORMONE: CPT

## 2021-03-30 PROCEDURE — 82565 ASSAY OF CREATININE: CPT

## 2021-03-30 PROCEDURE — 36415 COLL VENOUS BLD VENIPUNCTURE: CPT

## 2021-03-30 NOTE — TELEPHONE ENCOUNTER
Called and left message with patient about xray results and told her to disregard the colonoscopy referral as she did have it completed

## 2021-03-31 ENCOUNTER — TELEPHONE (OUTPATIENT)
Dept: FAMILY MEDICINE CLINIC | Facility: CLINIC | Age: 71
End: 2021-03-31

## 2021-03-31 DIAGNOSIS — M19.019 ARTHRITIS OF SHOULDER: Primary | ICD-10-CM

## 2021-03-31 NOTE — TELEPHONE ENCOUNTER
Patient informed of shoulder XR results showing severe arthritis  Dr Lauren Barber advises the patient follow up with Dr Randy Beach, a shoulder specialist  Told the patient we will send information for specialist in the mail

## 2021-04-01 ENCOUNTER — TELEPHONE (OUTPATIENT)
Dept: HEMATOLOGY ONCOLOGY | Facility: CLINIC | Age: 71
End: 2021-04-01

## 2021-04-02 ENCOUNTER — OFFICE VISIT (OUTPATIENT)
Dept: HEMATOLOGY ONCOLOGY | Facility: CLINIC | Age: 71
End: 2021-04-02
Payer: MEDICARE

## 2021-04-02 VITALS
HEART RATE: 65 BPM | BODY MASS INDEX: 27.18 KG/M2 | OXYGEN SATURATION: 97 % | RESPIRATION RATE: 18 BRPM | SYSTOLIC BLOOD PRESSURE: 122 MMHG | WEIGHT: 126 LBS | HEIGHT: 57 IN | DIASTOLIC BLOOD PRESSURE: 72 MMHG | TEMPERATURE: 97.2 F

## 2021-04-02 DIAGNOSIS — D75.839 THROMBOCYTOSIS: Primary | ICD-10-CM

## 2021-04-02 PROCEDURE — 99213 OFFICE O/P EST LOW 20 MIN: CPT | Performed by: INTERNAL MEDICINE

## 2021-04-02 NOTE — PROGRESS NOTES
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  Previous history of iron deficiency anemia and she had EGD and colonoscopy  She took 1 iron tablet daily but now she takes it 3 days a week  Anemia improved  History of arthritis and she ambulates with a walker  Has tiredness                Current Outpatient Medications:     baclofen 20 mg tablet, TAKE 1 TABLET BY MOUTH FOUR TIMES DAILY, Disp: 120 tablet, Rfl: 6    Calcium Carbonate-Vitamin D 600-200 MG-UNIT TABS, Take by mouth, Disp: , Rfl:     celecoxib (CELEBREX) 200 mg capsule, Take by mouth, Disp: , Rfl:     Cholecalciferol (VITAMIN D PO), Take 5,000 Units by mouth, Disp: , Rfl:     denosumab (PROLIA) 60 mg/mL, Inject under the skin, Disp: , Rfl:     diphenoxylate-atropine (LOMOTIL) 2 5-0 025 mg per tablet, Take 1 tablet by mouth 2 (two) times a day, Disp: 60 tablet, Rfl: 2    esomeprazole (NEXIUM) 40 MG capsule, Take by mouth, Disp: , Rfl:     levothyroxine 50 mcg tablet, TAKE 1 TABLET BY MOUTH EVERY DAY AS DIRECTED, Disp: 90 tablet, Rfl: 3    Multiple Vitamins-Minerals (CENTRUM SILVER 50+WOMEN) TABS, Take by mouth, Disp: , Rfl:     oxyCODONE-acetaminophen (PERCOCET) 7 5-325 MG per tablet, 1 tablet 3 times a day, Disp: , Rfl: 0    potassium chloride (MICRO-K) 10 MEQ CR capsule, TAKE 2 CAPSULES BY MOUTH TWICE DAILY AS DIRECTED, Disp: 360 capsule, Rfl: 3    predniSONE 5 mg tablet, , Disp: , Rfl: 0    rosuvastatin (CRESTOR) 20 MG tablet, TAKE 1 TABLET BY MOUTH EVERY DAY AS DIRECTED, Disp: 90 tablet, Rfl: 3    telmisartan-hydrochlorothiazide (MICARDIS HCT) 80-12 5 MG per tablet, TAKE 1 TABLET BY MOUTH DAILY, Disp: 90 tablet, Rfl: 3    zolpidem (AMBIEN) 5 mg tablet, Take by mouth, Disp: , Rfl:     Allergies   Allergen Reactions    Aspirin     Ibuprofen        Oncology History    No history exists         ROS:  04/02/21 Reviewed 12 systems: See symptoms in HPI  Presently no  Other neurological, cardiac, pulmonary, GI and  symptoms other than listed above  No fever, chills, bleeding, bone pains, skin rash, weight loss,   weakness, numbness,  claudication   No frequent infections  Not unusually sensitive to heat or cold  No swelling of the ankles  No swollen glands  Patient is anxious  /72 (BP Location: Left arm, Patient Position: Sitting, Cuff Size: Adult)   Pulse 65   Temp (!) 97 2 °F (36 2 °C)   Resp 18   Ht 4' 9" (1 448 m)   Wt 57 2 kg (126 lb) Comment: (patient reported)  SpO2 97%   BMI 27 27 kg/m²     Physical Exam:  Alert, oriented, not in distress, stable vitals, no icterus, no oral thrush, no palpable neck mass, clear lung fields, regular heart rate, abdomen  soft and non tender, no palpable abdominal mass, no ascites, no edema of ankles, no calf tenderness, no focal neurological deficit, no skin rash, no palpable lymphadenopathy in the neck and axillary areas,  no clubbing  Patient is anxious  Performance status 3  Right ankle brace  Patient goes to Dr Lindsay Schwab for gyn examination and mammography    IMAGING:  IMPRESSION:     Small right hepatic lobe cyst accounting for recent CT finding    No suspicious hepatic lesions      Moderately severe pancreatic atrophy      Otherwise, unremarkable MRI abdomen               Workstation performed: [id number]      Imaging    MRI abdomen w wo contrast (Order: [id number]) - 9/4/2018      LABS:    Results for orders placed or performed in visit on 03/30/21   Calcium   Result Value Ref Range    Calcium 10 4 (H) 8 3 - 10 1 mg/dL   PTH, intact   Result Value Ref Range    PTH 30 3 18 4 - 80 1 pg/mL   Creatinine, serum   Result Value Ref Range    Creatinine 0 87 0 60 - 1 30 mg/dL    eGFR 68 ml/min/1 73sq m     Labs, Imaging, & Other studies:   All pertinent labs and imaging studies were personally reviewed    Lab Results   Component Value Date    K 4 0 03/19/2021     03/19/2021    CO2 28 03/19/2021    BUN 19 03/19/2021 CREATININE 0 87 03/30/2021    GLUF 90 03/19/2021    CALCIUM 10 4 (H) 03/30/2021    CORRECTEDCA 10 3 (H) 01/27/2020    AST 14 03/19/2021    ALT 21 03/19/2021    ALKPHOS 130 (H) 03/19/2021    EGFR 68 03/30/2021     Lab Results   Component Value Date    WBC 5 14 03/19/2021    HGB 12 7 03/19/2021    HCT 39 7 03/19/2021    MCV 95 03/19/2021     (H) 03/19/2021     Normal differential count  Sedimentation rate 36  Normal ferritin  Normal vitamin-D, TSH and lipid profile  Platelet counts have been fluctuating between 459,000 and 598,000  This time platelet count [id number]  Alkaline phosphatase 130 and that is not a new finding  Reviewed  CBC, CMP, sedimentation rate, ferritin, vitamin-D, TSH and lipid profile and discussed with patient  Assessment and plan:     Patient has history of JAK2 mutation negative secondary thrombocytosis for the last several years, secondary to rheumatological disorder and platelet count was higher when she also had iron deficiency  No more iron deficiency  No more anemia  Patient had EGD and colonoscopy previously  Negative Hemoccult stool test previously     Instead of taking 1 iron tablet daily she will be taking  1 iron tablet every Monday Wednesday and Friday  She has some tiredness and arthritic symptoms  She takes Celebrex for arthritis    Physical examination and test results are as recorded and discussed  Blood counts are being monitored     Patient states she is up-to-date with her medical checkups and health screening tests including gyn examination and mammography   All discussed in detail  Questions answered  Discussed the importance of self-breast examination, eating healthy foods, staying active and health screening test   Patient needs some assistance in her care  Discussed precautions against coronavirus  Patient goes to Dr Nancy Bethea for gyn examination and mammography      Patient will continue to follow with her primary physician and other consultants  See diagnoses and orders below  1  Thrombocytosis (HCC)    - CBC and differential; Future  - Comprehensive metabolic panel; Future            Blood work before next visit in 6 months      Patient voiced understanding and agrees      Counseling / Coordination of Care      Provided counseling and support

## 2021-04-13 ENCOUNTER — APPOINTMENT (OUTPATIENT)
Dept: LAB | Facility: CLINIC | Age: 71
End: 2021-04-13
Payer: MEDICARE

## 2021-04-13 DIAGNOSIS — M35.3 POLYMYALGIA RHEUMATICA (HCC): ICD-10-CM

## 2021-04-13 DIAGNOSIS — E83.52 HYPERCALCEMIA: ICD-10-CM

## 2021-04-13 LAB
CALCIUM SERPL-MCNC: 10.7 MG/DL (ref 8.3–10.1)
PTH-INTACT SERPL-MCNC: 27.9 PG/ML (ref 18.4–80.1)

## 2021-04-13 PROCEDURE — 82310 ASSAY OF CALCIUM: CPT

## 2021-04-13 PROCEDURE — 36415 COLL VENOUS BLD VENIPUNCTURE: CPT

## 2021-04-13 PROCEDURE — 83970 ASSAY OF PARATHORMONE: CPT

## 2021-04-30 NOTE — PROGRESS NOTES
Assessment/Plan:  NGE  Osteoporosis- obtain old records, Prolia in '16, Ca 1,200 mg/d with Vit D, Exercise 3/wk  RTO 1 yr  SBE monthly,  3 D Mammo  EGD/Colonoscopy '19- 2 stomach polyps, gastritis, poor prep limiting study, was to be repeated  Ca-  Discontinued in 11/20 due to hypercalcemia, referred to endocrinologist by Dr Song Miller    Exercise-  Unable to, uses a walker  Diagnoses and all orders for this visit:    Encounter for annual routine gynecological examination    Screening mammogram, encounter for  -     Mammo screening bilateral w 3d & cad; Future    Post-menopausal  -     DXA bone density spine hip and pelvis; Future    Screening for osteoporosis  -     DXA bone density spine hip and pelvis; Future              Subjective:        Patient ID: Arlen Mclaughlin is a 73 y o  female  Leena Dates returns for an annual visit  I have not seen her since November of 2016  Since that time she continued to use Prolia for a total of 8 doses which was finished last year  Her PCP wanted her to have another BMD  She stopped calcium supplementations last November due to hypercalcemia and is referred to an endocrinologist who she will see in July  She is still sexually active and continues to have difficulty walking requiring a cane  The following portions of the patient's history were reviewed and updated as appropriate: She  has a past medical history of Arthritis, Hernia, umbilical, Hypertension, and Spinal stenosis    Patient Active Problem List    Diagnosis Date Noted    Irritable bowel syndrome with diarrhea 03/15/2021    Increased BMI 03/15/2021    [facility]a [facility] 07/30/2020    Arthritis     Hernia, umbilical     Spinal stenosis 04/20/2020    Hypertension 04/20/2020    Iron deficiency anemia, unspecified 01/08/2019    Thrombocytosis ([facility] ) 08/07/2018    Anemia 08/07/2018   G1,P1; SAVD- M  Menorrhagia/Dysmenorrhea - ANN 31 complicated by infection, bacteremia  GERD  Osteoarthritis  Hypertension  Hypercholesterolemia  Hypothyroidism  Spinal stenosis- chronic back pain requiring narcotics  Fibromyalgia      Osteoporosis -  Treated by PCP, finished 8 years of Prolia '22, had been on Evista prior to that  Insomnia  She  has a past surgical history that includes Hysterectomy; Foot surgery; Hernia repair; Tonsillectomy; and Mammo (historical) (2017)  FH:  M- 87- L face Basal Cell Ca, RA, Fall 7/16- damaged nose cartilage  F- d 64 Colon Ca  S - 63 A  Fib, DM, OA  S- 67 Endometrial Ca '16- needs Chemo and Rads- 2 + nodes , d 78 6/18  2 MA- Breast Ca  Her family history includes Cancer in her father; Hypertension in her mother  She  reports that she has never smoked  She has never used smokeless tobacco  She reports that she does not drink alcohol or use drugs  SH:  Retired 4/30/14,  '73  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  Since 2014 has lived in a 1 floor apartment with a ramp leading up to the entrance    Uses a walker to ambulate      Current Outpatient Medications   Medication Sig Dispense Refill    baclofen 20 mg tablet TAKE 1 TABLET BY MOUTH FOUR TIMES DAILY 120 tablet 6    Calcium Carbonate-Vitamin D 600-200 MG-UNIT TABS Take by mouth      celecoxib (CELEBREX) 200 mg capsule Take by mouth      Cholecalciferol (VITAMIN D PO) Take 5,000 Units by mouth      denosumab (PROLIA) 60 mg/mL Inject under the skin      diphenoxylate-atropine (LOMOTIL) 2 5-0 025 mg per tablet Take 1 tablet by mouth 2 (two) times a day 60 tablet 2    esomeprazole (NEXIUM) 40 MG capsule Take by mouth      levothyroxine 50 mcg tablet TAKE 1 TABLET BY MOUTH EVERY DAY AS DIRECTED 90 tablet 3    Multiple Vitamins-Minerals (CENTRUM SILVER 50+WOMEN) TABS Take by mouth      oxyCODONE-acetaminophen (PERCOCET) 7 5-325 MG per tablet 1 tablet 3 times a day  0    potassium chloride (MICRO-K) 10 MEQ CR capsule TAKE 2 CAPSULES BY MOUTH TWICE DAILY AS DIRECTED 360 capsule 3  predniSONE 5 mg tablet   0    rosuvastatin (CRESTOR) 20 MG tablet TAKE 1 TABLET BY MOUTH EVERY DAY AS DIRECTED 90 tablet 3    telmisartan-hydrochlorothiazide (MICARDIS HCT) 80-12 5 MG per tablet TAKE 1 TABLET BY MOUTH DAILY 90 tablet 3    zolpidem (AMBIEN) 5 mg tablet Take by mouth       No current facility-administered medications for this visit  Current Outpatient Medications on File Prior to Visit   Medication Sig    baclofen 20 mg tablet TAKE 1 TABLET BY MOUTH FOUR TIMES DAILY    Calcium Carbonate-Vitamin D 600-200 MG-UNIT TABS Take by mouth    celecoxib (CELEBREX) 200 mg capsule Take by mouth    Cholecalciferol (VITAMIN D PO) Take 5,000 Units by mouth    denosumab (PROLIA) 60 mg/mL Inject under the skin    diphenoxylate-atropine (LOMOTIL) 2 5-0 025 mg per tablet Take 1 tablet by mouth 2 (two) times a day    esomeprazole (NEXIUM) 40 MG capsule Take by mouth    levothyroxine 50 mcg tablet TAKE 1 TABLET BY MOUTH EVERY DAY AS DIRECTED    Multiple Vitamins-Minerals (CENTRUM SILVER 50+WOMEN) TABS Take by mouth    oxyCODONE-acetaminophen (PERCOCET) 7 5-325 MG per tablet 1 tablet 3 times a day    potassium chloride (MICRO-K) 10 MEQ CR capsule TAKE 2 CAPSULES BY MOUTH TWICE DAILY AS DIRECTED    predniSONE 5 mg tablet     rosuvastatin (CRESTOR) 20 MG tablet TAKE 1 TABLET BY MOUTH EVERY DAY AS DIRECTED    telmisartan-hydrochlorothiazide (MICARDIS HCT) 80-12 5 MG per tablet TAKE 1 TABLET BY MOUTH DAILY    zolpidem (AMBIEN) 5 mg tablet Take by mouth    [DISCONTINUED] telmisartan (MICARDIS) 80 MG tablet Take 40 mg by mouth       No current facility-administered medications on file prior to visit  She is allergic to aspirin and ibuprofen       Review of Systems   Constitutional: Negative for activity change, appetite change, fatigue and unexpected weight change  Eyes: Negative for visual disturbance  Respiratory: Negative for cough, chest tightness, shortness of breath and wheezing  Cardiovascular: Negative for chest pain, palpitations and leg swelling  Breast: Patient denies tenderness, nipple discharge, masses, or erythema  Gastrointestinal: Negative for abdominal distention, abdominal pain, blood in stool, constipation, diarrhea, nausea and vomiting  Endocrine: Negative for cold intolerance and heat intolerance  Genitourinary: Positive for urgency  Negative for decreased urine volume, difficulty urinating, dyspareunia, dysuria, frequency, hematuria, menstrual problem, pelvic pain, vaginal bleeding, vaginal discharge and vaginal pain  Still able have intercourse 3 to 4 times a week even though she has difficulty walking  Mild urgency and rare urge incontinence   Musculoskeletal: Positive for gait problem  Negative for arthralgias  Skin: Negative for rash  Neurological: Negative for weakness, light-headedness, numbness and headaches  Hematological: Does not bruise/bleed easily  Psychiatric/Behavioral: Negative for agitation, behavioral problems and sleep disturbance  The patient is not nervous/anxious  Objective:    Vitals:    05/03/21 0952   BP: 134/76   Weight: 57 2 kg (126 lb)   Height: 4' 9" (1 448 m)            Physical Exam  Vitals signs and nursing note reviewed  Constitutional:       Appearance: She is well-developed  HENT:      Head: Normocephalic and atraumatic  Eyes:      General: No scleral icterus  Right eye: No discharge  Left eye: No discharge  Extraocular Movements: Extraocular movements intact  Conjunctiva/sclera: Conjunctivae normal    Neck:      Musculoskeletal: Normal range of motion and neck supple  Thyroid: No thyromegaly  Trachea: No tracheal deviation  Cardiovascular:      Rate and Rhythm: Normal rate and regular rhythm  Heart sounds: Normal heart sounds  No murmur  Pulmonary:      Effort: Pulmonary effort is normal  No respiratory distress        Breath sounds: Normal breath sounds  No wheezing  Chest:      Breasts: Breasts are symmetrical          Right: No inverted nipple, mass, nipple discharge, skin change or tenderness  Left: No inverted nipple, mass, nipple discharge, skin change or tenderness  Abdominal:      General: Bowel sounds are normal  There is no distension  Palpations: Abdomen is soft  There is no mass  Tenderness: There is no abdominal tenderness  Genitourinary:     General: Normal vulva  Labia:         Right: No rash, tenderness or lesion  Left: No rash, tenderness or lesion  Vagina: Normal       Adnexa:         Right: No mass, tenderness or fullness  Left: No mass, tenderness or fullness  Rectum: No external hemorrhoid  Comments: Urethral meatus within normal limits  Perineum within normal limits  Bladder well supported  Uterus and cervix surgically removed, physiologic vaginal atrophy  Musculoskeletal: Normal range of motion  Skin:     General: Skin is warm and dry  Neurological:      Mental Status: She is alert and oriented to person, place, and time  Psychiatric:         Behavior: Behavior normal          Thought Content:  Thought content normal          Judgment: Judgment normal

## 2021-05-03 ENCOUNTER — OFFICE VISIT (OUTPATIENT)
Dept: OBGYN CLINIC | Facility: CLINIC | Age: 71
End: 2021-05-03
Payer: MEDICARE

## 2021-05-03 VITALS
DIASTOLIC BLOOD PRESSURE: 76 MMHG | SYSTOLIC BLOOD PRESSURE: 134 MMHG | HEIGHT: 57 IN | BODY MASS INDEX: 27.18 KG/M2 | WEIGHT: 126 LBS

## 2021-05-03 DIAGNOSIS — Z13.820 SCREENING FOR OSTEOPOROSIS: ICD-10-CM

## 2021-05-03 DIAGNOSIS — Z78.0 POST-MENOPAUSAL: ICD-10-CM

## 2021-05-03 DIAGNOSIS — Z12.31 SCREENING MAMMOGRAM, ENCOUNTER FOR: ICD-10-CM

## 2021-05-03 DIAGNOSIS — Z01.419 ENCOUNTER FOR ANNUAL ROUTINE GYNECOLOGICAL EXAMINATION: Primary | ICD-10-CM

## 2021-05-03 PROCEDURE — G0101 CA SCREEN;PELVIC/BREAST EXAM: HCPCS | Performed by: OBSTETRICS & GYNECOLOGY

## 2021-05-17 ENCOUNTER — HOSPITAL ENCOUNTER (OUTPATIENT)
Dept: RADIOLOGY | Facility: HOSPITAL | Age: 71
Discharge: HOME | End: 2021-05-17
Attending: OBSTETRICS & GYNECOLOGY
Payer: MEDICARE

## 2021-05-17 VITALS — HEIGHT: 57 IN | WEIGHT: 126 LBS | BODY MASS INDEX: 27.18 KG/M2

## 2021-05-17 DIAGNOSIS — Z13.820 SCREENING FOR OSTEOPOROSIS: ICD-10-CM

## 2021-05-17 DIAGNOSIS — Z12.31 SCREENING MAMMOGRAM, ENCOUNTER FOR: ICD-10-CM

## 2021-05-17 DIAGNOSIS — Z78.0 POST-MENOPAUSAL: ICD-10-CM

## 2021-05-17 PROCEDURE — 77063 BREAST TOMOSYNTHESIS BI: CPT

## 2021-05-17 PROCEDURE — 77080 DXA BONE DENSITY AXIAL: CPT

## 2021-05-17 PROCEDURE — 77067 SCR MAMMO BI INCL CAD: CPT

## 2021-05-18 ENCOUNTER — TELEPHONE (OUTPATIENT)
Dept: OBGYN CLINIC | Facility: CLINIC | Age: 71
End: 2021-05-18

## 2021-05-18 NOTE — RESULT ENCOUNTER NOTE
Please let Napolean Brothers know that the results indicate  Moderate osteoporosis  There is no comparison to a prior BMD in the network  She should discuss the results with Dr Sue Stubbs who had been treating her with Prolia    LFN T-3 2

## 2021-05-18 NOTE — TELEPHONE ENCOUNTER
----- Message from Jason Byrd MD sent at 5/18/2021  8:19 AM EDT -----  Please let Valerio Story know that the results indicate  Moderate osteoporosis  There is no comparison to a prior BMD in the network  She should discuss the results with Dr Puneet Urias who had been treating her with Prolia    LFN T-3 2

## 2021-05-20 ENCOUNTER — TELEPHONE (OUTPATIENT)
Dept: FAMILY MEDICINE CLINIC | Facility: CLINIC | Age: 71
End: 2021-05-20

## 2021-05-20 NOTE — TELEPHONE ENCOUNTER
Patient called dr Ryan Aguilar wants to make sure you see her dexa scan   --she would like to discuss this with you  She does not want shot only medication    Thank you

## 2021-05-27 ENCOUNTER — OFFICE VISIT (OUTPATIENT)
Dept: FAMILY MEDICINE CLINIC | Facility: CLINIC | Age: 71
End: 2021-05-27
Payer: MEDICARE

## 2021-05-27 VITALS
BODY MASS INDEX: 27.18 KG/M2 | TEMPERATURE: 97.8 F | WEIGHT: 126 LBS | OXYGEN SATURATION: 98 % | SYSTOLIC BLOOD PRESSURE: 110 MMHG | HEIGHT: 57 IN | DIASTOLIC BLOOD PRESSURE: 62 MMHG | HEART RATE: 81 BPM

## 2021-05-27 DIAGNOSIS — D50.9 IRON DEFICIENCY ANEMIA, UNSPECIFIED IRON DEFICIENCY ANEMIA TYPE: ICD-10-CM

## 2021-05-27 DIAGNOSIS — D47.3 ESSENTIAL (HEMORRHAGIC) THROMBOCYTHEMIA (HCC): ICD-10-CM

## 2021-05-27 DIAGNOSIS — D75.839 THROMBOCYTOSIS: ICD-10-CM

## 2021-05-27 DIAGNOSIS — I10 HTN (HYPERTENSION), BENIGN: ICD-10-CM

## 2021-05-27 DIAGNOSIS — R63.8 INCREASED BMI: ICD-10-CM

## 2021-05-27 DIAGNOSIS — M81.0 AGE-RELATED OSTEOPOROSIS WITHOUT CURRENT PATHOLOGICAL FRACTURE: Primary | ICD-10-CM

## 2021-05-27 DIAGNOSIS — Z79.899 POLYPHARMACY: ICD-10-CM

## 2021-05-27 PROCEDURE — 99215 OFFICE O/P EST HI 40 MIN: CPT | Performed by: FAMILY MEDICINE

## 2021-05-27 NOTE — PROGRESS NOTES
Assessment/Plan: 42-year-old female seen with her  in follow-up of recent DEXA scan showing lowest T score -3 2  In hip and femoral neck  She has been taking Prolia for the past 6 years  She wonders if she should continue with? No prior DEXA scans were available for comparison  She was taking calcium but that is on hold per  Consultant  The question now is to continue Prolia or switch to something like Forteo,  Or something else  It is noted patient has been on prednisone 5 mg once daily several months PMR which has been helpful and she is also now discontinued all narcotics  Problem List Items Addressed This Visit        Hematopoietic and Hemostatic    Thrombocytosis (HCC)       Other    Iron deficiency anemia, unspecified    Increased BMI      Other Visit Diagnoses     Age-related osteoporosis without current pathological fracture    -  Primary    The main reason for today's visit is follow-up DEXA scan    Essential (hemorrhagic) thrombocythemia ([address] )        Polypharmacy        HTN (hypertension), benign                Subjective: 73 Year old female, seen with , to discuss recent DEXA scan and coordinate with her many medical disabilities  Need to discuss with rheumatologist today also     Patient ID: Conrado Queen is a 73 y o  female  HPI--73year-old female seen with her  in follow-up of recent DEXA scan showing lowest T score -3 2  In hip and femoral neck  She has been taking Prolia for the past 6 years  She wonders if she should continue with? No prior DEXA scans were available for comparison  She was taking calcium but that is on hold per  Consultant  The question now is to continue Prolia or switch to something like Forteo,  Or something else  It is noted patient has been on prednisone 5 mg once daily several months PMR which has been helpful and she is also now discontinued all narcotics      The following portions of the patient's history were reviewed and updated as appropriate:   Past Medical History:  She has a past medical history of Arthritis, Hernia, umbilical, Hypertension, and Spinal stenosis  ,  _______________________________________________________________________  Medical Problems:  does not have any pertinent problems on file ,  _______________________________________________________________________  Past Surgical History:   has a past surgical history that includes Hysterectomy; Foot surgery; Hernia repair; Tonsillectomy; Mammo (historical) (2017); and Breast cyst excision (Left, 1998)  ,  _______________________________________________________________________  Family History:  family history includes Breast cancer in her maternal grandmother; Cancer in her father; Colon cancer (age of onset: 62) in her father; Hypertension in her mother; No Known Problems in her maternal aunt, maternal aunt, maternal aunt, maternal aunt, paternal aunt, paternal aunt, sister, and sister  ,  _______________________________________________________________________  Social History:   reports that she has never smoked  She has never used smokeless tobacco  She reports that she does not drink alcohol or use drugs  ,  _______________________________________________________________________  Allergies:  is allergic to aspirin and ibuprofen     _______________________________________________________________________  Current Outpatient Medications   Medication Sig Dispense Refill    baclofen 20 mg tablet TAKE 1 TABLET BY MOUTH FOUR TIMES DAILY 120 tablet 6    Calcium Carbonate-Vitamin D 600-200 MG-UNIT TABS Take by mouth      celecoxib (CELEBREX) 200 mg capsule Take by mouth      Cholecalciferol (VITAMIN D PO) Take 5,000 Units by mouth      denosumab (PROLIA) 60 mg/mL Inject under the skin      diphenoxylate-atropine (LOMOTIL) 2 5-0 025 mg per tablet Take 1 tablet by mouth 2 (two) times a day 60 tablet 2    esomeprazole (NEXIUM) 40 MG capsule Take by mouth      levothyroxine 50 mcg tablet TAKE 1 TABLET BY MOUTH EVERY DAY AS DIRECTED 90 tablet 3    Multiple Vitamins-Minerals (CENTRUM SILVER 50+WOMEN) TABS Take by mouth      oxyCODONE-acetaminophen (PERCOCET) 7 5-325 MG per tablet 1 tablet 3 times a day  0    potassium chloride (MICRO-K) 10 MEQ CR capsule TAKE 2 CAPSULES BY MOUTH TWICE DAILY AS DIRECTED 360 capsule 3    predniSONE 5 mg tablet   0    rosuvastatin (CRESTOR) 20 MG tablet TAKE 1 TABLET BY MOUTH EVERY DAY AS DIRECTED 90 tablet 3    telmisartan-hydrochlorothiazide (MICARDIS HCT) 80-12 5 MG per tablet TAKE 1 TABLET BY MOUTH DAILY 90 tablet 3    zolpidem (AMBIEN) 5 mg tablet Take by mouth       No current facility-administered medications for this visit       _______________________________________________________________________  Review of Systems   Constitutional: Negative for activity change (in WC), appetite change, chills, diaphoresis, fatigue and fever  HENT: Negative for congestion, ear discharge, ear pain, facial swelling, nosebleeds, sore throat, tinnitus, trouble swallowing and voice change  Eyes: Negative for photophobia, pain, discharge, redness, itching and visual disturbance  Respiratory: Negative for apnea, cough, choking, chest tightness and shortness of breath  Cardiovascular: Negative for chest pain, palpitations and leg swelling  Denies any and all cardiac symptoms   Gastrointestinal: Negative for abdominal distention, abdominal pain, blood in stool, constipation, diarrhea, nausea and vomiting  Endocrine: Negative for cold intolerance, heat intolerance, polydipsia, polyphagia and polyuria  Genitourinary: Negative for decreased urine volume, difficulty urinating, dysuria, enuresis, frequency, hematuria, pelvic pain, urgency and vaginal bleeding  Musculoskeletal: Positive for arthralgias, back pain, gait problem and myalgias  Negative for joint swelling, neck pain and neck stiffness          Severe scoliosis with kyphosis throughout spine especially lumbar spine  History of PMR on prednisone  Sees Dr Citlali Arvizu and I will call him now and f/u   Skin: Negative for color change, pallor and rash  Allergic/Immunologic: Negative for immunocompromised state  Neurological: Negative for dizziness, seizures, facial asymmetry, light-headedness, numbness and headaches  Hematological: Negative for adenopathy  Psychiatric/Behavioral: Negative for agitation, behavioral problems, confusion, decreased concentration, dysphoric mood and hallucinations  Objective:  Vitals:    05/27/21 1654   BP: 110/62   BP Location: Left arm   Patient Position: Sitting   Cuff Size: Standard   Pulse: 81   Temp: 97 8 °F (36 6 °C)   TempSrc: Tympanic   SpO2: 98%   Weight: 57 2 kg (126 lb)   Height: 4' 9" (1 448 m)     Body mass index is 27 27 kg/m²  Physical Exam  Vitals signs and nursing note reviewed  Constitutional:       General: She is not in acute distress  Appearance: Normal appearance  She is well-developed  She is obese  She is not ill-appearing, toxic-appearing or diaphoretic  HENT:      Head: Normocephalic and atraumatic  Nose: Nose normal    Eyes:      General: No scleral icterus  Right eye: No discharge  Left eye: No discharge  Conjunctiva/sclera: Conjunctivae normal       Pupils: Pupils are equal, round, and reactive to light  Neck:      Musculoskeletal: Normal range of motion and neck supple  Thyroid: No thyromegaly  Trachea: No tracheal deviation  Cardiovascular:      Rate and Rhythm: Normal rate and regular rhythm  Heart sounds: Normal heart sounds  Pulmonary:      Effort: No respiratory distress  Breath sounds: Normal breath sounds  No wheezing or rales  Chest:      Chest wall: No tenderness  Abdominal:      Palpations: Abdomen is soft  Musculoskeletal: Normal range of motion  General: No tenderness or deformity  Right lower leg: No edema  Left lower leg: No edema  Comments: Patient with many many years severe kyphoscoliosis lumbar spine  Cannot ambulate without walker and review of rheumatologist notes suggest impending surgery needed  Skin:     General: Skin is warm and dry  Coloration: Skin is not pale  Findings: No erythema or rash  Neurological:      General: No focal deficit present  Mental Status: She is alert and oriented to person, place, and time  Cranial Nerves: No cranial nerve deficit  Gait: Gait abnormal       Deep Tendon Reflexes: Reflexes abnormal    Psychiatric:         Mood and Affect: Mood normal          Behavior: Behavior normal          Thought Content: Thought content normal          Judgment: Judgment normal          NOTE: all of the above issues discussed include chronic illness(es)  with severe exacerbations OR pose threats to life or body function if not managed/monitored effectively  These include, but not limited to, thrombocytosis, kyphoscoliosis, severe osteoporosis, hypertension, iron deficiency anemia, and PMR with prednisone use I am as concerned about the long term effects of these disease states, as much as the short term effects  Obviously, the long-term affects can be devastating as there are multiple systems involved as well as advanced age at 73  She is fairly inactive and un mobile due to her musculoskeletal debility I spent considerable time assuring that my patient  Understands  Additionally I am telephoning Dr Ethan Chin as I write this  Will discuss next plans with him and coordinate care  She is not due to see him until August  I reviewed prior internal and external notes,  consults,  hospital discharges,  and any other appropriate documents  I  have discussed the management and interpretation of them as well  Again, patient expresses understanding

## 2021-05-29 NOTE — PATIENT INSTRUCTIONS
Osteoporosis   AMBULATORY CARE:   Osteoporosis  is a long-term medical condition that causes your bones to become weak, brittle, and more likely to fracture  Osteoporosis occurs when your body absorbs more bone than it makes  It is also caused by a lack of calcium and estrogen (female hormone)  Common symptoms include the following: You may not have any signs or symptoms  You may break a bone after a muscle strain, bump, or fall  A break usually occurs in the hip, spine, or wrist  A collapsed vertebra (bone in your spine) may cause severe back pain or loss of height from bent posture  Call your doctor if:   · You have severe pain  · You have increasing pain after a fall  · You have pain when you do your daily activities  · You have questions or concerns about your condition or care  Diagnosis of osteoporosis:   · Blood and urine tests  measure your calcium, vitamin D, and estrogen levels  · An x-ray or CT  may show thinned bones or a fracture  You may be given contrast liquid to help the bones show up better in the pictures  Tell the healthcare provider if you have ever had an allergic reaction to contrast liquid  Do not enter the MRI room with anything metal  Metal can cause serious injury  Tell the healthcare provider if you have any metal in or on your body  · A bone density test  compares your bone thickness with what is expected for someone of your age, gender, and ethnicity  Treatment for osteoporosis  may include medicines to prevent bone loss, build new bone, and increase estrogen  These medicines help prevent fractures and may be given as a pill or injection  Ask your healthcare provider for more information on these medicines  Prevent bone loss:   · Eat healthy foods that are high in calcium  This helps keep your bones strong  Good sources of calcium are milk, cheese, broccoli, tofu, almonds, and canned salmon and sardines  · Increase your vitamin D intake    Vitamin D is in fish oils, some vegetables, and fortified milk, cereal, and bread  Vitamin D is also formed in the skin when it is exposed to the sun  Ask your healthcare provider how much sunlight is safe for you  · Drink liquids as directed  Ask your healthcare provider how much liquid to drink each day and which liquids are best for you  Do not have alcohol or caffeine  They decrease bone mineral density, which can weaken your bones  · Exercise regularly  Ask your healthcare provider about the best exercise plan for you  Weight bearing exercise for 30 minutes, 3 times a week can help build and strengthen bone  · Do not smoke  Nicotine and other chemicals in cigarettes and cigars can cause lung damage  Ask your healthcare provider for information if you currently smoke and need help to quit  E-cigarettes or smokeless tobacco still contain nicotine  Talk to your healthcare provider before you use these products  · Go to physical therapy as directed  A physical therapist teaches you exercises to help improve movement and muscle strength  Follow up with your doctor as directed:  Write down your questions so you remember to ask them during your visits  © Copyright [address] Information is for End User's use only and may not be sold, redistributed or otherwise used for commercial purposes  All illustrations and images included in CareNotes® are the copyrighted property of [employer]  or [address] [place], [place] [address]   The above information is an  only  It is not intended as medical advice for individual conditions or treatments  Talk to your doctor, nurse or pharmacist before following any medical regimen to see if it is safe and effective for you

## 2021-06-05 DIAGNOSIS — M62.838 MUSCLE SPASM OF BOTH LOWER LEGS: ICD-10-CM

## 2021-06-05 RX ORDER — BACLOFEN 20 MG/1
TABLET ORAL
Qty: 120 TABLET | Refills: 6 | Status: SHIPPED | OUTPATIENT
Start: 2021-06-05 | End: 2022-01-03

## 2021-06-07 ENCOUNTER — OFFICE VISIT (OUTPATIENT)
Dept: ENDOCRINOLOGY | Facility: CLINIC | Age: 71
End: 2021-06-07
Payer: MEDICARE

## 2021-06-07 VITALS
BODY MASS INDEX: 27.21 KG/M2 | HEART RATE: 62 BPM | WEIGHT: 126.13 LBS | SYSTOLIC BLOOD PRESSURE: 158 MMHG | HEIGHT: 57 IN | DIASTOLIC BLOOD PRESSURE: 90 MMHG

## 2021-06-07 DIAGNOSIS — E03.8 HYPOTHYROIDISM DUE TO HASHIMOTO'S THYROIDITIS: ICD-10-CM

## 2021-06-07 DIAGNOSIS — I10 ESSENTIAL HYPERTENSION: ICD-10-CM

## 2021-06-07 DIAGNOSIS — M89.9 DISORDER OF BONE, UNSPECIFIED: ICD-10-CM

## 2021-06-07 DIAGNOSIS — E83.52 HYPERCALCEMIA: Primary | ICD-10-CM

## 2021-06-07 DIAGNOSIS — E06.3 HYPOTHYROIDISM DUE TO HASHIMOTO'S THYROIDITIS: ICD-10-CM

## 2021-06-07 PROCEDURE — 99204 OFFICE O/P NEW MOD 45 MIN: CPT | Performed by: INTERNAL MEDICINE

## 2021-06-07 RX ORDER — LEVOTHYROXINE SODIUM 50 MCG
50 TABLET ORAL DAILY
COMMUNITY
End: 2021-11-08 | Stop reason: SDUPTHER

## 2021-06-07 RX ORDER — TELMISARTAN 80 MG/1
80 TABLET ORAL DAILY
Qty: 30 TABLET | Refills: 3 | Status: SHIPPED | OUTPATIENT
Start: 2021-06-07 | End: 2021-06-08 | Stop reason: SDUPTHER

## 2021-06-07 NOTE — PATIENT INSTRUCTIONS
Please get labs done in 2 weeks after stopping current Mycardis and using Telmisartan  24 hr urine: on the day you wish to start collecting urine, discard the first morning sample and then start collecting all the rest of urine into a bottle through the next 24 hrs  Finish by collecting the first morning sample into the bottle and then STOP  Take the bottle to lab

## 2021-06-07 NOTE — PROGRESS NOTES
New Patient Note      CC: hyperclacemia    History of Present Illness:   73 yr female with hypothyroidism, HTN, HLD, DJD spine, osteoporosis on prolia for 7 years, PMR, Anemia, Essential thrombocytosis and obesity  She reports no recent falls or fractures  Last prolia injection 12/2020  She uses a rollater walker and has flexion lumbar spine deformity due to spinal stenosis  Since last lab showing hypercalcemia, she has been off calcium and vitamin D  She continues to take telmisartan-HCTZ  Patient Active Problem List   Diagnosis    Thrombocytosis (HCC)    Anemia    Iron deficiency anemia, unspecified    Spinal stenosis    Hypertension    Polymyalgia rheumatica (HCC)    Arthritis    Hernia, umbilical    Irritable bowel syndrome with diarrhea    Increased BMI     Past Medical History:   Diagnosis Date    Arthritis     Hernia, umbilical     Hypertension     Spinal stenosis       Past Surgical History:   Procedure Laterality Date    BREAST CYST EXCISION Left 1998    FOOT SURGERY      Toe    HERNIA REPAIR      HYSTERECTOMY      MAMMO (HISTORICAL)  2017    TONSILLECTOMY        Family History   Problem Relation Age of Onset    Hypertension Mother     Cancer Father     Colon cancer Father 62    No Known Problems Sister     Breast cancer Maternal Grandmother         not sure of her age of onset   Jullie Liming No Known Problems Sister     No Known Problems Maternal Aunt     No Known Problems Maternal Aunt     No Known Problems Maternal Aunt     No Known Problems Maternal Aunt     No Known Problems Paternal Aunt     No Known Problems Paternal Aunt      Social History     Tobacco Use    Smoking status: Never Smoker    Smokeless tobacco: Never Used   Substance Use Topics    Alcohol use: No     Allergies   Allergen Reactions    Aspirin     Ibuprofen        Review of Systems   Constitutional: Positive for fatigue  HENT: Negative  Eyes: Negative  Respiratory: Negative      Cardiovascular: Negative  Gastrointestinal: Negative  Endocrine: Negative  Musculoskeletal: Negative  Skin: Negative  Allergic/Immunologic: Negative  Neurological: Negative  Hematological: Negative  Psychiatric/Behavioral: Negative  Current Outpatient Medications:     baclofen 20 mg tablet, TAKE 1 TABLET BY MOUTH FOUR TIMES DAILY, Disp: 120 tablet, Rfl: 6    celecoxib (CELEBREX) 200 mg capsule, Take by mouth, Disp: , Rfl:     Cholecalciferol (VITAMIN D PO), Take 5,000 Units by mouth, Disp: , Rfl:     denosumab (PROLIA) 60 mg/mL, Inject under the skin, Disp: , Rfl:     esomeprazole (NEXIUM) 40 MG capsule, Take by mouth, Disp: , Rfl:     Ferrous Sulfate (IRON PO), Take 65 mg by mouth daily, Disp: , Rfl:     potassium chloride (MICRO-K) 10 MEQ CR capsule, TAKE 2 CAPSULES BY MOUTH TWICE DAILY AS DIRECTED, Disp: 360 capsule, Rfl: 3    rosuvastatin (CRESTOR) 20 MG tablet, TAKE 1 TABLET BY MOUTH EVERY DAY AS DIRECTED, Disp: 90 tablet, Rfl: 3    Synthroid 50 MCG tablet, Take 50 mcg by mouth daily, Disp: , Rfl:     telmisartan-hydrochlorothiazide (MICARDIS HCT) 80-12 5 MG per tablet, TAKE 1 TABLET BY MOUTH DAILY, Disp: 90 tablet, Rfl: 3    zolpidem (AMBIEN) 5 mg tablet, Take by mouth, Disp: , Rfl:     Calcium Carbonate-Vitamin D 600-200 MG-UNIT TABS, Take by mouth, Disp: , Rfl:     diphenoxylate-atropine (LOMOTIL) 2 5-0 025 mg per tablet, Take 1 tablet by mouth 2 (two) times a day (Patient not taking: Reported on 6/7/2021), Disp: 60 tablet, Rfl: 2    levothyroxine 50 mcg tablet, TAKE 1 TABLET BY MOUTH EVERY DAY AS DIRECTED (Patient not taking: Reported on 6/7/2021), Disp: 90 tablet, Rfl: 3    Multiple Vitamins-Minerals (CENTRUM SILVER 50+WOMEN) TABS, Take by mouth, Disp: , Rfl:     oxyCODONE-acetaminophen (PERCOCET) 7 5-325 MG per tablet, 1 tablet 3 times a day, Disp: , Rfl: 0    predniSONE 5 mg tablet, , Disp: , Rfl: 0    Physical Exam:  Body mass index is 27 29 kg/m²    /90 (BP Location: Left arm, Patient Position: Sitting, Cuff Size: Large)   Pulse 62   Ht 4' 9" (1 448 m)   Wt 57 2 kg (126 lb 2 oz)   BMI 27 29 kg/m²       Physical Exam  Constitutional:       Appearance: She is well-developed  HENT:      Head: Normocephalic  Eyes:      Pupils: Pupils are equal, round, and reactive to light  Neck:      Musculoskeletal: Normal range of motion  Thyroid: No thyromegaly  Cardiovascular:      Rate and Rhythm: Normal rate  Heart sounds: Normal heart sounds  Pulmonary:      Effort: Pulmonary effort is normal       Breath sounds: Normal breath sounds  Abdominal:      General: Bowel sounds are normal       Palpations: Abdomen is soft  Musculoskeletal:         General: No deformity  Skin:     Capillary Refill: Capillary refill takes less than 2 seconds  Coloration: Skin is not pale  Findings: No rash  Neurological:      Mental Status: She is alert and oriented to person, place, and time  Labs:     Lab Results   Component Value Date    [id number] 2 345 03/19/2021       Lab Results   Component Value Date    CREATININE 0 87 03/30/2021    CREATININE 0 90 03/19/2021    CREATININE 0 84 06/17/2020    BUN 19 03/19/2021    K 4 0 03/19/2021     03/19/2021    CO2 28 03/19/2021     eGFR   Date Value Ref Range Status   03/30/2021 68 ml/min/1 73sq m Final       Lab Results   Component Value Date    ALT 21 03/19/2021    AST 14 03/19/2021    ALKPHOS 130 (H) 03/19/2021       Lab Results   Component Value Date    CHOLESTEROL 176 03/19/2021     Lab Results   Component Value Date    HDL 73 03/19/2021     Lab Results   Component Value Date    TRIG 66 03/19/2021     Lab Results   Component Value Date    NONHDLC 103 03/19/2021         Impression:  1  Hypercalcemia    2  Disorder of bone, unspecified     3  Essential hypertension    4  Hypothyroidism due to Hashimoto's thyroiditis         Plan:    Diagnoses and all orders for this visit:    Hypercalcemia   Multiple differentials are possible including immobility, HCTZ use, primary hyperparathyroidism, PTHrP mediated or granulomatous disease  Note calcium might be partially corrected with prolia use  Today, principles of basic calcium physiology and differentials were explained to patient  Advised her to take a balanced diet and vitamin D3 2000IU daily  May hold calcium supplements for now  Advised propermethodology for 24 hr urine collection  Suggested physical therapy  Will check bone markers as listed in 2 weeks after stopping HCTZ  Based on the data, will give further recommendations  Telmisartan Rx was done  Follow up in 2 months     -     Calcium; Future  -     Comprehensive metabolic panel; Future  -     Phosphorus; Future  -     NTX Panel, Urine  -     PTH, intact; Future  -     Protein electrophoresis, urine  -     Protein electrophoresis, serum; Future  -     Vitamin D 25 hydroxy; Future  -     C-Telopeptide; Future  -     T4, free; Future  -     TSH, 3rd generation; Future  -     Vitamin D 1,25 dihydroxy; Future  -     PTH-related peptide; Future  -     Angiotensin converting enzyme; Future    Hypothyroidism  Check functions  She seems clinically euthyroid   -     NTX Panel, Urine    Disorder of bone, unspecified   -     C-Telopeptide; Future    Essential hypertension  -     Calcium, urine, 24 hour; Future  -     telmisartan (MICARDIS) 80 MG tablet; Take 1 tablet (80 mg total) by mouth daily      I have spent 50 minutes with patient today in which greater than 50% of this time was spent in counseling/coordination of care  All questioned fully answered  She will call me if any problems arise  Educated/ Counseled patient on diagnostic test results, prognosis, risk vs benefit of treatment options, importance of treatment compliance, healthy life and lifestyle choices        [address]

## 2021-06-08 DIAGNOSIS — I10 ESSENTIAL HYPERTENSION: ICD-10-CM

## 2021-06-09 RX ORDER — TELMISARTAN 80 MG/1
80 TABLET ORAL DAILY
Qty: 90 TABLET | Refills: 1 | Status: SHIPPED | OUTPATIENT
Start: 2021-06-09 | End: 2022-02-01 | Stop reason: SDUPTHER

## 2021-06-22 ENCOUNTER — APPOINTMENT (OUTPATIENT)
Dept: LAB | Facility: CLINIC | Age: 71
End: 2021-06-22
Payer: MEDICARE

## 2021-06-22 DIAGNOSIS — E83.52 HYPERCALCEMIA: ICD-10-CM

## 2021-06-22 DIAGNOSIS — M89.9 DISORDER OF BONE, UNSPECIFIED: ICD-10-CM

## 2021-06-22 DIAGNOSIS — I10 ESSENTIAL HYPERTENSION: ICD-10-CM

## 2021-06-22 LAB
25(OH)D3 SERPL-MCNC: 25.9 NG/ML (ref 30–100)
ALBUMIN SERPL BCP-MCNC: 3.3 G/DL (ref 3.5–5)
ALP SERPL-CCNC: 117 U/L (ref 46–116)
ALT SERPL W P-5'-P-CCNC: 23 U/L (ref 12–78)
ANION GAP SERPL CALCULATED.3IONS-SCNC: 12 MMOL/L (ref 4–13)
AST SERPL W P-5'-P-CCNC: 20 U/L (ref 5–45)
BILIRUB SERPL-MCNC: 0.7 MG/DL (ref 0.2–1)
BUN SERPL-MCNC: 17 MG/DL (ref 5–25)
CALCIUM 24H UR-MCNC: 95.2 MG/24 HRS (ref 42–353)
CALCIUM ALBUM COR SERPL-MCNC: 10.3 MG/DL (ref 8.3–10.1)
CALCIUM SERPL-MCNC: 9.7 MG/DL (ref 8.3–10.1)
CHLORIDE SERPL-SCNC: 106 MMOL/L (ref 100–108)
CO2 SERPL-SCNC: 23 MMOL/L (ref 21–32)
CREAT SERPL-MCNC: 0.73 MG/DL (ref 0.6–1.3)
GFR SERPL CREATININE-BSD FRML MDRD: 84 ML/MIN/1.73SQ M
GLUCOSE P FAST SERPL-MCNC: 90 MG/DL (ref 65–99)
PHOSPHATE SERPL-MCNC: 4.4 MG/DL (ref 2.3–4.1)
POTASSIUM SERPL-SCNC: 3.9 MMOL/L (ref 3.5–5.3)
PROT SERPL-MCNC: 7 G/DL (ref 6.4–8.2)
PTH-INTACT SERPL-MCNC: 31.4 PG/ML (ref 18.4–80.1)
SODIUM SERPL-SCNC: 141 MMOL/L (ref 136–145)
SPECIMEN VOL UR: 1400 ML
T4 FREE SERPL-MCNC: 1.19 NG/DL (ref 0.76–1.46)
TSH SERPL DL<=0.05 MIU/L-ACNC: 3.04 UIU/ML (ref 0.36–3.74)

## 2021-06-22 PROCEDURE — 82340 ASSAY OF CALCIUM IN URINE: CPT

## 2021-06-22 PROCEDURE — 82397 CHEMILUMINESCENT ASSAY: CPT

## 2021-06-22 PROCEDURE — 82164 ANGIOTENSIN I ENZYME TEST: CPT

## 2021-06-22 PROCEDURE — 84443 ASSAY THYROID STIM HORMONE: CPT

## 2021-06-22 PROCEDURE — 84100 ASSAY OF PHOSPHORUS: CPT

## 2021-06-22 PROCEDURE — 84165 PROTEIN E-PHORESIS SERUM: CPT

## 2021-06-22 PROCEDURE — 82652 VIT D 1 25-DIHYDROXY: CPT

## 2021-06-22 PROCEDURE — 82523 COLLAGEN CROSSLINKS: CPT | Performed by: INTERNAL MEDICINE

## 2021-06-22 PROCEDURE — 82306 VITAMIN D 25 HYDROXY: CPT

## 2021-06-22 PROCEDURE — 80053 COMPREHEN METABOLIC PANEL: CPT

## 2021-06-22 PROCEDURE — 83970 ASSAY OF PARATHORMONE: CPT

## 2021-06-22 PROCEDURE — 36415 COLL VENOUS BLD VENIPUNCTURE: CPT

## 2021-06-22 PROCEDURE — 84439 ASSAY OF FREE THYROXINE: CPT

## 2021-06-22 PROCEDURE — 82570 ASSAY OF URINE CREATININE: CPT | Performed by: INTERNAL MEDICINE

## 2021-06-22 PROCEDURE — 84166 PROTEIN E-PHORESIS/URINE/CSF: CPT | Performed by: INTERNAL MEDICINE

## 2021-06-23 LAB
ACE SERPL-CCNC: 52 U/L (ref 14–82)
ALBUMIN SERPL ELPH-MCNC: 3.98 G/DL (ref 3.5–5)
ALBUMIN SERPL ELPH-MCNC: 56.1 % (ref 52–65)
ALBUMIN UR ELPH-MCNC: 100 %
ALPHA1 GLOB MFR UR ELPH: 0 %
ALPHA1 GLOB SERPL ELPH-MCNC: 0.42 G/DL (ref 0.1–0.4)
ALPHA1 GLOB SERPL ELPH-MCNC: 5.9 % (ref 2.5–5)
ALPHA2 GLOB MFR UR ELPH: 0 %
ALPHA2 GLOB SERPL ELPH-MCNC: 1.04 G/DL (ref 0.4–1.2)
ALPHA2 GLOB SERPL ELPH-MCNC: 14.7 % (ref 7–13)
B-GLOBULIN MFR UR ELPH: 0 %
BETA GLOB ABNORMAL SERPL ELPH-MCNC: 0.41 G/DL (ref 0.4–0.8)
BETA1 GLOB SERPL ELPH-MCNC: 5.8 % (ref 5–13)
BETA2 GLOB SERPL ELPH-MCNC: 6.1 % (ref 2–8)
BETA2+GAMMA GLOB SERPL ELPH-MCNC: 0.43 G/DL (ref 0.2–0.5)
GAMMA GLOB ABNORMAL SERPL ELPH-MCNC: 0.81 G/DL (ref 0.5–1.6)
GAMMA GLOB MFR UR ELPH: 0 %
GAMMA GLOB SERPL ELPH-MCNC: 11.4 % (ref 12–22)
IGG/ALB SER: 1.28 {RATIO} (ref 1.1–1.8)
PROT PATTERN SERPL ELPH-IMP: ABNORMAL
PROT PATTERN UR ELPH-IMP: NORMAL
PROT SERPL-MCNC: 7.1 G/DL (ref 6.4–8.2)
PROT UR-MCNC: 6 MG/DL

## 2021-06-24 LAB
1,25(OH)2D3 SERPL-MCNC: 38 PG/ML (ref 19.9–79.3)
COLLAGEN NTX UR-SCNC: 209 NMOL BCE
COLLAGEN NTX/CREAT UR-SRTO: 162 NM BCE/MM CR (ref 0–89)
CREAT UR-MCNC: 14.6 MG/DL
INTERPRETIVE GUIDE:: ABNORMAL

## 2021-06-26 LAB — COLLAGEN CTX SERPL-MCNC: 1676 PG/ML

## 2021-06-29 LAB — PTH RELATED PROT SERPL-SCNC: <2 PMOL/L

## 2021-06-30 ENCOUNTER — TELEPHONE (OUTPATIENT)
Dept: ENDOCRINOLOGY | Facility: CLINIC | Age: 71
End: 2021-06-30

## 2021-06-30 DIAGNOSIS — E83.52 HYPERCALCEMIA: Primary | ICD-10-CM

## 2021-06-30 NOTE — TELEPHONE ENCOUNTER
Called pt and updated labs  She has mild hypercalcemia unrelated to PTH(chronic inflammation vs immobility)  with increased bone markers turnover but nomral PTH/PTHrp/vit D levels  Given her Hx, this is most consistent with bone metabolism after stopping prolia  Was on prolia for 7 years  Last dose was 12/2020    Will see Dr Loy Hull on 7/12/21  She might benefit from a combination of anabolic agent and prolia  Will repeat Ca, albumin, PTH, vit D and phosphorus in 3-4 months

## 2021-07-26 ENCOUNTER — OFFICE VISIT (OUTPATIENT)
Dept: FAMILY MEDICINE CLINIC | Facility: CLINIC | Age: 71
End: 2021-07-26
Payer: MEDICARE

## 2021-07-26 VITALS
OXYGEN SATURATION: 98 % | WEIGHT: 126.5 LBS | HEART RATE: 72 BPM | TEMPERATURE: 96.4 F | DIASTOLIC BLOOD PRESSURE: 68 MMHG | RESPIRATION RATE: 18 BRPM | SYSTOLIC BLOOD PRESSURE: 126 MMHG | HEIGHT: 57 IN | BODY MASS INDEX: 27.29 KG/M2

## 2021-07-26 DIAGNOSIS — M81.0 OSTEOPOROSIS, UNSPECIFIED OSTEOPOROSIS TYPE, UNSPECIFIED PATHOLOGICAL FRACTURE PRESENCE: Primary | ICD-10-CM

## 2021-07-26 DIAGNOSIS — Z79.899 POLYPHARMACY: ICD-10-CM

## 2021-07-26 DIAGNOSIS — Z79.899 ENCOUNTER FOR LONG-TERM (CURRENT) USE OF MEDICATIONS: ICD-10-CM

## 2021-07-26 DIAGNOSIS — I10 HTN (HYPERTENSION), BENIGN: ICD-10-CM

## 2021-07-26 DIAGNOSIS — M19.019 ARTHRITIS OF SHOULDER: ICD-10-CM

## 2021-07-26 DIAGNOSIS — D47.3 ESSENTIAL (HEMORRHAGIC) THROMBOCYTHEMIA (HCC): ICD-10-CM

## 2021-07-26 PROCEDURE — 99214 OFFICE O/P EST MOD 30 MIN: CPT | Performed by: FAMILY MEDICINE

## 2021-07-26 NOTE — PATIENT INSTRUCTIONS
Osteoporosis   AMBULATORY CARE:   Osteoporosis  is a long-term medical condition that causes your bones to become weak, brittle, and more likely to fracture  Osteoporosis occurs when your body absorbs more bone than it makes  It is also caused by a lack of calcium and estrogen (female hormone)  Common symptoms include the following: You may not have any signs or symptoms  You may break a bone after a muscle strain, bump, or fall  A break usually occurs in the hip, spine, or wrist  A collapsed vertebra (bone in your spine) may cause severe back pain or loss of height from bent posture  Call your doctor if:   · You have severe pain  · You have increasing pain after a fall  · You have pain when you do your daily activities  · You have questions or concerns about your condition or care  Diagnosis of osteoporosis:   · Blood and urine tests  measure your calcium, vitamin D, and estrogen levels  · An x-ray or CT  may show thinned bones or a fracture  You may be given contrast liquid to help the bones show up better in the pictures  Tell the healthcare provider if you have ever had an allergic reaction to contrast liquid  Do not enter the MRI room with anything metal  Metal can cause serious injury  Tell the healthcare provider if you have any metal in or on your body  · A bone density test  compares your bone thickness with what is expected for someone of your age, gender, and ethnicity  Treatment for osteoporosis  may include medicines to prevent bone loss, build new bone, and increase estrogen  These medicines help prevent fractures and may be given as a pill or injection  Ask your healthcare provider for more information on these medicines  Prevent bone loss:       · Eat healthy foods that are high in calcium  This helps keep your bones strong  Good sources of calcium are milk, cheese, broccoli, tofu, almonds, and canned salmon and sardines  · Increase your vitamin D intake    Vitamin D is in fish oils, some vegetables, and fortified milk, cereal, and bread  Vitamin D is also formed in the skin when it is exposed to the sun  Ask your healthcare provider how much sunlight is safe for you  · Drink liquids as directed  Ask your healthcare provider how much liquid to drink each day and which liquids are best for you  Do not have alcohol or caffeine  They decrease bone mineral density, which can weaken your bones  · Exercise regularly  Ask your healthcare provider about the best exercise plan for you  Weight bearing exercise for 30 minutes, 3 times a week can help build and strengthen bone  · Do not smoke  Nicotine and other chemicals in cigarettes and cigars can cause lung damage  Ask your healthcare provider for information if you currently smoke and need help to quit  E-cigarettes or smokeless tobacco still contain nicotine  Talk to your healthcare provider before you use these products  · Go to physical therapy as directed  A physical therapist teaches you exercises to help improve movement and muscle strength  Follow up with your doctor as directed:  Write down your questions so you remember to ask them during your visits  © Copyright [employer] 2021 Information is for End User's use only and may not be sold, redistributed or otherwise used for commercial purposes  All illustrations and images included in CareNotes® are the copyrighted property of [employer] D [employer] M [employer] Inc  or [facility] Starla Conway   The above information is an  only  It is not intended as medical advice for individual conditions or treatments  Talk to your doctor, nurse or pharmacist before following any medical regimen to see if it is safe and effective for you

## 2021-07-26 NOTE — PROGRESS NOTES
· Assessment/Plan: 74 yo female, seen with , well-known to me for many years presents for f/u and evaluation of the following medical issues:     · F/u and eval R shoulder DJD - needs refer to Dr Claudine johnston     · F/u and eval HTN:  Controlled - endo d/c the HCTZ and on Micardis only    · F/u and eval NIDDM: no issue here    · F/u and eval HLD: on Crestor 20 mg OD    · F/u and eval deconditioning: is not able to do physical things due to spine and CP (mild)    · F/u and eval polypharmacy: all meds discussed and reviewed  Problem List Items Addressed This Visit     None      Visit Diagnoses     Osteoporosis, unspecified osteoporosis type, unspecified pathological fracture presence    -  Primary    Relevant Medications    denosumab (PROLIA) subcutaneous injection 60 mg (Completed)            Subjective:  74 yo female in NAD, with multiple medical issues     Patient ID: Stuart Harp is a 73 y o  female  · HPI--Assessment/Plan: 74 yo female, seen with , well-known to me for many years presents for f/u and evaluation of the following medical issues:     · F/u and eval R shoulder DJD - needs refer to Dr Claudine johnston     · F/u and eval HTN:  Controlled - endo d/c the HCTZ and on Micardis only    · F/u and eval NIDDM: no issue here    · F/u and eval HLD: on Crestor 20 mg OD    · F/u and eval deconditioning: is not able to do physical things due to spine and CP (mild)    F/u and eval polypharmacy: all meds discussed and reviewed  The following portions of the patient's history were reviewed and updated as appropriate:   Past Medical History:  She has a past medical history of Arthritis, Hernia, umbilical, Hypertension, and Spinal stenosis  ,  _______________________________________________________________________  Medical Problems:  does not have any pertinent problems on file ,  _______________________________________________________________________  Past Surgical History:   has a past surgical history that includes Hysterectomy; Foot surgery; Hernia repair; Tonsillectomy; Mammo (historical) (2017); and Breast cyst excision (Left, 1998)  ,  _______________________________________________________________________  Family History:  family history includes Breast cancer in her maternal grandmother; Cancer in her father; Colon cancer (age of onset: 62) in her father; Hypertension in her mother; No Known Problems in her maternal aunt, maternal aunt, maternal aunt, maternal aunt, paternal aunt, paternal aunt, sister, and sister  ,  _______________________________________________________________________  Social History:   reports that she has never smoked  She has never used smokeless tobacco  She reports that she does not drink alcohol and does not use drugs  ,  _______________________________________________________________________  Allergies:  is allergic to aspirin and ibuprofen     _______________________________________________________________________  Current Outpatient Medications   Medication Sig Dispense Refill    baclofen 20 mg tablet TAKE 1 TABLET BY MOUTH FOUR TIMES DAILY 120 tablet 6    Calcium Carbonate-Vitamin D 600-200 MG-UNIT TABS Take by mouth      celecoxib (CELEBREX) 200 mg capsule Take by mouth      Cholecalciferol (VITAMIN D PO) Take 5,000 Units by mouth      denosumab (PROLIA) 60 mg/mL Inject under the skin      esomeprazole (NEXIUM) 40 MG capsule Take by mouth      Ferrous Sulfate (IRON PO) Take 65 mg by mouth daily      Multiple Vitamins-Minerals (CENTRUM SILVER 50+WOMEN) TABS Take by mouth      potassium chloride (MICRO-K) 10 MEQ CR capsule TAKE 2 CAPSULES BY MOUTH TWICE DAILY AS DIRECTED 360 capsule 3    predniSONE 5 mg tablet   0    rosuvastatin (CRESTOR) 20 MG tablet TAKE 1 TABLET BY MOUTH EVERY DAY AS DIRECTED 90 tablet 3    Synthroid 50 MCG tablet Take 50 mcg by mouth daily      telmisartan (MICARDIS) 80 MG tablet Take 1 tablet (80 mg total) by mouth daily 90 tablet 1    zolpidem (AMBIEN) 5 mg tablet Take by mouth      diphenoxylate-atropine (LOMOTIL) 2 5-0 025 mg per tablet Take 1 tablet by mouth 2 (two) times a day (Patient not taking: Reported on 6/7/2021) 60 tablet 2    levothyroxine 50 mcg tablet TAKE 1 TABLET BY MOUTH EVERY DAY AS DIRECTED (Patient not taking: Reported on 6/7/2021) 90 tablet 3    oxyCODONE-acetaminophen (PERCOCET) 7 5-325 MG per tablet 1 tablet 3 times a day (Patient not taking: Reported on 7/26/2021)  0     No current facility-administered medications for this visit      _______________________________________________________________________  Review of Systems   Constitutional: Negative for activity change (in WC), appetite change, chills, diaphoresis, fatigue and fever  HENT: Negative for congestion, ear discharge, ear pain, facial swelling, nosebleeds, sore throat, tinnitus, trouble swallowing and voice change  Eyes: Negative for photophobia, pain, discharge, redness, itching and visual disturbance  Respiratory: Negative for apnea, cough, choking, chest tightness and shortness of breath  Cardiovascular: Negative for chest pain, palpitations and leg swelling  Denies any and all cardiac symptoms   Gastrointestinal: Negative for abdominal distention, abdominal pain, blood in stool, constipation, diarrhea, nausea and vomiting  Endocrine: Negative for cold intolerance, heat intolerance, polydipsia, polyphagia and polyuria  Genitourinary: Negative for decreased urine volume, difficulty urinating, dysuria, enuresis, frequency, hematuria, pelvic pain, urgency and vaginal bleeding  Musculoskeletal: Positive for arthralgias, back pain, gait problem and myalgias  Negative for joint swelling, neck pain and neck stiffness  Severe scoliosis with kyphosis throughout spine especially lumbar spine  History of PMR on prednisone    Sees Dr Gallo Pu and I will call him now and f/u   Skin: Negative for color change, pallor and rash  Allergic/Immunologic: Negative for immunocompromised state  Neurological: Negative for dizziness, seizures, facial asymmetry, light-headedness, numbness and headaches  Hematological: Negative for adenopathy  Psychiatric/Behavioral: Negative for agitation, behavioral problems, confusion, decreased concentration, dysphoric mood and hallucinations  Objective:  Vitals:    07/26/21 0946   BP: 126/68   Pulse: 72   Resp: 18   Temp: (!) 96 4 °F (35 8 °C)   SpO2: 98%   Weight: 57 4 kg (126 lb 8 oz)   Height: 4' 9" (1 448 m)     Body mass index is 27 37 kg/m²  Physical Exam  Vitals and nursing note reviewed  Constitutional:       General: She is not in acute distress  Appearance: Normal appearance  She is well-developed  She is obese  She is not ill-appearing, toxic-appearing or diaphoretic  HENT:      Head: Normocephalic and atraumatic  Nose: Nose normal    Eyes:      General: No scleral icterus  Right eye: No discharge  Left eye: No discharge  Conjunctiva/sclera: Conjunctivae normal       Pupils: Pupils are equal, round, and reactive to light  Neck:      Thyroid: No thyromegaly  Trachea: No tracheal deviation  Cardiovascular:      Rate and Rhythm: Normal rate and regular rhythm  Heart sounds: Normal heart sounds  Pulmonary:      Effort: No respiratory distress  Breath sounds: Normal breath sounds  No wheezing or rales  Chest:      Chest wall: No tenderness  Abdominal:      Palpations: Abdomen is soft  Musculoskeletal:         General: No tenderness or deformity  Normal range of motion  Cervical back: Normal range of motion and neck supple  Right lower leg: No edema  Left lower leg: No edema  Comments: Patient with many many years severe kyphoscoliosis lumbar spine  Cannot ambulate without walker and review of rheumatologist notes suggest impending surgery needed     Skin:     General: Skin is warm and dry       Coloration: Skin is not pale  Findings: No erythema or rash  Neurological:      General: No focal deficit present  Mental Status: She is alert and oriented to person, place, and time  Cranial Nerves: No cranial nerve deficit  Gait: Gait abnormal       Deep Tendon Reflexes: Reflexes abnormal    Psychiatric:         Mood and Affect: Mood normal          Behavior: Behavior normal          Thought Content:  Thought content normal          Judgment: Judgment normal        Pt and  seen from 10:05 to 11:04 a m

## 2021-08-05 ENCOUNTER — OFFICE VISIT (OUTPATIENT)
Dept: ENDOCRINOLOGY | Facility: CLINIC | Age: 71
End: 2021-08-05
Payer: MEDICARE

## 2021-08-05 VITALS
HEIGHT: 57 IN | HEART RATE: 60 BPM | SYSTOLIC BLOOD PRESSURE: 126 MMHG | DIASTOLIC BLOOD PRESSURE: 74 MMHG | BODY MASS INDEX: 27.37 KG/M2

## 2021-08-05 DIAGNOSIS — E83.52 HYPERCALCEMIA: Primary | ICD-10-CM

## 2021-08-05 DIAGNOSIS — E03.9 HYPOTHYROIDISM, UNSPECIFIED TYPE: ICD-10-CM

## 2021-08-05 DIAGNOSIS — M81.0 AGE-RELATED OSTEOPOROSIS WITHOUT CURRENT PATHOLOGICAL FRACTURE: ICD-10-CM

## 2021-08-05 PROCEDURE — 99215 OFFICE O/P EST HI 40 MIN: CPT | Performed by: INTERNAL MEDICINE

## 2021-08-05 NOTE — PROGRESS NOTES
Follow up Patient Progress Note      CC: hypercalcemia    History of Present Illness:   73 yr female with hypothyroidism, HTN, severe HLD, DJD spine, osteoporosis on prolia for 7 years, PMR, Anemia, Essential thrombocytosis and obesity  Last visit was 6/7/21  She has mild hypercalcemia unrelated to PTH with increased bone markers turnover with elevated C-telopeptide and NTX but nomral PTH/PTHrp/vit D levels  Given her Hx of immobility and prolia use, this is most consistent with bone metabolism after stopping prolia  Was on prolia for 7 years  Last dose was 12/2020      Patient Active Problem List   Diagnosis    Thrombocytosis (HCC)    Anemia    Iron deficiency anemia, unspecified    Spinal stenosis    Hypertension    Polymyalgia rheumatica (HCC)    Arthritis    Hernia, umbilical    Irritable bowel syndrome with diarrhea    Increased BMI     Past Medical History:   Diagnosis Date    Arthritis     Hernia, umbilical     Hypertension     Spinal stenosis       Past Surgical History:   Procedure Laterality Date    BREAST CYST EXCISION Left 1998    FOOT SURGERY      Toe    HERNIA REPAIR      HYSTERECTOMY      MAMMO (HISTORICAL)  2017    TONSILLECTOMY        Family History   Problem Relation Age of Onset    Hypertension Mother     Cancer Father     Colon cancer Father 62    No Known Problems Sister     Breast cancer Maternal Grandmother         not sure of her age of onset   [facility] No Known Problems Sister     No Known Problems Maternal Aunt     No Known Problems Maternal Aunt     No Known Problems Maternal Aunt     No Known Problems Maternal Aunt     No Known Problems Paternal Aunt     No Known Problems Paternal Aunt      Social History     Tobacco Use    Smoking status: Never Smoker    Smokeless tobacco: Never Used   Substance Use Topics    Alcohol use: No     Allergies   Allergen Reactions    Aspirin     Ibuprofen          Review of Systems   Constitutional: Positive for fatigue  HENT: Negative  Eyes: Negative  Respiratory: Negative  Cardiovascular: Negative  Gastrointestinal: Negative  Endocrine: Negative  Musculoskeletal: Positive for arthralgias, back pain, gait problem and myalgias  Skin: Negative  Allergic/Immunologic: Negative  Hematological: Negative  Psychiatric/Behavioral: Negative            Current Outpatient Medications:     baclofen 20 mg tablet, TAKE 1 TABLET BY MOUTH FOUR TIMES DAILY, Disp: 120 tablet, Rfl: 6    celecoxib (CELEBREX) 200 mg capsule, Take by mouth, Disp: , Rfl:     Cholecalciferol (VITAMIN D PO), Take 5,000 Units by mouth, Disp: , Rfl:     denosumab (PROLIA) 60 mg/mL, Inject under the skin, Disp: , Rfl:     esomeprazole (NEXIUM) 40 MG capsule, Take by mouth, Disp: , Rfl:     Ferrous Sulfate (IRON PO), Take 65 mg by mouth daily, Disp: , Rfl:     potassium chloride (MICRO-K) 10 MEQ CR capsule, TAKE 2 CAPSULES BY MOUTH TWICE DAILY AS DIRECTED, Disp: 360 capsule, Rfl: 3    rosuvastatin (CRESTOR) 20 MG tablet, TAKE 1 TABLET BY MOUTH EVERY DAY AS DIRECTED, Disp: 90 tablet, Rfl: 3    Synthroid 50 MCG tablet, Take 50 mcg by mouth daily, Disp: , Rfl:     telmisartan (MICARDIS) 80 MG tablet, Take 1 tablet (80 mg total) by mouth daily, Disp: 90 tablet, Rfl: 1    zolpidem (AMBIEN) 5 mg tablet, Take by mouth, Disp: , Rfl:     Calcium Carbonate-Vitamin D 600-200 MG-UNIT TABS, Take by mouth (Patient not taking: Reported on 8/5/2021), Disp: , Rfl:     diphenoxylate-atropine (LOMOTIL) 2 5-0 025 mg per tablet, Take 1 tablet by mouth 2 (two) times a day (Patient not taking: Reported on 6/7/2021), Disp: 60 tablet, Rfl: 2    levothyroxine 50 mcg tablet, TAKE 1 TABLET BY MOUTH EVERY DAY AS DIRECTED (Patient not taking: Reported on 6/7/2021), Disp: 90 tablet, Rfl: 3    Multiple Vitamins-Minerals (CENTRUM SILVER 50+WOMEN) TABS, Take by mouth (Patient not taking: Reported on 8/5/2021), Disp: , Rfl:     oxyCODONE-acetaminophen (PERCOCET) 7 5-325 MG per tablet, 1 tablet 3 times a day (Patient not taking: Reported on 7/26/2021), Disp: , Rfl: 0    predniSONE 5 mg tablet, , Disp: , Rfl: 0    Physical Exam:  Body mass index is 27 37 kg/m²  /74   Pulse 60   Ht 4' 9" (1 448 m)   BMI 27 37 kg/m²    [unfilled]     Physical Exam  Constitutional:       Appearance: She is well-developed  HENT:      Head: Normocephalic  Eyes:      Pupils: Pupils are equal, round, and reactive to light  Neck:      Thyroid: No thyromegaly  Cardiovascular:      Rate and Rhythm: Normal rate  Heart sounds: Normal heart sounds  Pulmonary:      Effort: Pulmonary effort is normal       Breath sounds: Normal breath sounds  Abdominal:      General: Bowel sounds are normal       Palpations: Abdomen is soft  Musculoskeletal:         General: No deformity  Cervical back: Normal range of motion  Comments: Kyphosis, rollator walker dependent  Skin:     Capillary Refill: Capillary refill takes less than 2 seconds  Coloration: Skin is not pale  Findings: No rash  Neurological:      Mental Status: She is alert and oriented to person, place, and time  Labs:     Lab Results   Component Value Date    UMR0HIHWKEIS 3 042 06/22/2021       Lab Results   Component Value Date    CREATININE 0 73 06/22/2021    CREATININE 0 87 03/30/2021    CREATININE 0 90 03/19/2021    BUN 17 06/22/2021    K 3 9 06/22/2021     06/22/2021    CO2 23 06/22/2021     eGFR   Date Value Ref Range Status   06/22/2021 84 ml/min/1 73sq m Final       Lab Results   Component Value Date    ALT 23 06/22/2021    AST 20 06/22/2021    ALKPHOS 117 (H) 06/22/2021       Lab Results   Component Value Date    CHOLESTEROL 176 03/19/2021     Lab Results   Component Value Date    HDL 73 03/19/2021     Lab Results   Component Value Date    TRIG 66 03/19/2021     Lab Results   Component Value Date    NONHDLC 103 03/19/2021         Impression:  1  Hypercalcemia    2   Age-related osteoporosis without current pathological fracture    3  Hypothyroidism, unspecified type         Plan:    Diagnoses and all orders for this visit:    Hypercalcemia  Multifactorial from HCTZ, immobility, severe DJD and withdrawal of prolia  HCTZ is on hold  Will wait for repeat labs before making further changes  Patient is not keen to use any new treatments at this time  Age-related osteoporosis without current pathological fracture  She has osteoporosis for many years in context of severe DJD with increased bone turnover after coming off prolia  Given DJD, her DXA bone scan may be falsely underreporting her osteoporosis  Patients' perspective is that she got worse on prolia and progressed from osteopenia to osteoporosis  Today I counseled her that she needs some therapy for osteoporosis other than diet and weight bearing physical activity  Discussed bisphosphonates, Prolia, PTH analogues, Evenity and reclast as options  She does not want "any shots" now  She agreed to wait till next labs and then make further decisions  She will consider follow up with PCP for osteoporosis  My recommendations are to consider Forteo or Tymlos for 12-18 months for anabolic action specially in context of increased bone turnover followed by Prolia or evenity as possible  She may also be considered for reclast yearly but Fosamax will be less preferred  Total time for counseling was 45min    Hypothyroidism, unspecified type  Seems clincially and biochemically euthyroid on Levothyroxine 50mcg qdaily  I have spent 45 minutes with patient today in which greater than 50% of this time was spent in counseling/coordination of care  All questioned fully answered  She will call me if any problems arise  Educated/ Counseled patient on diagnostic test results, prognosis, risk vs benefit of treatment options, importance of treatment compliance, healthy life and lifestyle choices        [address]

## 2021-08-09 NOTE — PROGRESS NOTES
Jessica - tell Sandrita she'd be a good candidate for Evanity once a mo x 12 mo  That would be my advice, if insur covers

## 2021-09-15 DIAGNOSIS — K58.0 IRRITABLE BOWEL SYNDROME WITH DIARRHEA: ICD-10-CM

## 2021-09-16 RX ORDER — DIPHENOXYLATE HYDROCHLORIDE AND ATROPINE SULFATE 2.5; .025 MG/1; MG/1
TABLET ORAL
Qty: 60 TABLET | Refills: 3 | Status: SHIPPED | OUTPATIENT
Start: 2021-09-16 | End: 2022-04-27

## 2021-09-29 ENCOUNTER — APPOINTMENT (OUTPATIENT)
Dept: LAB | Facility: CLINIC | Age: 71
End: 2021-09-29
Payer: MEDICARE

## 2021-09-29 DIAGNOSIS — E83.52 HYPERCALCEMIA: ICD-10-CM

## 2021-09-29 DIAGNOSIS — D75.839 THROMBOCYTOSIS: ICD-10-CM

## 2021-09-29 LAB
ALBUMIN SERPL BCP-MCNC: 3.7 G/DL (ref 3.5–5)
ALP SERPL-CCNC: 84 U/L (ref 46–116)
ALT SERPL W P-5'-P-CCNC: 20 U/L (ref 12–78)
ANION GAP SERPL CALCULATED.3IONS-SCNC: 9 MMOL/L (ref 4–13)
AST SERPL W P-5'-P-CCNC: 19 U/L (ref 5–45)
BASOPHILS # BLD AUTO: 0.04 THOUSANDS/ΜL (ref 0–0.1)
BASOPHILS NFR BLD AUTO: 1 % (ref 0–1)
BILIRUB SERPL-MCNC: 0.51 MG/DL (ref 0.2–1)
BUN SERPL-MCNC: 19 MG/DL (ref 5–25)
CALCIUM SERPL-MCNC: 9.1 MG/DL (ref 8.3–10.1)
CHLORIDE SERPL-SCNC: 105 MMOL/L (ref 100–108)
CO2 SERPL-SCNC: 25 MMOL/L (ref 21–32)
CREAT SERPL-MCNC: 0.77 MG/DL (ref 0.6–1.3)
EOSINOPHIL # BLD AUTO: 0.29 THOUSAND/ΜL (ref 0–0.61)
EOSINOPHIL NFR BLD AUTO: 6 % (ref 0–6)
ERYTHROCYTE [DISTWIDTH] IN BLOOD BY AUTOMATED COUNT: 13.7 % (ref 11.6–15.1)
GFR SERPL CREATININE-BSD FRML MDRD: 79 ML/MIN/1.73SQ M
GLUCOSE P FAST SERPL-MCNC: 84 MG/DL (ref 65–99)
HCT VFR BLD AUTO: 45.2 % (ref 34.8–46.1)
HGB BLD-MCNC: 13.8 G/DL (ref 11.5–15.4)
IMM GRANULOCYTES # BLD AUTO: 0.02 THOUSAND/UL (ref 0–0.2)
IMM GRANULOCYTES NFR BLD AUTO: 0 % (ref 0–2)
LYMPHOCYTES # BLD AUTO: 1.46 THOUSANDS/ΜL (ref 0.6–4.47)
LYMPHOCYTES NFR BLD AUTO: 30 % (ref 14–44)
MCH RBC QN AUTO: 29.2 PG (ref 26.8–34.3)
MCHC RBC AUTO-ENTMCNC: 30.5 G/DL (ref 31.4–37.4)
MCV RBC AUTO: 96 FL (ref 82–98)
MONOCYTES # BLD AUTO: 0.44 THOUSAND/ΜL (ref 0.17–1.22)
MONOCYTES NFR BLD AUTO: 9 % (ref 4–12)
NEUTROPHILS # BLD AUTO: 2.67 THOUSANDS/ΜL (ref 1.85–7.62)
NEUTS SEG NFR BLD AUTO: 54 % (ref 43–75)
NRBC BLD AUTO-RTO: 0 /100 WBCS
PHOSPHATE SERPL-MCNC: 3.8 MG/DL (ref 2.3–4.1)
PLATELET # BLD AUTO: 437 THOUSANDS/UL (ref 149–390)
PMV BLD AUTO: 8.8 FL (ref 8.9–12.7)
POTASSIUM SERPL-SCNC: 4.4 MMOL/L (ref 3.5–5.3)
PROT SERPL-MCNC: 7.9 G/DL (ref 6.4–8.2)
PTH-INTACT SERPL-MCNC: 125.1 PG/ML (ref 18.4–80.1)
RBC # BLD AUTO: 4.72 MILLION/UL (ref 3.81–5.12)
SODIUM SERPL-SCNC: 139 MMOL/L (ref 136–145)
WBC # BLD AUTO: 4.92 THOUSAND/UL (ref 4.31–10.16)

## 2021-09-29 PROCEDURE — 80053 COMPREHEN METABOLIC PANEL: CPT

## 2021-09-29 PROCEDURE — 36415 COLL VENOUS BLD VENIPUNCTURE: CPT

## 2021-09-29 PROCEDURE — 83970 ASSAY OF PARATHORMONE: CPT

## 2021-09-29 PROCEDURE — 85025 COMPLETE CBC W/AUTO DIFF WBC: CPT

## 2021-09-29 PROCEDURE — 84100 ASSAY OF PHOSPHORUS: CPT

## 2021-09-30 ENCOUNTER — TELEPHONE (OUTPATIENT)
Dept: ENDOCRINOLOGY | Facility: CLINIC | Age: 71
End: 2021-09-30

## 2021-09-30 NOTE — TELEPHONE ENCOUNTER
Called to review labs  Left message and sent a mychart message  73 yr female with hypothyroidism, HTN, severe HLD, DJD spine, osteoporosis on prolia for 7 years last dose 12/2020, PMR, Anemia, Essential thrombocytosis and obesity was seen for hypercalcemia  She had hypercalcemia in context of HCTZ, immobility and prolia withdrawal associated with normal PTH, PTHrp and low normal 25 OH vit D  24hr urine ca was 95mg (<100mg)  DXA showed severe osteoporosis with hip T scores in -3 2 range and FRAX score 7 4% for hip fracture  She follows with rheumatology for her osteoporosis  Now she has corrected Ca 9 3mg/dL associated with PTH 125pg/mL  Differentials at this time would include osteomalacia and Ctra  [address]  She would benefit from calcium 1200mg qdaily, vitamin D  Supplementation to achieve >30ng/mL and Rx for osteoporosis possibly zolendronic acid or prolia

## 2021-10-06 ENCOUNTER — TELEPHONE (OUTPATIENT)
Dept: ENDOCRINOLOGY | Facility: CLINIC | Age: 71
End: 2021-10-06

## 2021-10-06 ENCOUNTER — TELEPHONE (OUTPATIENT)
Dept: HEMATOLOGY ONCOLOGY | Facility: CLINIC | Age: 71
End: 2021-10-06

## 2021-10-13 ENCOUNTER — TELEPHONE (OUTPATIENT)
Dept: HEMATOLOGY ONCOLOGY | Facility: CLINIC | Age: 71
End: 2021-10-13

## 2021-10-15 ENCOUNTER — TELEPHONE (OUTPATIENT)
Dept: HEMATOLOGY ONCOLOGY | Facility: CLINIC | Age: 71
End: 2021-10-15

## 2021-10-29 DIAGNOSIS — E03.9 HYPOTHYROIDISM, UNSPECIFIED TYPE: ICD-10-CM

## 2021-10-29 DIAGNOSIS — E87.6 HYPOPOTASSEMIA: ICD-10-CM

## 2021-10-29 RX ORDER — LEVOTHYROXINE SODIUM 0.05 MG/1
TABLET ORAL
Qty: 90 TABLET | Refills: 3 | Status: SHIPPED | OUTPATIENT
Start: 2021-10-29

## 2021-10-29 RX ORDER — POTASSIUM CHLORIDE 750 MG/1
CAPSULE, EXTENDED RELEASE ORAL
Qty: 360 CAPSULE | Refills: 3 | Status: SHIPPED | OUTPATIENT
Start: 2021-10-29

## 2021-11-04 DIAGNOSIS — E78.2 MIXED HYPERLIPIDEMIA: ICD-10-CM

## 2021-11-04 RX ORDER — ROSUVASTATIN CALCIUM 20 MG/1
TABLET, COATED ORAL
Qty: 90 TABLET | Refills: 3 | Status: SHIPPED | OUTPATIENT
Start: 2021-11-04

## 2021-11-08 ENCOUNTER — OFFICE VISIT (OUTPATIENT)
Dept: HEMATOLOGY ONCOLOGY | Facility: CLINIC | Age: 71
End: 2021-11-08
Payer: MEDICARE

## 2021-11-08 VITALS
RESPIRATION RATE: 16 BRPM | OXYGEN SATURATION: 98 % | SYSTOLIC BLOOD PRESSURE: 110 MMHG | WEIGHT: 127 LBS | HEIGHT: 57 IN | TEMPERATURE: 98.5 F | BODY MASS INDEX: 27.4 KG/M2 | DIASTOLIC BLOOD PRESSURE: 76 MMHG | HEART RATE: 65 BPM

## 2021-11-08 DIAGNOSIS — D75.839 THROMBOCYTOSIS: Primary | ICD-10-CM

## 2021-11-08 PROCEDURE — 99213 OFFICE O/P EST LOW 20 MIN: CPT | Performed by: PHYSICIAN ASSISTANT

## 2021-11-15 ENCOUNTER — OFFICE VISIT (OUTPATIENT)
Dept: FAMILY MEDICINE CLINIC | Facility: CLINIC | Age: 71
End: 2021-11-15
Payer: MEDICARE

## 2021-11-15 VITALS
DIASTOLIC BLOOD PRESSURE: 82 MMHG | BODY MASS INDEX: 27.4 KG/M2 | HEART RATE: 57 BPM | HEIGHT: 57 IN | SYSTOLIC BLOOD PRESSURE: 136 MMHG | OXYGEN SATURATION: 93 % | TEMPERATURE: 97.7 F | RESPIRATION RATE: 12 BRPM | WEIGHT: 127 LBS

## 2021-11-15 DIAGNOSIS — Z23 NEED FOR VACCINATION: ICD-10-CM

## 2021-11-15 DIAGNOSIS — E03.9 HYPOTHYROIDISM, UNSPECIFIED TYPE: ICD-10-CM

## 2021-11-15 DIAGNOSIS — I10 HTN (HYPERTENSION), BENIGN: Primary | ICD-10-CM

## 2021-11-15 DIAGNOSIS — E78.2 MIXED HYPERLIPIDEMIA: ICD-10-CM

## 2021-11-15 DIAGNOSIS — E55.9 VITAMIN D INSUFFICIENCY: ICD-10-CM

## 2021-11-15 PROCEDURE — 90662 IIV NO PRSV INCREASED AG IM: CPT

## 2021-11-15 PROCEDURE — G0008 ADMIN INFLUENZA VIRUS VAC: HCPCS

## 2021-11-15 PROCEDURE — 99214 OFFICE O/P EST MOD 30 MIN: CPT

## 2021-11-22 ENCOUNTER — APPOINTMENT (OUTPATIENT)
Dept: LAB | Facility: CLINIC | Age: 71
End: 2021-11-22
Payer: MEDICARE

## 2021-11-22 DIAGNOSIS — E55.9 VITAMIN D INSUFFICIENCY: ICD-10-CM

## 2021-11-22 DIAGNOSIS — E03.9 HYPOTHYROIDISM, UNSPECIFIED TYPE: ICD-10-CM

## 2021-11-22 DIAGNOSIS — I10 HTN (HYPERTENSION), BENIGN: ICD-10-CM

## 2021-11-22 DIAGNOSIS — E78.2 MIXED HYPERLIPIDEMIA: ICD-10-CM

## 2021-11-22 LAB
25(OH)D3 SERPL-MCNC: 22.3 NG/ML (ref 30–100)
ALBUMIN SERPL BCP-MCNC: 3.7 G/DL (ref 3.5–5)
ALP SERPL-CCNC: 86 U/L (ref 46–116)
ALT SERPL W P-5'-P-CCNC: 21 U/L (ref 12–78)
ANION GAP SERPL CALCULATED.3IONS-SCNC: 10 MMOL/L (ref 4–13)
AST SERPL W P-5'-P-CCNC: 17 U/L (ref 5–45)
BACTERIA UR QL AUTO: NORMAL /HPF
BASOPHILS # BLD AUTO: 0.04 THOUSANDS/ΜL (ref 0–0.1)
BASOPHILS NFR BLD AUTO: 1 % (ref 0–1)
BILIRUB SERPL-MCNC: 0.53 MG/DL (ref 0.2–1)
BILIRUB UR QL STRIP: NEGATIVE
BUN SERPL-MCNC: 21 MG/DL (ref 5–25)
CALCIUM SERPL-MCNC: 9.4 MG/DL (ref 8.3–10.1)
CHLORIDE SERPL-SCNC: 105 MMOL/L (ref 100–108)
CHOLEST SERPL-MCNC: 173 MG/DL
CLARITY UR: CLEAR
CO2 SERPL-SCNC: 26 MMOL/L (ref 21–32)
COLOR UR: ABNORMAL
CREAT SERPL-MCNC: 0.91 MG/DL (ref 0.6–1.3)
EOSINOPHIL # BLD AUTO: 0.21 THOUSAND/ΜL (ref 0–0.61)
EOSINOPHIL NFR BLD AUTO: 5 % (ref 0–6)
ERYTHROCYTE [DISTWIDTH] IN BLOOD BY AUTOMATED COUNT: 13.8 % (ref 11.6–15.1)
GFR SERPL CREATININE-BSD FRML MDRD: 65 ML/MIN/1.73SQ M
GLUCOSE P FAST SERPL-MCNC: 88 MG/DL (ref 65–99)
GLUCOSE UR STRIP-MCNC: NEGATIVE MG/DL
HCT VFR BLD AUTO: 46.1 % (ref 34.8–46.1)
HDLC SERPL-MCNC: 64 MG/DL
HGB BLD-MCNC: 14 G/DL (ref 11.5–15.4)
HGB UR QL STRIP.AUTO: NEGATIVE
IMM GRANULOCYTES # BLD AUTO: 0.01 THOUSAND/UL (ref 0–0.2)
IMM GRANULOCYTES NFR BLD AUTO: 0 % (ref 0–2)
KETONES UR STRIP-MCNC: ABNORMAL MG/DL
LDLC SERPL CALC-MCNC: 92 MG/DL (ref 0–100)
LEUKOCYTE ESTERASE UR QL STRIP: ABNORMAL
LYMPHOCYTES # BLD AUTO: 1.17 THOUSANDS/ΜL (ref 0.6–4.47)
LYMPHOCYTES NFR BLD AUTO: 27 % (ref 14–44)
MCH RBC QN AUTO: 28.7 PG (ref 26.8–34.3)
MCHC RBC AUTO-ENTMCNC: 30.4 G/DL (ref 31.4–37.4)
MCV RBC AUTO: 95 FL (ref 82–98)
MONOCYTES # BLD AUTO: 0.41 THOUSAND/ΜL (ref 0.17–1.22)
MONOCYTES NFR BLD AUTO: 10 % (ref 4–12)
NEUTROPHILS # BLD AUTO: 2.44 THOUSANDS/ΜL (ref 1.85–7.62)
NEUTS SEG NFR BLD AUTO: 57 % (ref 43–75)
NITRITE UR QL STRIP: NEGATIVE
NON-SQ EPI CELLS URNS QL MICRO: NORMAL /HPF
NONHDLC SERPL-MCNC: 109 MG/DL
NRBC BLD AUTO-RTO: 0 /100 WBCS
PH UR STRIP.AUTO: 6 [PH]
PLATELET # BLD AUTO: 389 THOUSANDS/UL (ref 149–390)
PMV BLD AUTO: 8.6 FL (ref 8.9–12.7)
POTASSIUM SERPL-SCNC: 4.2 MMOL/L (ref 3.5–5.3)
PROT SERPL-MCNC: 7.9 G/DL (ref 6.4–8.2)
PROT UR STRIP-MCNC: NEGATIVE MG/DL
RBC # BLD AUTO: 4.88 MILLION/UL (ref 3.81–5.12)
RBC #/AREA URNS AUTO: NORMAL /HPF
SODIUM SERPL-SCNC: 141 MMOL/L (ref 136–145)
SP GR UR STRIP.AUTO: 1.01 (ref 1–1.03)
T4 FREE SERPL-MCNC: 1.22 NG/DL (ref 0.76–1.46)
TRIGL SERPL-MCNC: 86 MG/DL
TSH SERPL DL<=0.05 MIU/L-ACNC: 3.88 UIU/ML (ref 0.36–3.74)
UROBILINOGEN UR QL STRIP.AUTO: 0.2 E.U./DL
WBC # BLD AUTO: 4.28 THOUSAND/UL (ref 4.31–10.16)
WBC #/AREA URNS AUTO: NORMAL /HPF

## 2021-11-22 PROCEDURE — 85025 COMPLETE CBC W/AUTO DIFF WBC: CPT

## 2021-11-22 PROCEDURE — 84443 ASSAY THYROID STIM HORMONE: CPT

## 2021-11-22 PROCEDURE — 84439 ASSAY OF FREE THYROXINE: CPT

## 2021-11-22 PROCEDURE — 80053 COMPREHEN METABOLIC PANEL: CPT

## 2021-11-22 PROCEDURE — 82306 VITAMIN D 25 HYDROXY: CPT

## 2021-11-22 PROCEDURE — 80061 LIPID PANEL: CPT

## 2021-11-22 PROCEDURE — 81001 URINALYSIS AUTO W/SCOPE: CPT

## 2021-11-22 PROCEDURE — 36415 COLL VENOUS BLD VENIPUNCTURE: CPT

## 2021-11-30 ENCOUNTER — TELEPHONE (OUTPATIENT)
Dept: FAMILY MEDICINE CLINIC | Facility: CLINIC | Age: 71
End: 2021-11-30

## 2022-01-03 DIAGNOSIS — M62.838 MUSCLE SPASM OF BOTH LOWER LEGS: ICD-10-CM

## 2022-01-03 RX ORDER — BACLOFEN 20 MG/1
TABLET ORAL
Qty: 120 TABLET | Refills: 6 | Status: SHIPPED | OUTPATIENT
Start: 2022-01-03 | End: 2022-08-04

## 2022-01-05 ENCOUNTER — OFFICE VISIT (OUTPATIENT)
Dept: OBGYN CLINIC | Facility: CLINIC | Age: 72
End: 2022-01-05
Payer: MEDICARE

## 2022-01-05 VITALS
WEIGHT: 126.1 LBS | SYSTOLIC BLOOD PRESSURE: 157 MMHG | HEIGHT: 57 IN | DIASTOLIC BLOOD PRESSURE: 74 MMHG | HEART RATE: 71 BPM | BODY MASS INDEX: 27.21 KG/M2

## 2022-01-05 DIAGNOSIS — S42.021K CLOSED DISPLACED FRACTURE OF SHAFT OF RIGHT CLAVICLE WITH NONUNION, SUBSEQUENT ENCOUNTER: ICD-10-CM

## 2022-01-05 DIAGNOSIS — M19.011 PRIMARY OSTEOARTHRITIS OF RIGHT SHOULDER: Primary | ICD-10-CM

## 2022-01-05 PROCEDURE — 99204 OFFICE O/P NEW MOD 45 MIN: CPT | Performed by: ORTHOPAEDIC SURGERY

## 2022-01-05 NOTE — PATIENT INSTRUCTIONS
Exercises for Shoulder Abduction and Adduction   AMBULATORY CARE:   Shoulder abduction and adduction exercises  work the muscles at the back of your shoulder and your upper back  Before you exercise:  Warm up and stretch before you exercise  Walk or ride a stationary bike for 5 to 10 minutes to help you warm up  Stretching helps increase range of motion  It may also decrease muscle soreness and help prevent another injury  Your healthcare provider will tell you which of the following stretches to do:  · Crossover arm stretch:  Relax your shoulders  Hold your upper arm with the opposite hand  Pull your arm across your chest until you feel a stretch  Hold the stretch for 30 seconds  Return to the starting position  · Shoulder flexion stretch:  Stand facing a wall  Slowly walk your fingers up the wall until you feel a stretch  Hold the stretch for 30 seconds  Return to the starting position  · Sleeper stretch:  Lie on your injured side on a firm, flat surface  Bend the elbow of your injured arm 90° with your hand facing up  Use your arm that is not injured to slowly push your injured arm down  Stop when you feel a stretch at the back of your injured shoulder  Hold the stretch for 30 seconds  Slowly return to the starting position  Contact your healthcare provider if:   · You have sharp or worsening pain during exercise or at rest     · You have questions or concerns about your shoulder exercises  How to exercise with a weight:  Your healthcare provider will tell you how much weight to use  · Shoulder abduction:  Stand and hold a weight in your hand with your palm facing your body  Slowly raise your arm to the side with your thumb pointing up  Then raise your arm over your head as far as you can without pain  Hold this position for as long as directed  Do not raise your arm over your head unless your healthcare provider says it is okay            · Shoulder adduction:  Lie on your back on a firm surface  Extend your arm out to a "T " Bend your elbow so your forearm in the air  Hold a weight in your hand  Slowly raise your arm toward the ceiling and straighten your elbow  Hold this position for as long as directed  Slowly return to the starting position  How to exercise with an exercise band:   · Shoulder abduction:  Wrap the exercise band around a heavy, stable object near your foot  Grab the band with the hand of your injured shoulder  Keep your arm straight  Slowly raise your arm to the side with your thumb pointing up  Then, slowly pull the band over your head as far as you can without pain  Do not raise your arm over your head unless your healthcare provider says it is okay  Do not let your shoulder shrug  Hold this position for as long as directed  Slowly return to the starting position  · Shoulder adduction:  Wrap the exercise band around a heavy, stable object  Stand and face away from where the band is anchored  Hold each end of the band in both hands with your elbows bent  Your elbows should not be behind your body  Keep your arms parallel to the floor and slowly straighten your elbows  Hold this position for as long as directed  Slowly return to the starting position  Follow up with your physical therapist as directed:  Write down your questions so you remember to ask them at your visits  © Copyright [employer] 2021 Information is for End User's use only and may not be sold, redistributed or otherwise used for commercial purposes  All illustrations and images included in CareNotes® are the copyrighted property of [employer]  or Leelee Conway   The above information is an  only  It is not intended as medical advice for individual conditions or treatments  Talk to your doctor, nurse or pharmacist before following any medical regimen to see if it is safe and effective for you    Exercises for Internal and External Shoulder Rotation   AMBULATORY CARE:   Muscles worked during internal and external shoulder exercises:  Exercises for internal shoulder rotation work the muscles in your chest and front of your shoulder  Exercises for external shoulder rotation work the muscles in the back of your shoulder and upper back  Contact your healthcare provider if:   · You have sharp or worsening pain during exercise or at rest     · You have questions or concerns about your shoulder exercises  Before you exercise:  Warm up and stretch before you exercise  Walk or ride a stationary bike for 5 to 10 minutes to help you warm up  Stretching helps increase range of motion  It may also decrease muscle soreness and help prevent another injury  Your healthcare provider will tell you which of the following stretches to do:  · Crossover arm stretch:  Relax your shoulders  Hold your upper arm with the opposite hand  Pull your arm across your chest until you feel a stretch  Hold the stretch for 30 seconds  Return to the starting position  · Shoulder flexion stretch:  Stand facing a wall  Slowly walk your fingers up the wall until you feel a stretch  Hold the stretch for 30 seconds  Return to the starting position  · Sleeper stretch:  Lie on your injured side on a firm, flat surface  Bend the elbow of your injured arm 90° with your hand facing up  Use your arm that is not injured to slowly push your injured arm down  Stop when you feel a stretch at the back of your injured shoulder  Hold the stretch for 30 seconds  Slowly return to the starting position  How to exercise with a weight:  Your healthcare provider will tell you how much weight to exercise with  · Shoulder internal rotation:  Sit in a chair  Place a rolled up towel between your elbow and your side  Bend your elbow to 90°  Gently squeeze the towel with your elbow to prevent it from falling out  Hold the weight with your thumb pointing up   Slowly move the weight across your chest  Stop when your hand reaches your opposite arm  Hold this position for as many seconds as directed  Slowly return to the starting position  · Shoulder external rotation:  Lie on your side with your injured shoulder facing up  Bend your elbow 90°  Place a rolled up towel between your elbow and your side  Hold a weight in your hand  Gently squeeze the towel with your elbow to prevent it from falling out  Slowly rotate your arm outward, but keep your elbow bent  Stop when you feel a stretch  Hold this position for 30 seconds or as directed  Slowly return to the starting position  How to exercise with an exercise band:   · Shoulder internal rotation:  Tie one end of the exercise band to a heavy, secure object  Sit in a chair  Place a rolled up towel between your elbow and your side  Bend your elbow to 90°  Gently squeeze the towel with your elbow to prevent it from falling out  Slowly pull the band across your chest  Stop when your hand reaches your opposite arm  Hold this position for as many seconds as directed  Slowly return to the starting position  · Shoulder external rotation:  Hold one end of the exercise band on the side that is not injured  Place a rolled up towel between your elbow and your side  Bend your elbow 90°  Squeeze the towel with your elbow  Grab the end of the band and slowly turn your arm outward, but keep your elbow bent  Stop when you feel a stretch  Hold this position for 30 seconds or as directed  Slowly return to the starting position  Follow up with your physical therapist as directed:  Write down your questions so you remember to ask them at your visits  © Copyright [employer] 2021 Information is for End User's use only and may not be sold, redistributed or otherwise used for commercial purposes  All illustrations and images included in CareNotes® are the copyrighted property of [employer]  or Leelee Conway   The above information is an  only   It is not intended as medical advice for individual conditions or treatments  Talk to your doctor, nurse or pharmacist before following any medical regimen to see if it is safe and effective for you
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
## 2022-01-26 ENCOUNTER — CLINICAL SUPPORT (OUTPATIENT)
Dept: FAMILY MEDICINE CLINIC | Facility: CLINIC | Age: 72
End: 2022-01-26
Payer: MEDICARE

## 2022-01-26 DIAGNOSIS — M81.0 OSTEOPOROSIS, UNSPECIFIED OSTEOPOROSIS TYPE, UNSPECIFIED PATHOLOGICAL FRACTURE PRESENCE: Primary | ICD-10-CM

## 2022-01-26 PROCEDURE — 96372 THER/PROPH/DIAG INJ SC/IM: CPT

## 2022-02-01 DIAGNOSIS — I10 ESSENTIAL HYPERTENSION: ICD-10-CM

## 2022-02-01 RX ORDER — TELMISARTAN 80 MG/1
80 TABLET ORAL DAILY
Qty: 90 TABLET | Refills: 1 | Status: SHIPPED | OUTPATIENT
Start: 2022-02-01 | End: 2022-03-21 | Stop reason: SDUPTHER

## 2022-03-21 ENCOUNTER — OFFICE VISIT (OUTPATIENT)
Dept: FAMILY MEDICINE CLINIC | Facility: CLINIC | Age: 72
End: 2022-03-21
Payer: MEDICARE

## 2022-03-21 VITALS
WEIGHT: 126.2 LBS | HEART RATE: 63 BPM | DIASTOLIC BLOOD PRESSURE: 80 MMHG | SYSTOLIC BLOOD PRESSURE: 132 MMHG | BODY MASS INDEX: 27.22 KG/M2 | OXYGEN SATURATION: 97 % | HEIGHT: 57 IN | TEMPERATURE: 96.6 F | RESPIRATION RATE: 18 BRPM

## 2022-03-21 DIAGNOSIS — M81.0 AGE-RELATED OSTEOPOROSIS WITHOUT CURRENT PATHOLOGICAL FRACTURE: ICD-10-CM

## 2022-03-21 DIAGNOSIS — E03.9 HYPOTHYROIDISM, UNSPECIFIED TYPE: ICD-10-CM

## 2022-03-21 DIAGNOSIS — Z00.00 ENCOUNTER FOR MEDICARE ANNUAL WELLNESS EXAM: ICD-10-CM

## 2022-03-21 DIAGNOSIS — R73.9 ELEVATED BLOOD SUGAR: ICD-10-CM

## 2022-03-21 DIAGNOSIS — D75.839 THROMBOCYTOSIS: ICD-10-CM

## 2022-03-21 DIAGNOSIS — I10 HTN (HYPERTENSION), BENIGN: ICD-10-CM

## 2022-03-21 DIAGNOSIS — Z23 PNEUMOCOCCAL VACCINE ADMINISTERED: ICD-10-CM

## 2022-03-21 DIAGNOSIS — E78.2 MIXED HYPERLIPIDEMIA: ICD-10-CM

## 2022-03-21 DIAGNOSIS — Z23 NEED FOR PNEUMOCOCCAL VACCINATION: ICD-10-CM

## 2022-03-21 DIAGNOSIS — E55.9 VITAMIN D INSUFFICIENCY: ICD-10-CM

## 2022-03-21 DIAGNOSIS — I10 ESSENTIAL HYPERTENSION: Primary | ICD-10-CM

## 2022-03-21 DIAGNOSIS — Z13.9 ENCOUNTER FOR SCREENING INVOLVING SOCIAL DETERMINANTS OF HEALTH (SDOH): ICD-10-CM

## 2022-03-21 DIAGNOSIS — Z79.899 ENCOUNTER FOR LONG-TERM (CURRENT) USE OF MEDICATIONS: ICD-10-CM

## 2022-03-21 DIAGNOSIS — D47.3 ESSENTIAL (HEMORRHAGIC) THROMBOCYTHEMIA (HCC): ICD-10-CM

## 2022-03-21 DIAGNOSIS — M35.3 POLYMYALGIA RHEUMATICA (HCC): ICD-10-CM

## 2022-03-21 PROCEDURE — 99214 OFFICE O/P EST MOD 30 MIN: CPT | Performed by: FAMILY MEDICINE

## 2022-03-21 PROCEDURE — 1123F ACP DISCUSS/DSCN MKR DOCD: CPT | Performed by: FAMILY MEDICINE

## 2022-03-21 PROCEDURE — 90732 PPSV23 VACC 2 YRS+ SUBQ/IM: CPT

## 2022-03-21 PROCEDURE — G0009 ADMIN PNEUMOCOCCAL VACCINE: HCPCS

## 2022-03-21 PROCEDURE — G0439 PPPS, SUBSEQ VISIT: HCPCS | Performed by: FAMILY MEDICINE

## 2022-03-21 RX ORDER — TELMISARTAN 80 MG/1
80 TABLET ORAL DAILY
Qty: 30 TABLET | Refills: 6 | Status: SHIPPED | OUTPATIENT
Start: 2022-03-21

## 2022-03-21 NOTE — PATIENT INSTRUCTIONS
Medicare Preventive Visit Patient Instructions  Thank you for completing your Welcome to Medicare Visit or Medicare Annual Wellness Visit today  Your next wellness visit will be due in one year (3/22/2023)  The screening/preventive services that you may require over the next 5-10 years are detailed below  Some tests may not apply to you based off risk factors and/or age  Screening tests ordered at today's visit but not completed yet may show as past due  Also, please note that scanned in results may not display below  Preventive Screenings:  Service Recommendations Previous Testing/Comments   Colorectal Cancer Screening  * Colonoscopy    * Fecal Occult Blood Test (FOBT)/Fecal Immunochemical Test (FIT)  * Fecal DNA/Cologuard Test  * Flexible Sigmoidoscopy Age: 54-65 years old   Colonoscopy: every 10 years (may be performed more frequently if at higher risk)  OR  FOBT/FIT: every 1 year  OR  Cologuard: every 3 years  OR  Sigmoidoscopy: every 5 years  Screening may be recommended earlier than age 48 if at higher risk for colorectal cancer  Also, an individualized decision between you and your healthcare provider will decide whether screening between the ages of 74-80 would be appropriate  Colonoscopy: 05/14/2019  FOBT/FIT: Not on file  Cologuard: Not on file  Sigmoidoscopy: Not on file    Screening Current     Breast Cancer Screening Age: 36 years old  Frequency: every 1-2 years  Not required if history of left and right mastectomy Mammogram: 05/17/2021    Screening Current   Cervical Cancer Screening Between the ages of 21-29, pap smear recommended once every 3 years  Between the ages of 33-67, can perform pap smear with HPV co-testing every 5 years     Recommendations may differ for women with a history of total hysterectomy, cervical cancer, or abnormal pap smears in past  Pap Smear: 05/03/2021    Screening Not Indicated   Hepatitis C Screening Once for adults born between 1945 and 1965  More frequently in patients at high risk for Hepatitis C Hep C Antibody: 10/16/2018    Screening Current   Diabetes Screening 1-2 times per year if you're at risk for diabetes or have pre-diabetes Fasting glucose: 88 mg/dL   A1C: 5 6 %    Screening Current   Cholesterol Screening Once every 5 years if you don't have a lipid disorder  May order more often based on risk factors  Lipid panel: 11/22/2021    Screening Not Indicated  History Lipid Disorder     Other Preventive Screenings Covered by Medicare:  1  Abdominal Aortic Aneurysm (AAA) Screening: covered once if your at risk  You're considered to be at risk if you have a family history of AAA  2  Lung Cancer Screening: covers low dose CT scan once per year if you meet all of the following conditions: (1) Age 50-69; (2) No signs or symptoms of lung cancer; (3) Current smoker or have quit smoking within the last 15 years; (4) You have a tobacco smoking history of at least 30 pack years (packs per day multiplied by number of years you smoked); (5) You get a written order from a healthcare provider  3  Glaucoma Screening: covered annually if you're considered high risk: (1) You have diabetes OR (2) Family history of glaucoma OR (3)  aged 48 and older OR (3)  American aged 72 and older  3  Osteoporosis Screening: covered every 2 years if you meet one of the following conditions: (1) You're estrogen deficient and at risk for osteoporosis based off medical history and other findings; (2) Have a vertebral abnormality; (3) On glucocorticoid therapy for more than 3 months; (4) Have primary hyperparathyroidism; (5) On osteoporosis medications and need to assess response to drug therapy  · Last bone density test (DXA Scan): 05/17/2021   5  HIV Screening: covered annually if you're between the age of 15-65  Also covered annually if you are younger than 13 and older than 72 with risk factors for HIV infection   For pregnant patients, it is covered up to 3 times per pregnancy  Immunizations:  Immunization Recommendations   Influenza Vaccine Annual influenza vaccination during flu season is recommended for all persons aged >= 6 months who do not have contraindications   Pneumococcal Vaccine (Prevnar and Pneumovax)  * Prevnar = PCV13  * Pneumovax = PPSV23   Adults 25-60 years old: 1-3 doses may be recommended based on certain risk factors  Adults 72 years old: Prevnar (PCV13) vaccine recommended followed by Pneumovax (PPSV23) vaccine  If already received PPSV23 since turning 65, then PCV13 recommended at least one year after PPSV23 dose  Hepatitis B Vaccine 3 dose series if at intermediate or high risk (ex: diabetes, end stage renal disease, liver disease)   Tetanus (Td) Vaccine - COST NOT COVERED BY MEDICARE PART B Following completion of primary series, a booster dose should be given every 10 years to maintain immunity against tetanus  Td may also be given as tetanus wound prophylaxis  Tdap Vaccine - COST NOT COVERED BY MEDICARE PART B Recommended at least once for all adults  For pregnant patients, recommended with each pregnancy  Shingles Vaccine (Shingrix) - COST NOT COVERED BY MEDICARE PART B  2 shot series recommended in those aged 48 and above     Health Maintenance Due:      Topic Date Due    Breast Cancer Screening: Mammogram  05/17/2022    Colorectal Cancer Screening  05/14/2029    Hepatitis C Screening  Completed     Immunizations Due:      Topic Date Due    Pneumococcal Vaccine: 65+ Years (1 of 1 - PPSV23) Never done    COVID-19 Vaccine (2 - Booster for [employer] series) 05/27/2021     Advance Directives   What are advance directives? Advance directives are legal documents that state your wishes and plans for medical care  These plans are made ahead of time in case you lose your ability to make decisions for yourself  Advance directives can apply to any medical decision, such as the treatments you want, and if you want to donate organs     What are the types of advance directives? There are many types of advance directives, and each state has rules about how to use them  You may choose a combination of any of the following:  · Living will: This is a written record of the treatment you want  You can also choose which treatments you do not want, which to limit, and which to stop at a certain time  This includes surgery, medicine, IV fluid, and tube feedings  · Durable power of  for healthcare [facility]): This is a written record that states who you want to make healthcare choices for you when you are unable to make them for yourself  This person, called a proxy, is usually a family member or a friend  You may choose more than 1 proxy  · Do not resuscitate (DNR) order:  A DNR order is used in case your heart stops beating or you stop breathing  It is a request not to have certain forms of treatment, such as CPR  A DNR order may be included in other types of advance directives  · Medical directive: This covers the care that you want if you are in a coma, near death, or unable to make decisions for yourself  You can list the treatments you want for each condition  Treatment may include pain medicine, surgery, blood transfusions, dialysis, IV or tube feedings, and a ventilator (breathing machine)  · Values history: This document has questions about your views, beliefs, and how you feel and think about life  This information can help others choose the care that you would choose  Why are advance directives important? An advance directive helps you control your care  Although spoken wishes may be used, it is better to have your wishes written down  Spoken wishes can be misunderstood, or not followed  Treatments may be given even if you do not want them  An advance directive may make it easier for your family to make difficult choices about your care     Weight Management   Why it is important to manage your weight:  Being overweight increases your risk of health conditions such as heart disease, high blood pressure, type 2 diabetes, and certain types of cancer  It can also increase your risk for osteoarthritis, sleep apnea, and other respiratory problems  Aim for a slow, steady weight loss  Even a small amount of weight loss can lower your risk of health problems  How to lose weight safely:  A safe and healthy way to lose weight is to eat fewer calories and get regular exercise  You can lose up about 1 pound a week by decreasing the number of calories you eat by 500 calories each day  Healthy meal plan for weight management:  A healthy meal plan includes a variety of foods, contains fewer calories, and helps you stay healthy  A healthy meal plan includes the following:  · Eat whole-grain foods more often  A healthy meal plan should contain fiber  Fiber is the part of grains, fruits, and vegetables that is not broken down by your body  Whole-grain foods are healthy and provide extra fiber in your diet  Some examples of whole-grain foods are whole-wheat breads and pastas, oatmeal, brown rice, and bulgur  · Eat a variety of vegetables every day  Include dark, leafy greens such as spinach, kale, juan antonio greens, and mustard greens  Eat yellow and orange vegetables such as carrots, sweet potatoes, and winter squash  · Eat a variety of fruits every day  Choose fresh or canned fruit (canned in its own juice or light syrup) instead of juice  Fruit juice has very little or no fiber  · Eat low-fat dairy foods  Drink fat-free (skim) milk or 1% milk  Eat fat-free yogurt and low-fat cottage cheese  Try low-fat cheeses such as mozzarella and other reduced-fat cheeses  · Choose meat and other protein foods that are low in fat  Choose beans or other legumes such as split peas or lentils  Choose fish, skinless poultry (chicken or turkey), or lean cuts of red meat (beef or pork)  Before you cook meat or poultry, cut off any visible fat  · Use less fat and oil    Try baking foods instead of frying them  Add less fat, such as margarine, sour cream, regular salad dressing and mayonnaise to foods  Eat fewer high-fat foods  Some examples of high-fat foods include french fries, doughnuts, ice cream, and cakes  · Eat fewer sweets  Limit foods and drinks that are high in sugar  This includes candy, cookies, regular soda, and sweetened drinks  Exercise:  Exercise at least 30 minutes per day on most days of the week  Some examples of exercise include walking, biking, dancing, and swimming  You can also fit in more physical activity by taking the stairs instead of the elevator or parking farther away from stores  Ask your healthcare provider about the best exercise plan for you  © Copyright [address] 2018 Information is for End User's use only and may not be sold, redistributed or otherwise used for commercial purposes  All illustrations and images included in CareNotes® are the copyrighted property of [employer] , [employer]  or [address]:   A Mediterranean diet  is a meal plan that includes foods that are commonly eaten in countries that border the [facility]  This meal plan may provide several health benefits  These include losing or maintaining weight, and decreasing blood pressure, blood sugar, and cholesterol levels  It may also help protect against certain health conditions such as heart disease, cancer, type 2 diabetes, and Alzheimer disease  Work with a dietitian to develop a meal plan that is right for you  Foods to include in the [facility] diet:   · Include fruits and vegetables in each meal   Eat a variety of fresh fruits and vegetables  · Choose whole grains every day  These foods include whole-grain breads, pastas, and cereals  It also includes brown rice, quinoa, and millet  · Use unsaturated fats instead of saturated fats  Cook with olive or canola oil   Limit saturated fats, such as butter, margarine, and shortening  Saturated fat is an unhealthy fat that can increase your cholesterol levels  · Choose plant foods, poultry, and fish as your main sources of protein  ? Eat plant-based foods that provide protein,  such as lentils, beans, chickpeas, nuts, and seeds  Choose mostly plant-based foods in place of meat on most days of the week  ? Eat protein foods high in omega-3 fats  Fish high in omega-3 fats include salmon, trout, and tuna  Include these types of fish 1 or 2 times each week  Limit fish high in mercury, such as shark, swordfish, tilefish, and rafael mackerel  Omega-3 fats are also found in walnuts and flaxseed  ? Choose poultry (chicken or turkey)  without skin instead of red meat  Red meat is high in saturated fat  Limit eggs and high-fat meats, such as [place], sausage, and hot dogs  · Choose low-fat dairy foods  such as nonfat or 1% milk, or low-fat almond, cashew, or soy milk  Other examples include low-fat cheese, yogurt, and cottage cheese  · Limit sweets  Limit your intake of high-sugar foods, such as soda, desserts, and candy  · Talk to your healthcare provider about alcohol  Studies have shown that moderate intake of wine may reduce the risk of heart disease  A moderate amount of wine is 1 serving for women and men 65 years and older each day  Two servings is recommended for men 24to 59years of age each day  A serving of wine is 5 ounces  Other things you need to know if you follow the Mediterranean diet:   · Include foods high in iron and vitamin C   Plant-based foods that are high in iron include spinach, beans, tofu, and artichoke  Eat a serving of vitamin C with any iron-rich food to help your body absorb more iron  Examples include oranges, strawberries, cantaloupe, broccoli, and yellow peppers  · Get regular physical activity  The Mediterranean diet will have the most benefit if you get regular physical activity   Get 30 minutes of physical activity at least 5 days a week  Choose physical activities that increase your heart rate  Examples include walking, hiking, swimming, and riding a bike  Ask your healthcare provider about the best exercise plan for you  © Copyright [employer] 2022 Information is for End User's use only and may not be sold, redistributed or otherwise used for commercial purposes  All illustrations and images included in CareNotes® are the copyrighted property of A D A M [employer] Inc  or Leelee Metzger  The above information is an  only  It is not intended as medical advice for individual conditions or treatments  Talk to your doctor, nurse or pharmacist before following any medical regimen to see if it is safe and effective for you  Chronic Hypertension   WHAT YOU NEED TO KNOW:   Hypertension is high blood pressure  Your blood pressure is the force of your blood moving against the walls of your arteries  Hypertension causes your blood pressure to get so high that your heart has to work much harder than normal  This can damage your heart  Even if you have hypertension for years, lifestyle changes, medicines, or both can help bring your blood pressure to normal   DISCHARGE INSTRUCTIONS:   Call your local emergency number (911 in the [address] [place] [address],[address]) or have someone call if:   · You have chest pain  · You have any of the following signs of a heart attack:      ? Squeezing, pressure, or pain in your chest    ? You may  also have any of the following:     § Discomfort or pain in your back, neck, jaw, stomach, or arm    § Shortness of breath    § Nausea or vomiting    § Lightheadedness or a sudden cold sweat    · You become confused or have difficulty speaking  · You suddenly feel lightheaded or have trouble breathing  Return to the emergency department if:   · You have a severe headache or vision loss  · You have weakness in an arm or leg      Call your doctor or cardiologist if:   · You feel faint, dizzy, confused, or drowsy  · You have been taking your blood pressure medicine but your pressure is higher than your provider says it should be  · You have questions or concerns about your condition or care  Medicines: You may need any of the following:  · Antihypertensives  may be used to help lower your blood pressure  Several kinds of medicines are available  Your healthcare provider may change the medicine or medicines you currently take  This may be needed if your blood pressure is often high when you check it at home or you are having other problems with blood pressure control  · Diuretics  help decrease extra fluid that collects in your body  This will help lower your BP  You may urinate more often while you take this medicine  · Cholesterol medicine  helps lower your cholesterol level  A low cholesterol level helps prevent heart disease and makes it easier to control your blood pressure  · Take your medicine as directed  Contact your healthcare provider if you think your medicine is not helping or if you have side effects  Tell him or her if you are allergic to any medicine  Keep a list of the medicines, vitamins, and herbs you take  Include the amounts, and when and why you take them  Bring the list or the pill bottles to follow-up visits  Carry your medicine list with you in case of an emergency  Stages of hypertension:       · Normal blood pressure is 119/79 or lower   Your healthcare provider may only check your blood pressure each year if it stays at a normal level  · Elevated blood pressure is 120/79 to 129/79   This is sometimes called prehypertension  Your healthcare provider may suggest lifestyle changes to help lower your blood pressure to a normal level  He or she may then check it again in 3 to 6 months  · Stage 1 hypertension is 130/80  to 139/89    Your provider may recommend lifestyle changes, medication, and checks every 3 to 6 months until your blood pressure is controlled  · Stage 2 hypertension is 140/90 or higher   Your provider will recommend lifestyle changes and have you take 2 kinds of hypertension medicines  You will also need to have your blood pressure checked monthly until it is controlled  Manage chronic hypertension:   · Check your blood pressure at home  Avoid smoking, caffeine, and exercise at least 30 minutes before checking your blood pressure  Sit and rest for 5 minutes before you take your blood pressure  Extend your arm and support it on a flat surface  Your arm should be at the same level as your heart  Follow the directions that came with your blood pressure monitor  Check your blood pressure 2 times, 1 minute apart, before you take your medicine in the morning  Also check your blood pressure before your evening meal  Keep a record of your readings and bring it to your follow-up visits  Ask your healthcare provider what your blood pressure should be  · Manage any other health conditions you have  Health conditions such as diabetes can increase your risk for hypertension  Follow your healthcare provider's instructions and take all your medicines as directed  Talk to your healthcare provider about any new health conditions you have recently developed  · Ask about all medicines  Certain medicines can increase your blood pressure  Examples include oral birth control pills, decongestants, herbal supplements, and NSAIDs, such as ibuprofen  Your healthcare provider can tell you which medicines are safe for you to take  This includes prescription and over-the-counter medicines  Lifestyle changes you can make to lower your blood pressure: Your provider may want you to make more lifestyle changes if you are having trouble controlling your blood pressure  This may feel difficult over time, especially if you think you are making good changes but your pressure is still high  It might help to focus on one new change at a time   For example, try to add 1 more day of exercise, or exercise for an extra 10 minutes on 2 days  Small changes can make a big difference  Your healthcare provider can also refer you to specialists such as a dietitian who can help you make small changes  Your family members may be included in helping you learn to create lifestyle changes, such as the following:     · Limit sodium (salt) as directed  Too much sodium can affect your fluid balance  Check labels to find low-sodium or no-salt-added foods  Some low-sodium foods use potassium salts for flavor  Too much potassium can also cause health problems  Your healthcare provider will tell you how much sodium and potassium are safe for you to have in a day  He or she may recommend that you limit sodium to 2,300 mg a day  · Follow the meal plan recommended by your healthcare provider  A dietitian or your provider can give you more information on low-sodium plans or the DASH (Dietary Approaches to Stop Hypertension) eating plan  The DASH plan is low in sodium, processed sugar, unhealthy fats, and total fat  It is high in potassium, calcium, and fiber  These can be found in vegetables, fruit, and whole-grain foods  · Be physically active throughout the day  Physical activity, such as exercise, can help control your blood pressure and your weight  Be physically active for at least 30 minutes per day, on most days of the week  Include aerobic activity, such as walking or riding a bicycle  Also include strength training at least 2 times each week  Your healthcare providers can help you create a physical activity plan  · Decrease stress  This may help lower your blood pressure  Learn ways to relax, such as deep breathing or listening to music  · Limit alcohol as directed  Alcohol can increase your blood pressure  A drink of alcohol is 12 ounces of beer, 5 ounces of wine, or 1½ ounces of liquor  · Do not smoke    Nicotine and other chemicals in cigarettes and cigars can increase your blood pressure and also cause lung damage  Ask your healthcare provider for information if you currently smoke and need help to quit  E-cigarettes or smokeless tobacco still contain nicotine  Talk to your healthcare provider before you use these products  Follow up with your doctor or cardiologist as directed: You will need to return to have your blood pressure checked and to have other lab tests done  Write down your questions so you remember to ask them during your visits  © Copyright [employer] 2022 Information is for End User's use only and may not be sold, redistributed or otherwise used for commercial purposes  All illustrations and images included in CareNotes® are the copyrighted property of A D A M , Inc  or [facility] [employer]   The above information is an  only  It is not intended as medical advice for individual conditions or treatments  Talk to your doctor, nurse or pharmacist before following any medical regimen to see if it is safe and effective for you  Polymyalgia Rheumatica   WHAT YOU NEED TO KNOW:   Polymyalgia rheumatica is a condition that causes muscle pain and stiffness from inflammation  The symptoms are worst after you have not used the muscles for a period of time  For example, it may be difficult to get out of bed when you wake up in the morning  Polymyalgia rheumatica usually affects people older than 50 years, often after age 79  Polymyalgia rheumatica can occur with a serious condition called giant cell arteritis, or temporal arteritis  This condition causes the walls of arteries to swell  DISCHARGE INSTRUCTIONS:   Return to the emergency department if:   · You have signs or symptoms of giant cell arteritis, such as a headache, jaw pain, vision changes, or a fever         Call your doctor or rheumatologist if:   · You have new or returning signs or symptoms of polymyalgia rheumatica while the steroid medicine is being lowered  · You have questions or concerns about your condition or care  Medicines: You may need any of the following:  · Steroid medicine  must be taken exactly as directed  Do not suddenly stop taking this medicine  Your healthcare provider will need to lower your dose slowly over time  · NSAIDs  help decrease swelling and pain or fever  This medicine is available with or without a doctor's order  NSAIDs can cause stomach bleeding or kidney problems in certain people  If you take blood thinner medicine, always ask your healthcare provider if NSAIDs are safe for you  Always read the medicine label and follow directions  · Take your medicine as directed  Contact your healthcare provider if you think your medicine is not helping or if you have side effects  Tell him or her if you are allergic to any medicine  Keep a list of the medicines, vitamins, and herbs you take  Include the amounts, and when and why you take them  Bring the list or the pill bottles to follow-up visits  Carry your medicine list with you in case of an emergency  Manage polymyalgia rheumatica:   · Exercise as directed  Exercise can help prevent or reduce pain  Exercise can also help you keep muscle mass and prevent falls  · Go to physical therapy as directed  A physical therapist can teach you exercises to help keep muscle mass  He or she can also teach you exercises to improve range of motion in joints that are difficult to move  · Use assistive devices as needed  A raised toilet seat or chair can help you stand more easily  Devices are available to help you reach items on high shelves if you have trouble reaching up  Your healthcare provider may recommend a cane or walker to help you keep your balance  · Eat a variety of healthy foods  Healthy foods include fruits, vegetables, whole-grain breads, low-fat dairy products, beans, lean meats, and fish  Healthy foods can help you have more energy   Ask if you need to be on a special diet  Follow up with your healthcare provider as directed: Your healthcare provider will need to check your symptoms and adjust your steroid dose over time  He or she will also check for side effects from the medicine, such as a high blood sugar level or fast heart rate  Write down your questions so you remember to ask them during your visits  © Copyright [employer] 2022 Information is for End User's use only and may not be sold, redistributed or otherwise used for commercial purposes  All illustrations and images included in CareNotes® are the copyrighted property of A D A M , Inc  or [facility] Starla Conway   The above information is an  only  It is not intended as medical advice for individual conditions or treatments  Talk to your doctor, nurse or pharmacist before following any medical regimen to see if it is safe and effective for you  Fall Prevention for Older Adults   WHAT YOU NEED TO KNOW:   As you age, your muscles weaken and your risk for falls increases  Your risk also increases if you take medicines that make you sleepy or dizzy  You may also be at risk if you have vision or joint problems, have low blood pressure, or are not active  DISCHARGE INSTRUCTIONS:   Call 911 or have someone else call if:   · You have fallen and are unconscious  · You have fallen and cannot move part of your body  Contact your healthcare provider if:   · You have fallen and have pain or a headache  · You have questions or concerns about your condition or care  Fall prevention tips:   · Stay active  Exercise can help strengthen your muscles and improve your balance  Your healthcare provider may recommend water aerobics, walking, or Eduardo Chi  He or she may also recommend physical therapy to improve your coordination  Never start an exercise program without asking your healthcare provider first             · Wear shoes that fit well and have soles that     Wear shoes both inside and outside  Use slippers with good   Avoid shoes with high heels  · Use assistive devices as directed  Your healthcare provider may suggest that you use a cane or walker to help you keep your balance  You may need to have grab bars put in your bathroom near the toilet or in the shower  · Stand or sit up slowly  This may help you keep your balance and prevent falls  · Wear a personal alarm  This is a device that allows you to call 911 if you need help  Ask for more information on personal alarms  · Manage your medical conditions  Keep all appointments with your healthcare providers  Visit your eye doctor as directed  Home safety tips:       · Add items to prevent falls in the bathroom  Put nonslip strips on your bath or shower floor to prevent you from slipping  Use a bath mat if you do not have carpet in the bathroom  This will prevent you from falling when you step out of the bath or shower  Use a shower seat so you do not need to stand while you shower  Sit on the toilet or a chair in your bathroom to dry yourself and put on clothing  This will prevent you from losing your balance from drying or dressing yourself while you are standing  · Keep paths clear  Remove books, shoes, and other objects from walkways and stairs  Place cords for telephones and lamps out of the way so that you do not need to walk over them  Tape them down if you cannot move them  Remove small rugs  If you cannot remove a rug, secure it with double-sided tape  This will prevent you from tripping  · Install bright lights in your home  Use night lights to help light paths to the bathroom or kitchen  Always turn on the light before you start walking  · Keep items you use often on shelves within reach  Do not use a step stool to help you reach an item  · Paint or place reflective tape on the edges of your stairs  This will help you see the stairs better      Follow up with your doctor as directed:  Write down your questions so you remember to ask them during your visits  © Copyright [employer] 2022 Information is for End User's use only and may not be sold, redistributed or otherwise used for commercial purposes  All illustrations and images included in CareNotes® are the copyrighted property of [employer] M [employer] Inc  or Leelee Metzger  The above information is an  only  It is not intended as medical advice for individual conditions or treatments  Talk to your doctor, nurse or pharmacist before following any medical regimen to see if it is safe and effective for you

## 2022-03-21 NOTE — PROGRESS NOTES
BMI Counseling: Body mass index is 27 31 kg/m²  The BMI is above normal  Nutrition recommendations include decreasing portion sizes, encouraging healthy choices of fruits and vegetables, decreasing fast food intake, consuming healthier snacks, limiting drinks that contain sugar, moderation in carbohydrate intake, increasing intake of lean protein, reducing intake of saturated and trans fat and reducing intake of cholesterol  Exercise recommendations include moderate physical activity 150 minutes/week and exercising 3-5 times per week  No pharmacotherapy was ordered  Rationale for BMI follow-up plan is due to patient being overweight or obese  Depression Screening and Follow-up Plan: Patient was screened for depression during today's encounter  They screened negative with a PHQ-2 score of 0  Assessment/Plan:     1  Essential hypertension  Comments:  stable on Micardis 80  Orders:  -     telmisartan (MICARDIS) 80 MG tablet; Take 1 tablet (80 mg total) by mouth daily    2  Encounter for Medicare annual wellness exam    3  Need for pneumococcal vaccination  -     PNEUMOCOCCAL POLYSACCHARIDE VACCINE 23-VALENT =>1YO SQ IM    4  Pneumococcal vaccine administered  Comments:  Pneumo - 23 given by me    5  Encounter for long-term (current) use of medications  Comments: All of many meds discussed    6  HTN (hypertension), benign    7  Mixed hyperlipidemia  Comments:  stable on Crestor 20    8  Polymyalgia rheumatica (HCC)  Comments:  off prednisone  tolerating Celebrex    9  Hypothyroidism, unspecified type  Comments:  stable on synthroid 50 mcg OD    10  Thrombocytosis  Comments:  Finally is seeing hemo/onc re this and stable  "Watchful waiting"  Last plt ct was finally WNL:  389 cf 500,000 1-2 yrs ago    11  Age-related osteoporosis without current pathological fracture  Comments: Tolerating Prolia x many yrs  Last DEXA was May, 2021: lowest T-score: -3 2    12   Encounter for screening involving social determinants of health (SDoH)  Comments:  Walks with walker only  Spinal kyphosis very limiting of her ADL's    13  Vitamin D insufficiency  Comments:  Vit D was 22 in Nov, 2021 ==>now taking 5,000 IU OD  Anjali value at 6 mo oskar - May          Subjective:      Patient ID: Loida Bills is a 81 y o  female  HPI    The following portions of the patient's history were reviewed and updated as appropriate: She  has a past medical history of Arthritis, Hernia, umbilical, Hypertension, and Spinal stenosis  She   Patient Active Problem List    Diagnosis Date Noted    Irritable bowel syndrome with diarrhea 03/15/2021    Increased BMI 03/15/2021    Polymyalgia rheumatica ([address] ) 07/30/2020    Arthritis     Hernia, umbilical     Spinal stenosis 04/20/2020    Hypertension 04/20/2020    Iron deficiency anemia, unspecified 01/08/2019    Thrombocytosis 08/07/2018    Anemia 08/07/2018     She  has a past surgical history that includes Hysterectomy; Foot surgery; Hernia repair; Tonsillectomy; Mammo (historical) (2017); and Breast cyst excision (Left, 1998)  Her family history includes Breast cancer in her maternal grandmother; Cancer in her father; Colon cancer (age of onset: 62) in her father; Hypertension in her mother; No Known Problems in her maternal aunt, maternal aunt, maternal aunt, maternal aunt, paternal aunt, paternal aunt, sister, and sister  She  reports that she has never smoked  She has never used smokeless tobacco  She reports that she does not drink alcohol and does not use drugs    Current Outpatient Medications   Medication Sig Dispense Refill    baclofen 20 mg tablet TAKE 1 TABLET BY MOUTH FOUR TIMES DAILY 120 tablet 6    Calcium Carbonate-Vitamin D 600-200 MG-UNIT TABS Take by mouth        celecoxib (CELEBREX) 200 mg capsule Take by mouth      Cholecalciferol (VITAMIN D PO) Take 5,000 Units by mouth      denosumab (PROLIA) 60 mg/mL Inject under the skin      diphenoxylate-atropine (LOMOTIL) 2 5-0 025 mg per tablet TAKE 1 TABLET BY MOUTH TWICE DAILY 60 tablet 3    esomeprazole (NEXIUM) 40 MG capsule Take by mouth      Ferrous Sulfate (IRON PO) Take 65 mg by mouth daily      levothyroxine 50 mcg tablet TAKE 1 TABLET BY MOUTH EVERY DAY AS DIRECTED 90 tablet 3    Multiple Vitamins-Minerals (CENTRUM SILVER 50+WOMEN) TABS Take by mouth        potassium chloride (MICRO-K) 10 MEQ CR capsule TAKE 2 CAPSULES BY MOUTH TWICE DAILY AS DIRECTED 360 capsule 3    rosuvastatin (CRESTOR) 20 MG tablet TAKE 1 TABLET BY MOUTH EVERY DAY AS DIRECTED 90 tablet 3    telmisartan (MICARDIS) 80 MG tablet Take 1 tablet (80 mg total) by mouth daily 30 tablet 6    zolpidem (AMBIEN) 5 mg tablet Take by mouth       No current facility-administered medications for this visit       Current Outpatient Medications on File Prior to Visit   Medication Sig    baclofen 20 mg tablet TAKE 1 TABLET BY MOUTH FOUR TIMES DAILY    Calcium Carbonate-Vitamin D 600-200 MG-UNIT TABS Take by mouth      celecoxib (CELEBREX) 200 mg capsule Take by mouth    Cholecalciferol (VITAMIN D PO) Take 5,000 Units by mouth    denosumab (PROLIA) 60 mg/mL Inject under the skin    diphenoxylate-atropine (LOMOTIL) 2 5-0 025 mg per tablet TAKE 1 TABLET BY MOUTH TWICE DAILY    esomeprazole (NEXIUM) 40 MG capsule Take by mouth    Ferrous Sulfate (IRON PO) Take 65 mg by mouth daily    levothyroxine 50 mcg tablet TAKE 1 TABLET BY MOUTH EVERY DAY AS DIRECTED    Multiple Vitamins-Minerals (CENTRUM SILVER 50+WOMEN) TABS Take by mouth      potassium chloride (MICRO-K) 10 MEQ CR capsule TAKE 2 CAPSULES BY MOUTH TWICE DAILY AS DIRECTED    rosuvastatin (CRESTOR) 20 MG tablet TAKE 1 TABLET BY MOUTH EVERY DAY AS DIRECTED    zolpidem (AMBIEN) 5 mg tablet Take by mouth    [DISCONTINUED] predniSONE 5 mg tablet  (Patient not taking: Reported on 8/5/2021)    [DISCONTINUED] telmisartan (MICARDIS) 80 MG tablet Take 1 tablet (80 mg total) by mouth daily     No current facility-administered medications on file prior to visit  She is allergic to aspirin and ibuprofen       Review of Systems   Constitutional: Negative for activity change (in WC), appetite change, chills, diaphoresis, fatigue and fever  HENT: Negative for congestion, ear discharge, ear pain, facial swelling, nosebleeds, sore throat, tinnitus, trouble swallowing and voice change  Eyes: Negative for photophobia, pain, discharge, redness, itching and visual disturbance  Respiratory: Negative for apnea, cough, choking, chest tightness and shortness of breath  Cardiovascular: Negative for chest pain, palpitations and leg swelling  Denies any and all cardiac symptoms   Gastrointestinal: Negative for abdominal distention, abdominal pain, blood in stool, constipation, diarrhea, nausea and vomiting  Endocrine: Negative for cold intolerance, heat intolerance, polydipsia, polyphagia and polyuria  Genitourinary: Negative for decreased urine volume, difficulty urinating, dysuria, enuresis, frequency, hematuria, pelvic pain, urgency and vaginal bleeding  Musculoskeletal: Positive for arthralgias, back pain, gait problem and myalgias  Negative for joint swelling, neck pain and neck stiffness  Severe scoliosis with kyphosis throughout spine especially lumbar spine  History of PMR on prednisone  Sees Dr Moore Economy q6 mo and he is rx'ing the arthritis   Skin: Negative for color change, pallor and rash  Allergic/Immunologic: Negative for immunocompromised state  Neurological: Negative for dizziness, seizures, facial asymmetry, light-headedness, numbness and headaches  Hematological: Negative for adenopathy  Psychiatric/Behavioral: Negative for agitation, behavioral problems, confusion, decreased concentration, dysphoric mood and hallucinations           Objective:      /80   Pulse 63   Temp (!) 96 6 °F (35 9 °C)   Resp 18   Ht 4' 9" (1 448 m)   Wt 57 2 kg (126 lb 3 2 oz) SpO2 97%   BMI 27 31 kg/m²          Physical Exam  Vitals and nursing note reviewed  Constitutional:       General: She is not in acute distress  Appearance: Normal appearance  She is well-developed  She is obese  She is not ill-appearing, toxic-appearing or diaphoretic  HENT:      Head: Normocephalic and atraumatic  Nose: Nose normal    Eyes:      General: No scleral icterus  Right eye: No discharge  Left eye: No discharge  Conjunctiva/sclera: Conjunctivae normal       Pupils: Pupils are equal, round, and reactive to light  Neck:      Thyroid: No thyromegaly  Trachea: No tracheal deviation  Cardiovascular:      Rate and Rhythm: Normal rate and regular rhythm  Heart sounds: Normal heart sounds  Pulmonary:      Effort: No respiratory distress  Breath sounds: Normal breath sounds  No wheezing or rales  Chest:      Chest wall: No tenderness  Abdominal:      Palpations: Abdomen is soft  Musculoskeletal:         General: No tenderness or deformity  Normal range of motion  Cervical back: Normal range of motion and neck supple  Right lower leg: No edema  Left lower leg: No edema  Comments: Patient with many many years severe kyphoscoliosis lumbar spine  Cannot ambulate without walker and review of rheumatologist notes suggest impending surgery needed  Skin:     General: Skin is warm and dry  Coloration: Skin is not pale  Findings: No erythema or rash  Neurological:      General: No focal deficit present  Mental Status: She is alert and oriented to person, place, and time  Cranial Nerves: No cranial nerve deficit  Gait: Gait abnormal       Deep Tendon Reflexes: Reflexes abnormal    Psychiatric:         Mood and Affect: Mood normal          Behavior: Behavior normal          Thought Content:  Thought content normal          Judgment: Judgment normal              40 min with pt and , and many, many issues addressed as listed  Very complex 81 yr old medically and physically  Severely handicapped by her skeletal disabilities  Also, add 10 min Epic time also  This time was spent reviewing previous records, reviewing previous laboratory and other tests, taking history from patient, examination of patient, discussion of prognosis and treatment, ordering laboratory tests, ordering medications, and completion of the medical record

## 2022-03-21 NOTE — PROGRESS NOTES
Assessment and Plan:     Problem List Items Addressed This Visit        Hematopoietic and Hemostatic    Thrombocytosis       Other    Polymyalgia rheumatica ([facility] [address] )      Other Visit Diagnoses     Essential hypertension    -  Primary    stable on Micardis 80    Relevant Medications    telmisartan (MICARDIS) 80 MG tablet    Encounter for Medicare annual wellness exam        Need for pneumococcal vaccination        Relevant Orders    PNEUMOCOCCAL POLYSACCHARIDE VACCINE 23-VALENT =>3YO SQ IM (Completed)    Pneumococcal vaccine administered        Pneumo - 23 given by me    Encounter for long-term (current) use of medications        All of many meds discussed    HTN (hypertension), benign        Relevant Medications    telmisartan (MICARDIS) 80 MG tablet    Mixed hyperlipidemia        stable on Crestor 20    Hypothyroidism, unspecified type        stable on synthroid 50 mcg OD    Age-related osteoporosis without current pathological fracture        Tolerating Prolia x many yrs  Last DEXA was May, 2021: lowest T-score: -3 2          Depression Screening and Follow-up Plan: Patient was screened for depression during today's encounter  They screened negative with a PHQ-2 score of 0  Preventive health issues were discussed with patient, and age appropriate screening tests were ordered as noted in patient's After Visit Summary  Personalized health advice and appropriate referrals for health education or preventive services given if needed, as noted in patient's After Visit Summary       History of Present Illness:     Patient presents for Medicare Annual Wellness visit    Patient Care Team:  Milly Gaming DO as PCP - General (Family Medicine)  MD Carissa Friedman MD (Endocrinology)     Problem List:     Patient Active Problem List   Diagnosis    Thrombocytosis    Anemia    Iron deficiency anemia, unspecified    Spinal stenosis    Hypertension    Polymyalgia rheumatica ([facility] [address] )    Arthritis    Hernia, umbilical    Irritable bowel syndrome with diarrhea    Increased BMI      Past Medical and Surgical History:     Past Medical History:   Diagnosis Date    Arthritis     Hernia, umbilical     Hypertension     Spinal stenosis      Past Surgical History:   Procedure Laterality Date    BREAST CYST EXCISION Left 1998    FOOT SURGERY      Toe    HERNIA REPAIR      HYSTERECTOMY      MAMMO (HISTORICAL)  2017    TONSILLECTOMY        Family History:     Family History   Problem Relation Age of Onset    Hypertension Mother     Cancer Father     Colon cancer Father 62    No Known Problems Sister     Breast cancer Maternal Grandmother         not sure of her age of onset   [facility] No Known Problems Sister     No Known Problems Maternal Aunt     No Known Problems Maternal Aunt     No Known Problems Maternal Aunt     No Known Problems Maternal Aunt     No Known Problems Paternal Aunt     No Known Problems Paternal Aunt       Social History:     Social History     Socioeconomic History    Marital status: /Civil Union     Spouse name: None    Number of children: None    Years of education: None    Highest education level: None   Occupational History    Occupation: Retired [employer]   Tobacco Use    Smoking status: Never Smoker    Smokeless tobacco: Never Used   Vaping Use    Vaping Use: Never used   Substance and Sexual Activity    Alcohol use: No    Drug use: No    Sexual activity: Yes     Partners: Male     Birth control/protection: Post-menopausal   Other Topics Concern    None   Social History Narrative    · Most recent tobacco use screenin2019      · Occupation:   Retired -       · Caffeine intake: Moderate  Daily Tea (at least one cup)     · Illicit drugs:   None      · Guns present in home:   No      · Seat belts used routinely:   Yes      · Sunscreen used routinely:   Yes      · Smoke alarm in home:    Yes      · Advance directive:   Unsure     · Salt Intake:   Very Little      · Has the Patient had a mammogram to screen for breast cancer within 24 months:   Yes      Social Determinants of Health     Financial Resource Strain: Not on file   Food Insecurity: Not on file   Transportation Needs: Not on file   Physical Activity: Not on file   Stress: Not on file   Social Connections: Not on file   Intimate Partner Violence: Not on file   Housing Stability: Not on file      Medications and Allergies:     Current Outpatient Medications   Medication Sig Dispense Refill    baclofen 20 mg tablet TAKE 1 TABLET BY MOUTH FOUR TIMES DAILY 120 tablet 6    Calcium Carbonate-Vitamin D 600-200 MG-UNIT TABS Take by mouth        celecoxib (CELEBREX) 200 mg capsule Take by mouth      Cholecalciferol (VITAMIN D PO) Take 5,000 Units by mouth      denosumab (PROLIA) 60 mg/mL Inject under the skin      diphenoxylate-atropine (LOMOTIL) 2 5-0 025 mg per tablet TAKE 1 TABLET BY MOUTH TWICE DAILY 60 tablet 3    esomeprazole (NEXIUM) 40 MG capsule Take by mouth      Ferrous Sulfate (IRON PO) Take 65 mg by mouth daily      levothyroxine 50 mcg tablet TAKE 1 TABLET BY MOUTH EVERY DAY AS DIRECTED 90 tablet 3    Multiple Vitamins-Minerals (CENTRUM SILVER 50+WOMEN) TABS Take by mouth        potassium chloride (MICRO-K) 10 MEQ CR capsule TAKE 2 CAPSULES BY MOUTH TWICE DAILY AS DIRECTED 360 capsule 3    rosuvastatin (CRESTOR) 20 MG tablet TAKE 1 TABLET BY MOUTH EVERY DAY AS DIRECTED 90 tablet 3    telmisartan (MICARDIS) 80 MG tablet Take 1 tablet (80 mg total) by mouth daily 30 tablet 6    zolpidem (AMBIEN) 5 mg tablet Take by mouth       No current facility-administered medications for this visit       Allergies   Allergen Reactions    Aspirin     Ibuprofen       Immunizations:     Immunization History   Administered Date(s) Administered    COVID-19 J&J (SafeTacMag) vaccine 0 5 mL 04/01/2021    INFLUENZA 11/03/2014, 10/26/2015, 10/27/2016, 10/31/2017, 10/16/2018    Influenza, high dose seasonal 0 7 mL 11/02/2020, 11/15/2021    Pneumococcal Polysaccharide PPV23 03/21/2022      Health Maintenance:         Topic Date Due    Breast Cancer Screening: Mammogram  05/17/2022    Colorectal Cancer Screening  05/14/2029    Hepatitis C Screening  Completed         Topic Date Due    COVID-19 Vaccine (2 - Booster for CompassMD series) 05/27/2021      Medicare Health Risk Assessment:     /80   Pulse 63   Temp (!) 96 6 °F (35 9 °C)   Resp 18   Ht 4' 9" (1 448 m)   Wt 57 2 kg (126 lb 3 2 oz)   SpO2 97%   BMI 27 31 kg/m²      Kia Newell is here for her Initial Wellness visit  Health Risk Assessment:   Patient rates overall health as very good  Patient feels that their physical health rating is same  Patient is satisfied with their life  Eyesight was rated as same  Hearing was rated as same  Patient feels that their emotional and mental health rating is same  Patients states they are never, rarely angry  Patient states they are never, rarely unusually tired/fatigued  Pain experienced in the last 7 days has been none  Patient states that she has experienced no weight loss or gain in last 6 months  Depression Screening:   PHQ-2 Score: 0      Fall Risk Screening: In the past year, patient has experienced: no history of falling in past year      Urinary Incontinence Screening:   Patient has not leaked urine accidently in the last six months  Home Safety:  Patient has trouble with stairs inside or outside of their home  Patient has working smoke alarms and has no working carbon monoxide detector  Home safety hazards include: none  Lives in [facility], doesn't know if CO monitors are on, but thinks so -- well ck    Nutrition:   Current diet is Regular  Medications:   Patient is currently taking over-the-counter supplements  OTC medications include: see medication list  Patient is able to manage medications       Activities of Daily Living (ADLs)/Instrumental Activities of Daily Living (IADLs):   Walk and transfer into and out of bed and chair?: Yes  Dress and groom yourself?: Yes    Bathe or shower yourself?: Yes    Feed yourself? Yes  Do your laundry/housekeeping?: Yes  Manage your money, pay your bills and track your expenses?: Yes  Make your own meals?: Yes    Do your own shopping?: Yes    Previous Hospitalizations:   Any hospitalizations or ED visits within the last 12 months?: No      Advance Care Planning:   Living will: Yes    Durable POA for healthcare: No    Advanced directive: Yes    Advanced directive counseling given: Yes    Five wishes given: Yes    Patient declined ACP directive: No    End of Life Decisions reviewed with patient: Yes    Provider agrees with end of life decisions: Yes      Cognitive Screening:   Provider or family/friend/caregiver concerned regarding cognition?: No    PREVENTIVE SCREENINGS      Cardiovascular Screening:    General: Screening Not Indicated, History Lipid Disorder and Risks and Benefits Discussed      Diabetes Screening:     General: Screening Current and Risks and Benefits Discussed      Colorectal Cancer Screening:     General: Screening Current      Breast Cancer Screening:     General: Screening Current    Due for: Mammogram        Cervical Cancer Screening:    General: Screening Not Indicated and Risks and Benefits Discussed      Osteoporosis Screening:    General: Screening Not Indicated, History Osteoporosis and Risks and Benefits Discussed      Abdominal Aortic Aneurysm (AAA) Screening:        General: Risks and Benefits Discussed      Lung Cancer Screening:     General: Screening Not Indicated and Risks and Benefits Discussed      Hepatitis C Screening:    General: Screening Current    Screening, Brief Intervention, and Referral to Treatment (SBIRT)    Screening  Typical number of drinks in a day: 0  Typical number of drinks in a week: 0  Interpretation: Low risk drinking behavior      Single Item Drug Screening:  How often have you used an illegal drug (including marijuana) or a prescription medication for non-medical reasons in the past year? never    Single Item Drug Screen Score: 0  Interpretation: Negative screen for possible drug use disorder    Brief Intervention  Alcohol & drug use screenings were reviewed  No concerns regarding substance use disorder identified  Healthy alcohol use/limits discussed  Other Counseling Topics:   Car/seat belt/driving safety, skin self-exam, sunscreen and calcium and vitamin D intake and regular weightbearing exercise         Maryan Alas, DO

## 2022-04-04 ENCOUNTER — TELEPHONE (OUTPATIENT)
Dept: FAMILY MEDICINE CLINIC | Facility: CLINIC | Age: 72
End: 2022-04-04

## 2022-04-04 NOTE — TELEPHONE ENCOUNTER
Patient called she got the lab order she has question about the a1c and the blood sugar he has them because of high blood sugar and her told her her levels  were fine? Also is there a calcium level?     Please advise thank you

## 2022-04-05 NOTE — TELEPHONE ENCOUNTER
Called and left message for patient with above message if she had any questions to please call back   And clarify for us

## 2022-04-13 ENCOUNTER — APPOINTMENT (OUTPATIENT)
Dept: LAB | Facility: CLINIC | Age: 72
End: 2022-04-13
Payer: MEDICARE

## 2022-04-13 DIAGNOSIS — I10 ESSENTIAL HYPERTENSION: ICD-10-CM

## 2022-04-13 DIAGNOSIS — M81.0 AGE-RELATED OSTEOPOROSIS WITHOUT CURRENT PATHOLOGICAL FRACTURE: ICD-10-CM

## 2022-04-13 DIAGNOSIS — Z79.899 ENCOUNTER FOR LONG-TERM (CURRENT) USE OF MEDICATIONS: ICD-10-CM

## 2022-04-13 DIAGNOSIS — D75.839 THROMBOCYTOSIS: ICD-10-CM

## 2022-04-13 DIAGNOSIS — R73.9 ELEVATED BLOOD SUGAR: ICD-10-CM

## 2022-04-13 DIAGNOSIS — E55.9 VITAMIN D INSUFFICIENCY: ICD-10-CM

## 2022-04-13 DIAGNOSIS — I10 HTN (HYPERTENSION), BENIGN: ICD-10-CM

## 2022-04-13 DIAGNOSIS — E03.9 HYPOTHYROIDISM, UNSPECIFIED TYPE: ICD-10-CM

## 2022-04-13 DIAGNOSIS — M35.3 POLYMYALGIA RHEUMATICA (HCC): ICD-10-CM

## 2022-04-13 LAB
25(OH)D3 SERPL-MCNC: 32.6 NG/ML (ref 30–100)
ALBUMIN SERPL BCP-MCNC: 3.4 G/DL (ref 3.5–5)
ALP SERPL-CCNC: 92 U/L (ref 46–116)
ALT SERPL W P-5'-P-CCNC: 21 U/L (ref 12–78)
ANION GAP SERPL CALCULATED.3IONS-SCNC: 13 MMOL/L (ref 4–13)
AST SERPL W P-5'-P-CCNC: 19 U/L (ref 5–45)
BILIRUB SERPL-MCNC: 0.61 MG/DL (ref 0.2–1)
BILIRUB UR QL STRIP: NEGATIVE
BUN SERPL-MCNC: 18 MG/DL (ref 5–25)
CALCIUM ALBUM COR SERPL-MCNC: 9.6 MG/DL (ref 8.3–10.1)
CALCIUM SERPL-MCNC: 9.1 MG/DL (ref 8.3–10.1)
CHLORIDE SERPL-SCNC: 105 MMOL/L (ref 100–108)
CHOLEST SERPL-MCNC: 161 MG/DL
CLARITY UR: CLEAR
CO2 SERPL-SCNC: 24 MMOL/L (ref 21–32)
COLOR UR: NORMAL
CREAT SERPL-MCNC: 0.81 MG/DL (ref 0.6–1.3)
CRP SERPL QL: <3 MG/L
ERYTHROCYTE [DISTWIDTH] IN BLOOD BY AUTOMATED COUNT: 13.3 % (ref 11.6–15.1)
EST. AVERAGE GLUCOSE BLD GHB EST-MCNC: 111 MG/DL
GFR SERPL CREATININE-BSD FRML MDRD: 73 ML/MIN/1.73SQ M
GLUCOSE P FAST SERPL-MCNC: 105 MG/DL (ref 65–99)
GLUCOSE UR STRIP-MCNC: NEGATIVE MG/DL
HBA1C MFR BLD: 5.5 %
HCT VFR BLD AUTO: 39.9 % (ref 34.8–46.1)
HDLC SERPL-MCNC: 63 MG/DL
HGB BLD-MCNC: 12.8 G/DL (ref 11.5–15.4)
HGB UR QL STRIP.AUTO: NEGATIVE
KETONES UR STRIP-MCNC: NEGATIVE MG/DL
LDLC SERPL CALC-MCNC: 85 MG/DL (ref 0–100)
LEUKOCYTE ESTERASE UR QL STRIP: NEGATIVE
MCH RBC QN AUTO: 30.3 PG (ref 26.8–34.3)
MCHC RBC AUTO-ENTMCNC: 32.1 G/DL (ref 31.4–37.4)
MCV RBC AUTO: 94 FL (ref 82–98)
NITRITE UR QL STRIP: NEGATIVE
NONHDLC SERPL-MCNC: 98 MG/DL
PH UR STRIP.AUTO: 6.5 [PH]
PLATELET # BLD AUTO: 385 THOUSANDS/UL (ref 149–390)
PMV BLD AUTO: 9 FL (ref 8.9–12.7)
POTASSIUM SERPL-SCNC: 4.1 MMOL/L (ref 3.5–5.3)
PROT SERPL-MCNC: 7.4 G/DL (ref 6.4–8.2)
PROT UR STRIP-MCNC: NEGATIVE MG/DL
RBC # BLD AUTO: 4.23 MILLION/UL (ref 3.81–5.12)
SODIUM SERPL-SCNC: 142 MMOL/L (ref 136–145)
SP GR UR STRIP.AUTO: 1.01 (ref 1–1.03)
TRIGL SERPL-MCNC: 65 MG/DL
TSH SERPL DL<=0.05 MIU/L-ACNC: 3 UIU/ML (ref 0.45–4.5)
UROBILINOGEN UR QL STRIP.AUTO: 0.2 E.U./DL
WBC # BLD AUTO: 4.5 THOUSAND/UL (ref 4.31–10.16)

## 2022-04-13 PROCEDURE — 84443 ASSAY THYROID STIM HORMONE: CPT

## 2022-04-13 PROCEDURE — 83036 HEMOGLOBIN GLYCOSYLATED A1C: CPT

## 2022-04-13 PROCEDURE — 80053 COMPREHEN METABOLIC PANEL: CPT

## 2022-04-13 PROCEDURE — 80061 LIPID PANEL: CPT

## 2022-04-13 PROCEDURE — 82306 VITAMIN D 25 HYDROXY: CPT

## 2022-04-13 PROCEDURE — 81003 URINALYSIS AUTO W/O SCOPE: CPT | Performed by: FAMILY MEDICINE

## 2022-04-13 PROCEDURE — 85027 COMPLETE CBC AUTOMATED: CPT

## 2022-04-13 PROCEDURE — 86140 C-REACTIVE PROTEIN: CPT

## 2022-04-13 PROCEDURE — 36415 COLL VENOUS BLD VENIPUNCTURE: CPT

## 2022-04-18 ENCOUNTER — TELEPHONE (OUTPATIENT)
Dept: FAMILY MEDICINE CLINIC | Facility: CLINIC | Age: 72
End: 2022-04-18

## 2022-04-18 NOTE — TELEPHONE ENCOUNTER
Patient called and asked to speak to you about her blood work results  She also said she has a question for you

## 2022-04-27 DIAGNOSIS — K58.0 IRRITABLE BOWEL SYNDROME WITH DIARRHEA: ICD-10-CM

## 2022-04-27 RX ORDER — DIPHENOXYLATE HYDROCHLORIDE AND ATROPINE SULFATE 2.5; .025 MG/1; MG/1
TABLET ORAL
Qty: 60 TABLET | Refills: 3 | Status: SHIPPED | OUTPATIENT
Start: 2022-04-27

## 2022-05-09 DIAGNOSIS — M19.90 ARTHRITIS: Primary | ICD-10-CM

## 2022-05-09 RX ORDER — CELECOXIB 200 MG/1
200 CAPSULE ORAL DAILY
Qty: 30 CAPSULE | Refills: 1 | Status: SHIPPED | OUTPATIENT
Start: 2022-05-09 | End: 2022-07-05

## 2022-07-05 DIAGNOSIS — M19.90 ARTHRITIS: ICD-10-CM

## 2022-07-05 RX ORDER — CELECOXIB 200 MG/1
CAPSULE ORAL
Qty: 30 CAPSULE | Refills: 1 | Status: SHIPPED | OUTPATIENT
Start: 2022-07-05 | End: 2022-09-08

## 2022-07-11 ENCOUNTER — TELEPHONE (OUTPATIENT)
Dept: FAMILY MEDICINE CLINIC | Facility: CLINIC | Age: 72
End: 2022-07-11

## 2022-07-26 ENCOUNTER — OFFICE VISIT (OUTPATIENT)
Dept: FAMILY MEDICINE CLINIC | Facility: CLINIC | Age: 72
End: 2022-07-26
Payer: MEDICARE

## 2022-07-26 VITALS
DIASTOLIC BLOOD PRESSURE: 66 MMHG | HEART RATE: 72 BPM | SYSTOLIC BLOOD PRESSURE: 134 MMHG | OXYGEN SATURATION: 93 % | WEIGHT: 126 LBS | BODY MASS INDEX: 27.18 KG/M2 | RESPIRATION RATE: 19 BRPM | TEMPERATURE: 97.3 F | HEIGHT: 57 IN

## 2022-07-26 DIAGNOSIS — R73.9 ELEVATED BLOOD SUGAR: ICD-10-CM

## 2022-07-26 DIAGNOSIS — Z79.899 ENCOUNTER FOR LONG-TERM (CURRENT) USE OF MEDICATIONS: ICD-10-CM

## 2022-07-26 DIAGNOSIS — I10 ESSENTIAL HYPERTENSION: ICD-10-CM

## 2022-07-26 DIAGNOSIS — M62.838 MUSCLE SPASM OF BOTH LOWER LEGS: ICD-10-CM

## 2022-07-26 DIAGNOSIS — D47.3 ESSENTIAL (HEMORRHAGIC) THROMBOCYTHEMIA (HCC): ICD-10-CM

## 2022-07-26 DIAGNOSIS — Z87.11 HISTORY OF BLEEDING ULCERS: Primary | ICD-10-CM

## 2022-07-26 DIAGNOSIS — E03.9 HYPOTHYROIDISM, UNSPECIFIED TYPE: ICD-10-CM

## 2022-07-26 DIAGNOSIS — M81.0 OSTEOPOROSIS, UNSPECIFIED OSTEOPOROSIS TYPE, UNSPECIFIED PATHOLOGICAL FRACTURE PRESENCE: ICD-10-CM

## 2022-07-26 DIAGNOSIS — M21.371 FOOT DROP, RIGHT FOOT: ICD-10-CM

## 2022-07-26 PROCEDURE — 96372 THER/PROPH/DIAG INJ SC/IM: CPT | Performed by: FAMILY MEDICINE

## 2022-07-26 PROCEDURE — 99214 OFFICE O/P EST MOD 30 MIN: CPT | Performed by: FAMILY MEDICINE

## 2022-07-26 RX ORDER — FAMOTIDINE 20 MG/1
20 TABLET, FILM COATED ORAL 2 TIMES DAILY
Qty: 100 TABLET | Refills: 3
Start: 2022-07-26

## 2022-07-26 NOTE — PROGRESS NOTES
Assessment/Plan:  81 y o female, in NAD, says she has "a funny cough just in the a m ", no other time  Onset: mo's  Discussed GERD, reflux, and other DDx  She is well-known to me for many years presents for f/u and evaluation of the following medical issues:         1  History of bleeding ulcers  -     famotidine (PEPCID) 20 mg tablet; Take 1 tablet (20 mg total) by mouth 2 (two) times a day    2  Essential (hemorrhagic) thrombocythemia ([address] )    3  Foot drop, right foot  -     Durable Medical Equipment    4  Essential hypertension    5  Encounter for long-term (current) use of medications    6  Hypothyroidism, unspecified type    7  Muscle spasm of both lower legs    8  Elevated blood sugar  -     Comprehensive metabolic panel; Future; Expected date: 08/02/2022  -     Hemoglobin A1C; Standing  -     Hemoglobin A1C    9  Osteoporosis, unspecified osteoporosis type, unspecified pathological fracture presence  -     denosumab (PROLIA) subcutaneous injection 60 mg        Subjective:      Patient ID: Cameron Russell is a 81 y o  female  HPI    The following portions of the patient's history were reviewed and updated as appropriate: She  has a past medical history of Arthritis, Hernia, umbilical, Hypertension, and Spinal stenosis  She   Patient Active Problem List    Diagnosis Date Noted    Irritable bowel syndrome with diarrhea 03/15/2021    Increased BMI 03/15/2021    Polymyalgia rheumatica ([address] ) 07/30/2020    Arthritis     Hernia, umbilical     Spinal stenosis 04/20/2020    Hypertension 04/20/2020    Iron deficiency anemia, unspecified 01/08/2019    Essential (hemorrhagic) thrombocythemia ([address] ) 08/07/2018    Anemia 08/07/2018     She  has a past surgical history that includes Hysterectomy; Foot surgery; Hernia repair; Tonsillectomy; Mammo (historical) (2017); and Breast cyst excision (Left, 1998)    Her family history includes Breast cancer in her maternal grandmother; Cancer in her father; Colon cancer (age of onset: 62) in her father; Hypertension in her mother; No Known Problems in her maternal aunt, maternal aunt, maternal aunt, maternal aunt, paternal aunt, paternal aunt, sister, and sister  She  reports that she has never smoked  She has never used smokeless tobacco  She reports that she does not drink alcohol and does not use drugs  Current Outpatient Medications   Medication Sig Dispense Refill    baclofen 20 mg tablet TAKE 1 TABLET BY MOUTH FOUR TIMES DAILY 120 tablet 6    Calcium Carbonate-Vitamin D 600-200 MG-UNIT TABS Take by mouth        celecoxib (CeleBREX) 200 mg capsule TAKE 1 CAPSULE(200 MG) BY MOUTH DAILY 30 capsule 1    Cholecalciferol (VITAMIN D PO) Take 5,000 Units by mouth      denosumab (PROLIA) 60 mg/mL Inject under the skin      diphenoxylate-atropine (LOMOTIL) 2 5-0 025 mg per tablet TAKE 1 TABLET BY MOUTH TWICE DAILY 60 tablet 3    esomeprazole (NexIUM) 40 MG capsule Take by mouth      famotidine (PEPCID) 20 mg tablet Take 1 tablet (20 mg total) by mouth 2 (two) times a day 100 tablet 3    Ferrous Sulfate (IRON PO) Take 65 mg by mouth daily      levothyroxine 50 mcg tablet TAKE 1 TABLET BY MOUTH EVERY DAY AS DIRECTED 90 tablet 3    Multiple Vitamins-Minerals (CENTRUM SILVER 50+WOMEN) TABS Take by mouth        potassium chloride (MICRO-K) 10 MEQ CR capsule TAKE 2 CAPSULES BY MOUTH TWICE DAILY AS DIRECTED 360 capsule 3    rosuvastatin (CRESTOR) 20 MG tablet TAKE 1 TABLET BY MOUTH EVERY DAY AS DIRECTED 90 tablet 3    telmisartan (MICARDIS) 80 MG tablet Take 1 tablet (80 mg total) by mouth daily 30 tablet 6    zolpidem (AMBIEN) 5 mg tablet Take by mouth       No current facility-administered medications for this visit       Current Outpatient Medications on File Prior to Visit   Medication Sig    baclofen 20 mg tablet TAKE 1 TABLET BY MOUTH FOUR TIMES DAILY    Calcium Carbonate-Vitamin D 600-200 MG-UNIT TABS Take by mouth      celecoxib (CeleBREX) 200 mg capsule TAKE 1 CAPSULE(200 MG) BY MOUTH DAILY    Cholecalciferol (VITAMIN D PO) Take 5,000 Units by mouth    denosumab (PROLIA) 60 mg/mL Inject under the skin    diphenoxylate-atropine (LOMOTIL) 2 5-0 025 mg per tablet TAKE 1 TABLET BY MOUTH TWICE DAILY    esomeprazole (NexIUM) 40 MG capsule Take by mouth    Ferrous Sulfate (IRON PO) Take 65 mg by mouth daily    levothyroxine 50 mcg tablet TAKE 1 TABLET BY MOUTH EVERY DAY AS DIRECTED    Multiple Vitamins-Minerals (CENTRUM SILVER 50+WOMEN) TABS Take by mouth      potassium chloride (MICRO-K) 10 MEQ CR capsule TAKE 2 CAPSULES BY MOUTH TWICE DAILY AS DIRECTED    rosuvastatin (CRESTOR) 20 MG tablet TAKE 1 TABLET BY MOUTH EVERY DAY AS DIRECTED    telmisartan (MICARDIS) 80 MG tablet Take 1 tablet (80 mg total) by mouth daily    zolpidem (AMBIEN) 5 mg tablet Take by mouth     No current facility-administered medications on file prior to visit  She is allergic to aspirin and ibuprofen       Review of Systems   Constitutional: Negative for activity change (in WC), appetite change, chills, diaphoresis, fatigue and fever  HENT: Negative for congestion, ear discharge, ear pain, facial swelling, nosebleeds, sore throat, tinnitus, trouble swallowing and voice change  Eyes: Negative for photophobia, pain, discharge, redness, itching and visual disturbance  Respiratory: Negative for apnea, cough, choking, chest tightness and shortness of breath  Cardiovascular: Negative for chest pain, palpitations and leg swelling  Denies any and all cardiac symptoms   Gastrointestinal: Negative for abdominal distention, abdominal pain, blood in stool, constipation, diarrhea, nausea and vomiting  Endocrine: Negative for cold intolerance, heat intolerance, polydipsia, polyphagia and polyuria  Genitourinary: Negative for decreased urine volume, difficulty urinating, dysuria, enuresis, frequency, hematuria, pelvic pain, urgency and vaginal bleeding     Musculoskeletal: Positive for arthralgias, back pain, gait problem and myalgias  Negative for joint swelling, neck pain and neck stiffness  Severe scoliosis with kyphosis throughout spine especially lumbar spine  History of PMR on prednisone  Sees Dr Brittney Blanco q6 mo and he is rx'ing the arthritis   Skin: Positive for pallor  Negative for color change and rash  Allergic/Immunologic: Negative for immunocompromised state  Neurological: Negative for dizziness, seizures, facial asymmetry, light-headedness, numbness and headaches  Hematological: Negative for adenopathy  Psychiatric/Behavioral: Negative for agitation, behavioral problems, confusion, decreased concentration, dysphoric mood and hallucinations  Objective:      /66 (BP Location: Right arm, Patient Position: Sitting, Cuff Size: Standard)   Pulse 72   Temp (!) 97 3 °F (36 3 °C) (Temporal)   Resp 19   Ht 4' 9" (1 448 m)   Wt 57 2 kg (126 lb)   SpO2 93%   BMI 27 27 kg/m²          Physical Exam  Vitals and nursing note reviewed  Constitutional:       General: She is not in acute distress  Appearance: Normal appearance  She is well-developed  She is obese  She is not ill-appearing, toxic-appearing or diaphoretic  HENT:      Head: Normocephalic and atraumatic  Nose: Nose normal    Eyes:      General: No scleral icterus  Right eye: No discharge  Left eye: No discharge  Conjunctiva/sclera: Conjunctivae normal       Pupils: Pupils are equal, round, and reactive to light  Neck:      Thyroid: No thyromegaly  Trachea: No tracheal deviation  Cardiovascular:      Rate and Rhythm: Normal rate and regular rhythm  Heart sounds: Normal heart sounds  Pulmonary:      Effort: No respiratory distress  Breath sounds: Normal breath sounds  No wheezing or rales  Chest:      Chest wall: No tenderness  Abdominal:      Palpations: Abdomen is soft  Musculoskeletal:         General: No tenderness or deformity   Normal range of motion  Cervical back: Normal range of motion and neck supple  Right lower leg: No edema  Left lower leg: No edema  Comments: Patient with many many years severe kyphoscoliosis lumbar spine  Cannot ambulate without walker and review of rheumatologist notes suggest impending surgery needed  Skin:     General: Skin is warm and dry  Coloration: Skin is not pale  Findings: No erythema or rash  Neurological:      General: No focal deficit present  Mental Status: She is alert and oriented to person, place, and time  Cranial Nerves: No cranial nerve deficit  Gait: Gait abnormal       Deep Tendon Reflexes: Reflexes abnormal    Psychiatric:         Mood and Affect: Mood normal          Behavior: Behavior normal          Thought Content: Thought content normal          Judgment: Judgment normal          30 min with pt and   This time was spent reviewing previous records, reviewing previous laboratory and other tests, taking history from patient, examination of patient, discussion of prognosis and treatment, ordering laboratory tests, ordering medications, and completion of the medical record

## 2022-08-03 DIAGNOSIS — M62.838 MUSCLE SPASM OF BOTH LOWER LEGS: ICD-10-CM

## 2022-08-04 ENCOUNTER — OFFICE VISIT (OUTPATIENT)
Dept: ENDOCRINOLOGY | Facility: CLINIC | Age: 72
End: 2022-08-04
Payer: MEDICARE

## 2022-08-04 VITALS
SYSTOLIC BLOOD PRESSURE: 142 MMHG | WEIGHT: 127 LBS | HEIGHT: 57 IN | HEART RATE: 66 BPM | DIASTOLIC BLOOD PRESSURE: 80 MMHG | BODY MASS INDEX: 27.4 KG/M2

## 2022-08-04 DIAGNOSIS — E21.3 HYPERPARATHYROIDISM (HCC): Primary | ICD-10-CM

## 2022-08-04 DIAGNOSIS — E83.52 HYPERCALCEMIA: ICD-10-CM

## 2022-08-04 DIAGNOSIS — M81.6 LOCALIZED OSTEOPOROSIS WITHOUT CURRENT PATHOLOGICAL FRACTURE: ICD-10-CM

## 2022-08-04 DIAGNOSIS — R53.83 FATIGUE, UNSPECIFIED TYPE: ICD-10-CM

## 2022-08-04 PROCEDURE — 99214 OFFICE O/P EST MOD 30 MIN: CPT | Performed by: INTERNAL MEDICINE

## 2022-08-04 RX ORDER — BACLOFEN 20 MG/1
TABLET ORAL
Qty: 120 TABLET | Refills: 6 | Status: SHIPPED | OUTPATIENT
Start: 2022-08-04

## 2022-08-04 NOTE — PROGRESS NOTES
Follow-up Patient Progress Note      CC: hypercalcemia     History of Present Illness:   73 yr female with hypothyroidism, HTN, severe HLD, DJD spine, osteoporosis on prolia for 7 years last dose 12/2020, PMR, Anemia, Essential thrombocytosis, obesity and hyperparathyroidism  She had HCTZ related hypercalcemia  Last visit was 8/5/21  Since last visit, weight has been stable  mobility has improved and patient is doing well  Given her Hx of immobility and prolia use, this is most consistent with bone metabolism after stopping prolia  Was on prolia for 7 years  Last dose was 7/27/22 via PCP office  DXA showed severe osteoporosis with hip T scores -2 4 LS, -3 2 LTH, -3 2 LFN and -0 7 lt forearm  10yr FRAX score 7 4% for hip fracture and 20 5% major osteoporotic fracture      Patient Active Problem List   Diagnosis    Essential (hemorrhagic) thrombocythemia ([facility] [address] )    Anemia    Iron deficiency anemia, unspecified    Spinal stenosis    Hypertension    Polymyalgia rheumatica (HCC)    Arthritis    Hernia, umbilical    Irritable bowel syndrome with diarrhea    Increased BMI     Past Medical History:   Diagnosis Date    Arthritis     Hernia, umbilical     Hypertension     Spinal stenosis       Past Surgical History:   Procedure Laterality Date    BREAST CYST EXCISION Left 1998    FOOT SURGERY      Toe    HERNIA REPAIR      HYSTERECTOMY      MAMMO (HISTORICAL)  2017    TONSILLECTOMY        Family History   Problem Relation Age of Onset    Hypertension Mother     Cancer Father     Colon cancer Father 62    No Known Problems Sister     Breast cancer Maternal Grandmother         not sure of her age of onset   Siena Riis No Known Problems Sister     No Known Problems Maternal Aunt     No Known Problems Maternal Aunt     No Known Problems Maternal Aunt     No Known Problems Maternal Aunt     No Known Problems Paternal Aunt     No Known Problems Paternal Aunt      Social History     Tobacco Use    Smoking status: Never Smoker    Smokeless tobacco: Never Used   Substance Use Topics    Alcohol use: No     Allergies   Allergen Reactions    Aspirin     Ibuprofen          Current Outpatient Medications:     baclofen 20 mg tablet, TAKE 1 TABLET BY MOUTH FOUR TIMES DAILY, Disp: 120 tablet, Rfl: 6    Calcium Carbonate-Vitamin D 600-200 MG-UNIT TABS, Take by mouth  , Disp: , Rfl:     celecoxib (CeleBREX) 200 mg capsule, TAKE 1 CAPSULE(200 MG) BY MOUTH DAILY, Disp: 30 capsule, Rfl: 1    Cholecalciferol (VITAMIN D PO), Take 5,000 Units by mouth, Disp: , Rfl:     denosumab (PROLIA) 60 mg/mL, Inject under the skin, Disp: , Rfl:     esomeprazole (NexIUM) 40 MG capsule, Take by mouth, Disp: , Rfl:     Ferrous Sulfate (IRON PO), Take 65 mg by mouth daily, Disp: , Rfl:     levothyroxine 50 mcg tablet, TAKE 1 TABLET BY MOUTH EVERY DAY AS DIRECTED, Disp: 90 tablet, Rfl: 3    Multiple Vitamins-Minerals (CENTRUM SILVER 50+WOMEN) TABS, Take by mouth  , Disp: , Rfl:     potassium chloride (MICRO-K) 10 MEQ CR capsule, TAKE 2 CAPSULES BY MOUTH TWICE DAILY AS DIRECTED, Disp: 360 capsule, Rfl: 3    rosuvastatin (CRESTOR) 20 MG tablet, TAKE 1 TABLET BY MOUTH EVERY DAY AS DIRECTED, Disp: 90 tablet, Rfl: 3    telmisartan (MICARDIS) 80 MG tablet, Take 1 tablet (80 mg total) by mouth daily, Disp: 30 tablet, Rfl: 6    zolpidem (AMBIEN) 5 mg tablet, Take by mouth, Disp: , Rfl:     diphenoxylate-atropine (LOMOTIL) 2 5-0 025 mg per tablet, TAKE 1 TABLET BY MOUTH TWICE DAILY (Patient not taking: Reported on 8/4/2022), Disp: 60 tablet, Rfl: 3    famotidine (PEPCID) 20 mg tablet, Take 1 tablet (20 mg total) by mouth 2 (two) times a day (Patient not taking: Reported on 8/4/2022), Disp: 100 tablet, Rfl: 3    Review of Systems   Constitutional: Positive for fatigue  HENT: Negative  Eyes: Negative  Respiratory: Negative  Cardiovascular: Negative  Gastrointestinal: Negative  Endocrine: Negative      Musculoskeletal: Negative  Skin: Negative  Allergic/Immunologic: Negative  Neurological: Negative  Hematological: Negative  Psychiatric/Behavioral: Negative  Physical Exam:  Body mass index is 27 48 kg/m²  /80   Pulse 66   Ht 4' 9" (1 448 m)   Wt 57 6 kg (127 lb)   BMI 27 48 kg/m²    Vitals:    08/04/22 1107   Weight: 57 6 kg (127 lb)        Physical Exam  Constitutional:       Appearance: She is well-developed  HENT:      Head: Normocephalic  Eyes:      Pupils: Pupils are equal, round, and reactive to light  Neck:      Thyroid: No thyromegaly  Cardiovascular:      Rate and Rhythm: Normal rate  Heart sounds: Normal heart sounds  Pulmonary:      Effort: Pulmonary effort is normal       Breath sounds: Normal breath sounds  Abdominal:      General: Bowel sounds are normal       Palpations: Abdomen is soft  Musculoskeletal:         General: No deformity  Cervical back: Normal range of motion  Skin:     Capillary Refill: Capillary refill takes less than 2 seconds  Coloration: Skin is not pale  Findings: No rash  Neurological:      Mental Status: She is alert and oriented to person, place, and time           Labs:   Lab Results   Component Value Date    HGBA1C 5 5 04/13/2022       Lab Results   Component Value Date    [id number] 3 000 04/13/2022       Lab Results   Component Value Date    CREATININE 0 81 04/13/2022    CREATININE 0 91 11/22/2021    CREATININE 0 77 09/29/2021    BUN 18 04/13/2022    K 4 1 04/13/2022     04/13/2022    CO2 24 04/13/2022     eGFR   Date Value Ref Range Status   04/13/2022 73 ml/min/1 73sq m Final       Lab Results   Component Value Date    ALT 21 04/13/2022    AST 19 04/13/2022    ALKPHOS 92 04/13/2022       Lab Results   Component Value Date    CHOLESTEROL 161 04/13/2022    CHOLESTEROL 173 11/22/2021    CHOLESTEROL 176 03/19/2021     Lab Results   Component Value Date    HDL 63 04/13/2022    HDL 64 11/22/2021    HDL 73 03/19/2021 Lab Results   Component Value Date    TRIG 65 04/13/2022    TRIG 86 11/22/2021    TRIG 66 03/19/2021     Lab Results   Component Value Date    [place] 98 04/13/2022    [place] 109 11/22/2021    [place] 103 03/19/2021         Impression:  1  Hyperparathyroidism ([address] )    2  Hypercalcemia    3  osteoporosis without current pathological fracture    4  Fatigue, unspecified type         Plan:    Diagnoses and all orders for this visit:    Hyperparathyroidism ([facility] )  Suspect secondary due to low calcium intake or absorption  Primary hyperparathyroidism with normocalcemia is possible too  Low 24hr urine calcium and a normal lt forearm BMD on otherwise severe osteoporotic bone argues against primary hyperparathyroidism  Since last 6 months, she has been on calcium 1200mg/day supplements and vitamin D3 replacement  Serum calcium was 9 6mg/dL corrected  Vit D 25OH was 32ng/mL  Will repeat labs as listed and follow up in 1 year  -     Vitamin D 25 hydroxy; Future  -     PTH, intact; Future  -     Protein electrophoresis, urine  -     Protein electrophoresis, serum; Future  -     Phosphorus; Future  -     C-Telopeptide; Future  -     Comprehensive metabolic panel; Future    Hypercalcemia  2" HCTZ -seems resolved  Osteoporosis without current pathological fracture  Last DXA 5/21  Repeat 2 years time  She is on prolia  If future DXA shows worsening to <3 5 T scores, may consider adding evenity for anabolic effect     -     DXA bone density spine hip and pelvis; Future    Fatigue, unspecified type  -     T4, free; Future  -     TSH, 3rd generation; Future        I have spent 35 minutes with patient today in which greater than 50% of this time was spent in counseling/coordination of care  Discussed with the patient and all questioned fully answered  She will call me if any problems arise      Educated/ Counseled patient on diagnostic test results, prognosis, risk vs benefit of treatment options, importance of treatment compliance, healthy life and lifestyle choices        Sandro Newby

## 2022-08-04 NOTE — LETTER
August 4, 2022     Renzo Coleman MD  [address]  [address] [place] [address]    Patient: Loida Bills   YOB: 1950   Date of Visit: 8/4/2022       Dear Dr Devyn Campos:    Thank you for referring Ardahlia Johnson to me for evaluation  Below are my notes for this consultation  If you have questions, please do not hesitate to call me  I look forward to following your patient along with you  Sincerely,        Savannah Shay MD        CC: No Recipients  Savannah Shay MD  8/4/2022 12:53 PM  Signed    Follow-up Patient Progress Note      CC: hypercalcemia     History of Present Illness:   73 yr female with hypothyroidism, HTN, severe HLD, DJD spine, osteoporosis on prolia for 7 years last dose 12/2020, PMR, Anemia, Essential thrombocytosis, obesity and hyperparathyroidism  She had HCTZ related hypercalcemia  Last visit was 8/5/21  Since last visit, weight has been stable  mobility has improved and patient is doing well  Given her Hx of immobility and prolia use, this is most consistent with bone metabolism after stopping prolia  Was on prolia for 7 years  Last dose was 7/27/22 via PCP office  DXA showed severe osteoporosis with hip T scores -2 4 LS, -3 2 LTH, -3 2 LFN and -0 7 lt forearm  10yr FRAX score 7 4% for hip fracture and 20 5% major osteoporotic fracture      Patient Active Problem List   Diagnosis    Essential (hemorrhagic) thrombocythemia ([address] )    Anemia    Iron deficiency anemia, unspecified    Spinal stenosis    Hypertension    Polymyalgia rheumatica ([address] )    Arthritis    Hernia, umbilical    Irritable bowel syndrome with diarrhea    Increased BMI     Past Medical History:   Diagnosis Date    Arthritis     Hernia, umbilical     Hypertension     Spinal stenosis       Past Surgical History:   Procedure Laterality Date    BREAST CYST EXCISION Left 1998    FOOT SURGERY      Toe    HERNIA REPAIR      HYSTERECTOMY      MAMMO (HISTORICAL) 2017    TONSILLECTOMY        Family History   Problem Relation Age of Onset    Hypertension Mother     Cancer Father     Colon cancer Father 62    No Known Problems Sister     Breast cancer Maternal Grandmother         not sure of her age of onset   Mavis Cousin No Known Problems Sister     No Known Problems Maternal Aunt     No Known Problems Maternal Aunt     No Known Problems Maternal Aunt     No Known Problems Maternal Aunt     No Known Problems Paternal Aunt     No Known Problems Paternal Aunt      Social History     Tobacco Use    Smoking status: Never Smoker    Smokeless tobacco: Never Used   Substance Use Topics    Alcohol use: No     Allergies   Allergen Reactions    Aspirin     Ibuprofen          Current Outpatient Medications:     baclofen 20 mg tablet, TAKE 1 TABLET BY MOUTH FOUR TIMES DAILY, Disp: 120 tablet, Rfl: 6    Calcium Carbonate-Vitamin D 600-200 MG-UNIT TABS, Take by mouth  , Disp: , Rfl:     celecoxib (CeleBREX) 200 mg capsule, TAKE 1 CAPSULE(200 MG) BY MOUTH DAILY, Disp: 30 capsule, Rfl: 1    Cholecalciferol (VITAMIN D PO), Take 5,000 Units by mouth, Disp: , Rfl:     denosumab (PROLIA) 60 mg/mL, Inject under the skin, Disp: , Rfl:     esomeprazole (NexIUM) 40 MG capsule, Take by mouth, Disp: , Rfl:     Ferrous Sulfate (IRON PO), Take 65 mg by mouth daily, Disp: , Rfl:     levothyroxine 50 mcg tablet, TAKE 1 TABLET BY MOUTH EVERY DAY AS DIRECTED, Disp: 90 tablet, Rfl: 3    Multiple Vitamins-Minerals (CENTRUM SILVER 50+WOMEN) TABS, Take by mouth  , Disp: , Rfl:     potassium chloride (MICRO-K) 10 MEQ CR capsule, TAKE 2 CAPSULES BY MOUTH TWICE DAILY AS DIRECTED, Disp: 360 capsule, Rfl: 3    rosuvastatin (CRESTOR) 20 MG tablet, TAKE 1 TABLET BY MOUTH EVERY DAY AS DIRECTED, Disp: 90 tablet, Rfl: 3    telmisartan (MICARDIS) 80 MG tablet, Take 1 tablet (80 mg total) by mouth daily, Disp: 30 tablet, Rfl: 6    zolpidem (AMBIEN) 5 mg tablet, Take by mouth, Disp: , Rfl:    diphenoxylate-atropine (LOMOTIL) 2 5-0 025 mg per tablet, TAKE 1 TABLET BY MOUTH TWICE DAILY (Patient not taking: Reported on 8/4/2022), Disp: 60 tablet, Rfl: 3    famotidine (PEPCID) 20 mg tablet, Take 1 tablet (20 mg total) by mouth 2 (two) times a day (Patient not taking: Reported on 8/4/2022), Disp: 100 tablet, Rfl: 3    Review of Systems   Constitutional: Positive for fatigue  HENT: Negative  Eyes: Negative  Respiratory: Negative  Cardiovascular: Negative  Gastrointestinal: Negative  Endocrine: Negative  Musculoskeletal: Negative  Skin: Negative  Allergic/Immunologic: Negative  Neurological: Negative  Hematological: Negative  Psychiatric/Behavioral: Negative  Physical Exam:  Body mass index is 27 48 kg/m²  /80   Pulse 66   Ht 4' 9" (1 448 m)   Wt 57 6 kg (127 lb)   BMI 27 48 kg/m²    Vitals:    08/04/22 1107   Weight: 57 6 kg (127 lb)        Physical Exam  Constitutional:       Appearance: She is well-developed  HENT:      Head: Normocephalic  Eyes:      Pupils: Pupils are equal, round, and reactive to light  Neck:      Thyroid: No thyromegaly  Cardiovascular:      Rate and Rhythm: Normal rate  Heart sounds: Normal heart sounds  Pulmonary:      Effort: Pulmonary effort is normal       Breath sounds: Normal breath sounds  Abdominal:      General: Bowel sounds are normal       Palpations: Abdomen is soft  Musculoskeletal:         General: No deformity  Cervical back: Normal range of motion  Skin:     Capillary Refill: Capillary refill takes less than 2 seconds  Coloration: Skin is not pale  Findings: No rash  Neurological:      Mental Status: She is alert and oriented to person, place, and time           Labs:   Lab Results   Component Value Date    HGBA1C 5 5 04/13/2022       Lab Results   Component Value Date    [id number] 3 000 04/13/2022       Lab Results   Component Value Date    CREATININE 0 81 04/13/2022 CREATININE 0 91 11/22/2021    CREATININE 0 77 09/29/2021    BUN 18 04/13/2022    K 4 1 04/13/2022     04/13/2022    CO2 24 04/13/2022     eGFR   Date Value Ref Range Status   04/13/2022 73 ml/min/1 73sq m Final       Lab Results   Component Value Date    ALT 21 04/13/2022    AST 19 04/13/2022    ALKPHOS 92 04/13/2022       Lab Results   Component Value Date    CHOLESTEROL 161 04/13/2022    CHOLESTEROL 173 11/22/2021    CHOLESTEROL 176 03/19/2021     Lab Results   Component Value Date    HDL 63 04/13/2022    HDL 64 11/22/2021    HDL 73 03/19/2021     Lab Results   Component Value Date    TRIG 65 04/13/2022    TRIG 86 11/22/2021    TRIG 66 03/19/2021     Lab Results   Component Value Date    [place] 98 04/13/2022    [place] 109 11/22/2021    [place] 103 03/19/2021         Impression:  1  Hyperparathyroidism ([facility] [address] )    2  Hypercalcemia    3  osteoporosis without current pathological fracture    4  Fatigue, unspecified type         Plan:    Diagnoses and all orders for this visit:    Hyper[facility]roidism ([facility] )  Suspect secondary due to low calcium intake or absorption  Primary hyperparathyroidism with normocalcemia is possible too  Low 24hr urine calcium and a normal lt forearm BMD on otherwise severe osteoporotic bone argues against primary hyperparathyroidism  Since last 6 months, she has been on calcium 1200mg/day supplements and vitamin D3 replacement  Serum calcium was 9 6mg/dL corrected  Vit D 25OH was 32ng/mL  Will repeat labs as listed and follow up in 1 year  -     Vitamin D 25 hydroxy; Future  -     PTH, intact; Future  -     Protein electrophoresis, urine  -     Protein electrophoresis, serum; Future  -     Phosphorus; Future  -     C-Telopeptide; Future  -     Comprehensive metabolic panel; Future    Hypercalcemia  2" HCTZ -seems resolved  Osteoporosis without current pathological fracture  Last DXA 5/21  Repeat 2 years time  She is on prolia   If future DXA shows worsening to <3 5 T scores, may consider adding evenity for anabolic effect     -     DXA bone density spine hip and pelvis; Future    Fatigue, unspecified type  -     T4, free; Future  -     TSH, 3rd generation; Future        I have spent 35 minutes with patient today in which greater than 50% of this time was spent in counseling/coordination of care  Discussed with the patient and all questioned fully answered  She will call me if any problems arise  Educated/ Counseled patient on diagnostic test results, prognosis, risk vs benefit of treatment options, importance of treatment compliance, healthy life and lifestyle choices        [address]

## 2022-08-11 ENCOUNTER — TELEPHONE (OUTPATIENT)
Dept: FAMILY MEDICINE CLINIC | Facility: CLINIC | Age: 72
End: 2022-08-11

## 2022-08-11 DIAGNOSIS — R52 PAIN: Primary | ICD-10-CM

## 2022-08-15 ENCOUNTER — HOSPITAL ENCOUNTER (OUTPATIENT)
Dept: RADIOLOGY | Facility: HOSPITAL | Age: 72
Discharge: HOME | End: 2022-08-15
Attending: FAMILY MEDICINE
Payer: MEDICARE

## 2022-08-15 DIAGNOSIS — R52 PAIN: ICD-10-CM

## 2022-08-15 PROCEDURE — 73562 X-RAY EXAM OF KNEE 3: CPT

## 2022-08-17 ENCOUNTER — TELEPHONE (OUTPATIENT)
Dept: FAMILY MEDICINE CLINIC | Facility: CLINIC | Age: 72
End: 2022-08-17

## 2022-08-22 ENCOUNTER — TELEPHONE (OUTPATIENT)
Dept: FAMILY MEDICINE CLINIC | Facility: CLINIC | Age: 72
End: 2022-08-22

## 2022-08-22 DIAGNOSIS — M25.561 PAIN IN BOTH KNEES, UNSPECIFIED CHRONICITY: Primary | ICD-10-CM

## 2022-08-22 DIAGNOSIS — M25.562 PAIN IN BOTH KNEES, UNSPECIFIED CHRONICITY: Primary | ICD-10-CM

## 2022-08-22 NOTE — TELEPHONE ENCOUNTER
Pt called and left msg on machine stating that she had xray done on both knee and would like to know the results and what she should do now ???  Please call pt to discuss

## 2022-08-24 NOTE — TELEPHONE ENCOUNTER
I spoke to pt and she stated that she is ok with going to see the orthopedic surgeon you recommend , please put in an order so the office will cori her

## 2022-08-29 ENCOUNTER — TELEPHONE (OUTPATIENT)
Dept: FAMILY MEDICINE CLINIC | Facility: CLINIC | Age: 72
End: 2022-08-29

## 2022-08-29 NOTE — TELEPHONE ENCOUNTER
Patient called she wanted to know if she can see the orthopedic doctor she saw before she cant get an appointment with the doctor you recommended till November

## 2022-09-08 DIAGNOSIS — M19.90 ARTHRITIS: ICD-10-CM

## 2022-09-08 RX ORDER — CELECOXIB 200 MG/1
CAPSULE ORAL
Qty: 30 CAPSULE | Refills: 1 | Status: SHIPPED | OUTPATIENT
Start: 2022-09-08

## 2022-09-13 ENCOUNTER — TELEPHONE (OUTPATIENT)
Dept: FAMILY MEDICINE CLINIC | Facility: CLINIC | Age: 72
End: 2022-09-13

## 2022-09-20 NOTE — TELEPHONE ENCOUNTER
Patient called you referred to an orthopedic doctor    She already has one she sees is it ok for her to go to the doctor she knows?
Spoke with patient - She states someone already called her with this information 
Yes   I think it is Dr Jamila Vieira     I didn't know he also replaced hips (til AFTER I said she should see another ortho  I thought he only did shoulders)  If he does hips, by all means see him  (Tell pt what I said here, so she knows   Thx)
Vaccine status unknown

## 2022-10-17 ENCOUNTER — TELEPHONE (OUTPATIENT)
Dept: HEMATOLOGY ONCOLOGY | Facility: CLINIC | Age: 72
End: 2022-10-17

## 2022-10-17 NOTE — TELEPHONE ENCOUNTER
Left message on home phone with new date & time for 1 year follow up  Also notified that she will be seeing PA-C   Gave HOPE# to call for any questions/rescheduling

## 2022-10-20 ENCOUNTER — APPOINTMENT (OUTPATIENT)
Dept: LAB | Facility: CLINIC | Age: 72
End: 2022-10-20
Payer: MEDICARE

## 2022-10-20 DIAGNOSIS — R73.9 ELEVATED BLOOD SUGAR: ICD-10-CM

## 2022-10-20 DIAGNOSIS — E21.3 HYPERPARATHYROIDISM (HCC): ICD-10-CM

## 2022-10-20 DIAGNOSIS — R53.83 FATIGUE, UNSPECIFIED TYPE: ICD-10-CM

## 2022-10-20 LAB
25(OH)D3 SERPL-MCNC: 31.5 NG/ML (ref 30–100)
ALBUMIN SERPL BCP-MCNC: 4.1 G/DL (ref 3.5–5)
ALP SERPL-CCNC: 77 U/L (ref 34–104)
ALT SERPL W P-5'-P-CCNC: 11 U/L (ref 7–52)
ANION GAP SERPL CALCULATED.3IONS-SCNC: 8 MMOL/L (ref 4–13)
AST SERPL W P-5'-P-CCNC: 17 U/L (ref 13–39)
BILIRUB SERPL-MCNC: 0.57 MG/DL (ref 0.2–1)
BUN SERPL-MCNC: 21 MG/DL (ref 5–25)
CALCIUM SERPL-MCNC: 9.1 MG/DL (ref 8.4–10.2)
CHLORIDE SERPL-SCNC: 110 MMOL/L (ref 96–108)
CO2 SERPL-SCNC: 22 MMOL/L (ref 21–32)
CREAT SERPL-MCNC: 0.71 MG/DL (ref 0.6–1.3)
EST. AVERAGE GLUCOSE BLD GHB EST-MCNC: 117 MG/DL
GFR SERPL CREATININE-BSD FRML MDRD: 86 ML/MIN/1.73SQ M
GLUCOSE P FAST SERPL-MCNC: 93 MG/DL (ref 65–99)
HBA1C MFR BLD: 5.7 %
PHOSPHATE SERPL-MCNC: 3.4 MG/DL (ref 2.3–4.1)
POTASSIUM SERPL-SCNC: 4.3 MMOL/L (ref 3.5–5.3)
PROT SERPL-MCNC: 7.3 G/DL (ref 6.4–8.4)
PTH-INTACT SERPL-MCNC: 101.2 PG/ML (ref 18.4–80.1)
SODIUM SERPL-SCNC: 140 MMOL/L (ref 135–147)
T4 FREE SERPL-MCNC: 1.09 NG/DL (ref 0.76–1.46)
TSH SERPL DL<=0.05 MIU/L-ACNC: 4.05 UIU/ML (ref 0.45–4.5)

## 2022-10-20 PROCEDURE — 84166 PROTEIN E-PHORESIS/URINE/CSF: CPT | Performed by: INTERNAL MEDICINE

## 2022-10-20 PROCEDURE — 84100 ASSAY OF PHOSPHORUS: CPT

## 2022-10-20 PROCEDURE — 84439 ASSAY OF FREE THYROXINE: CPT

## 2022-10-20 PROCEDURE — 83970 ASSAY OF PARATHORMONE: CPT

## 2022-10-20 PROCEDURE — 84165 PROTEIN E-PHORESIS SERUM: CPT

## 2022-10-20 PROCEDURE — 86334 IMMUNOFIX E-PHORESIS SERUM: CPT

## 2022-10-20 PROCEDURE — 84443 ASSAY THYROID STIM HORMONE: CPT

## 2022-10-20 PROCEDURE — 80053 COMPREHEN METABOLIC PANEL: CPT

## 2022-10-20 PROCEDURE — 82306 VITAMIN D 25 HYDROXY: CPT

## 2022-10-20 PROCEDURE — 82523 COLLAGEN CROSSLINKS: CPT

## 2022-10-20 PROCEDURE — 36415 COLL VENOUS BLD VENIPUNCTURE: CPT | Performed by: FAMILY MEDICINE

## 2022-10-20 PROCEDURE — 83036 HEMOGLOBIN GLYCOSYLATED A1C: CPT | Performed by: FAMILY MEDICINE

## 2022-10-21 ENCOUNTER — TELEPHONE (OUTPATIENT)
Dept: HEMATOLOGY ONCOLOGY | Facility: CLINIC | Age: 72
End: 2022-10-21

## 2022-10-21 DIAGNOSIS — D75.839 THROMBOCYTOSIS: Primary | ICD-10-CM

## 2022-10-21 LAB
ALBUMIN SERPL ELPH-MCNC: 3.87 G/DL (ref 3.5–5)
ALBUMIN SERPL ELPH-MCNC: 50.9 % (ref 52–65)
ALBUMIN UR ELPH-MCNC: 100 %
ALPHA1 GLOB MFR UR ELPH: 0 %
ALPHA1 GLOB SERPL ELPH-MCNC: 0.4 G/DL (ref 0.1–0.4)
ALPHA1 GLOB SERPL ELPH-MCNC: 5.3 % (ref 2.5–5)
ALPHA2 GLOB MFR UR ELPH: 0 %
ALPHA2 GLOB SERPL ELPH-MCNC: 1.1 G/DL (ref 0.4–1.2)
ALPHA2 GLOB SERPL ELPH-MCNC: 14.5 % (ref 7–13)
B-GLOBULIN MFR UR ELPH: 0 %
BETA GLOB ABNORMAL SERPL ELPH-MCNC: 0.45 G/DL (ref 0.4–0.8)
BETA1 GLOB SERPL ELPH-MCNC: 5.9 % (ref 5–13)
BETA2 GLOB SERPL ELPH-MCNC: 7.3 % (ref 2–8)
BETA2+GAMMA GLOB SERPL ELPH-MCNC: 0.55 G/DL (ref 0.2–0.5)
GAMMA GLOB ABNORMAL SERPL ELPH-MCNC: 1.22 G/DL (ref 0.5–1.6)
GAMMA GLOB MFR UR ELPH: 0 %
GAMMA GLOB SERPL ELPH-MCNC: 16.1 % (ref 12–22)
IGG/ALB SER: 1.04 {RATIO} (ref 1.1–1.8)
INTERPRETATION UR IFE-IMP: NORMAL
M PROTEIN 1 MFR SERPL ELPH: 6.1 %
M PROTEIN 1 SERPL ELPH-MCNC: 0.46 G/DL
PROT PATTERN SERPL ELPH-IMP: ABNORMAL
PROT PATTERN UR ELPH-IMP: NORMAL
PROT SERPL-MCNC: 7.6 G/DL (ref 6.4–8.2)
PROT UR-MCNC: 6 MG/DL

## 2022-10-21 PROCEDURE — 86334 IMMUNOFIX E-PHORESIS SERUM: CPT | Performed by: PATHOLOGY

## 2022-10-21 PROCEDURE — 84166 PROTEIN E-PHORESIS/URINE/CSF: CPT | Performed by: PATHOLOGY

## 2022-10-21 PROCEDURE — 84165 PROTEIN E-PHORESIS SERUM: CPT | Performed by: PATHOLOGY

## 2022-10-22 DIAGNOSIS — D47.2 MONOCLONAL GAMMOPATHY OF UNDETERMINED SIGNIFICANCE: Primary | ICD-10-CM

## 2022-10-23 LAB — COLLAGEN CTX SERPL-MCNC: 104 PG/ML

## 2022-11-01 DIAGNOSIS — E03.9 HYPOTHYROIDISM, UNSPECIFIED TYPE: ICD-10-CM

## 2022-11-01 DIAGNOSIS — E87.6 HYPOPOTASSEMIA: ICD-10-CM

## 2022-11-01 DIAGNOSIS — E78.2 MIXED HYPERLIPIDEMIA: ICD-10-CM

## 2022-11-01 RX ORDER — ROSUVASTATIN CALCIUM 20 MG/1
TABLET, COATED ORAL
Qty: 90 TABLET | Refills: 3 | Status: SHIPPED | OUTPATIENT
Start: 2022-11-01

## 2022-11-01 RX ORDER — LEVOTHYROXINE SODIUM 0.05 MG/1
TABLET ORAL
Qty: 90 TABLET | Refills: 3 | Status: SHIPPED | OUTPATIENT
Start: 2022-11-01

## 2022-11-01 RX ORDER — POTASSIUM CHLORIDE 750 MG/1
CAPSULE, EXTENDED RELEASE ORAL
Qty: 360 CAPSULE | Refills: 3 | Status: SHIPPED | OUTPATIENT
Start: 2022-11-01

## 2022-11-03 ENCOUNTER — OFFICE VISIT (OUTPATIENT)
Dept: FAMILY MEDICINE CLINIC | Facility: CLINIC | Age: 72
End: 2022-11-03

## 2022-11-03 VITALS
TEMPERATURE: 98.5 F | RESPIRATION RATE: 16 BRPM | HEART RATE: 50 BPM | SYSTOLIC BLOOD PRESSURE: 126 MMHG | BODY MASS INDEX: 27.4 KG/M2 | HEIGHT: 57 IN | DIASTOLIC BLOOD PRESSURE: 82 MMHG | WEIGHT: 127 LBS | OXYGEN SATURATION: 99 %

## 2022-11-03 DIAGNOSIS — E03.9 HYPOTHYROIDISM, UNSPECIFIED TYPE: ICD-10-CM

## 2022-11-03 DIAGNOSIS — D47.2 MONOCLONAL GAMMOPATHY OF UNDETERMINED SIGNIFICANCE: Primary | ICD-10-CM

## 2022-11-03 DIAGNOSIS — Z23 FLU VACCINE NEED: ICD-10-CM

## 2022-11-03 DIAGNOSIS — M25.562 PAIN IN BOTH KNEES, UNSPECIFIED CHRONICITY: ICD-10-CM

## 2022-11-03 DIAGNOSIS — M25.561 PAIN IN BOTH KNEES, UNSPECIFIED CHRONICITY: ICD-10-CM

## 2022-11-03 DIAGNOSIS — Z79.899 ENCOUNTER FOR LONG-TERM (CURRENT) USE OF MEDICATIONS: ICD-10-CM

## 2022-11-03 DIAGNOSIS — E78.2 MIXED HYPERLIPIDEMIA: ICD-10-CM

## 2022-11-03 NOTE — PROGRESS NOTES
Assessment/Plan : 82 Yo female, in NAD, seen with , flu vac given and reviewed labs  Noteable, is recent SPEP which revealed  A monoclonal peak in the gamma region  Did see Dr Sonny Garnica and f/u with beta-2 globulin   Has new Dx of MGUS and will await Beta 2 and light chains  1  Monoclonal gammopathy of undetermined significance  Comments:  New Dx:  pending Beta-2 and light chains    2  Pain in both knees, unspecified chronicity  Comments: Will see Dr Greg Hernandez in Jan    3  Hypothyroidism, unspecified type    4  Mixed hyperlipidemia    5  Encounter for long-term (current) use of medications    6  Flu vaccine need        Subjective:      Patient ID: Daysi Serna is a 81 y o  female  HPI    The following portions of the patient's history were reviewed and updated as appropriate: She  has a past medical history of Arthritis, Hernia, umbilical, Hypertension, and Spinal stenosis  She   Patient Active Problem List    Diagnosis Date Noted   • Irritable bowel syndrome with diarrhea 03/15/2021   • Increased BMI 03/15/2021   • Polymyalgia rheumatica ([facility] [address] ) 07/30/2020   • Arthritis    • Hernia, umbilical    • Spinal stenosis 04/20/2020   • Hypertension 04/20/2020   • Iron deficiency anemia, unspecified 01/08/2019   • Essential (hemorrhagic) thrombocythemia ([facility] [address] ) 08/07/2018   • Anemia 08/07/2018     She  has a past surgical history that includes Hysterectomy; Foot surgery; Hernia repair; Tonsillectomy; Mammo (historical) (2017); and Breast cyst excision (Left, 1998)  Her family history includes Breast cancer in her maternal grandmother; Cancer in her father; Colon cancer (age of onset: 62) in her father; Hypertension in her mother; No Known Problems in her maternal aunt, maternal aunt, maternal aunt, maternal aunt, paternal aunt, paternal aunt, sister, and sister  She  reports that she has never smoked   She has never used smokeless tobacco  She reports that she does not drink alcohol and does not use drugs  Current Outpatient Medications   Medication Sig Dispense Refill   • famotidine (PEPCID) 20 mg tablet Take 1 tablet (20 mg total) by mouth 2 (two) times a day 100 tablet 3   • baclofen 20 mg tablet TAKE 1 TABLET BY MOUTH FOUR TIMES DAILY 120 tablet 6   • Calcium Carbonate-Vitamin D 600-200 MG-UNIT TABS Take by mouth       • celecoxib (CeleBREX) 200 mg capsule TAKE 1 CAPSULE(200 MG) BY MOUTH DAILY 30 capsule 1   • Cholecalciferol (VITAMIN D PO) Take 5,000 Units by mouth     • denosumab (PROLIA) 60 mg/mL Inject under the skin     • Ferrous Sulfate (IRON PO) Take 65 mg by mouth daily     • levothyroxine 50 mcg tablet TAKE 1 TABLET BY MOUTH EVERY DAY AS DIRECTED 90 tablet 3   • Multiple Vitamins-Minerals (CENTRUM SILVER 50+WOMEN) TABS Take by mouth       • potassium chloride (MICRO-K) 10 MEQ CR capsule TAKE 2 CAPSULES BY MOUTH TWICE DAILY AS DIRECTED 360 capsule 3   • rosuvastatin (CRESTOR) 20 MG tablet TAKE 1 TABLET BY MOUTH EVERY DAY AS DIRECTED 90 tablet 3   • telmisartan (MICARDIS) 80 MG tablet Take 1 tablet (80 mg total) by mouth daily 30 tablet 6   • zolpidem (AMBIEN) 5 mg tablet Take by mouth       No current facility-administered medications for this visit       Current Outpatient Medications on File Prior to Visit   Medication Sig   • famotidine (PEPCID) 20 mg tablet Take 1 tablet (20 mg total) by mouth 2 (two) times a day   • baclofen 20 mg tablet TAKE 1 TABLET BY MOUTH FOUR TIMES DAILY   • Calcium Carbonate-Vitamin D 600-200 MG-UNIT TABS Take by mouth     • celecoxib (CeleBREX) 200 mg capsule TAKE 1 CAPSULE(200 MG) BY MOUTH DAILY   • Cholecalciferol (VITAMIN D PO) Take 5,000 Units by mouth   • denosumab (PROLIA) 60 mg/mL Inject under the skin   • Ferrous Sulfate (IRON PO) Take 65 mg by mouth daily   • levothyroxine 50 mcg tablet TAKE 1 TABLET BY MOUTH EVERY DAY AS DIRECTED   • Multiple Vitamins-Minerals (CENTRUM SILVER 50+WOMEN) TABS Take by mouth     • potassium chloride (MICRO-K) 10 MEQ CR capsule TAKE 2 CAPSULES BY MOUTH TWICE DAILY AS DIRECTED   • rosuvastatin (CRESTOR) 20 MG tablet TAKE 1 TABLET BY MOUTH EVERY DAY AS DIRECTED   • telmisartan (MICARDIS) 80 MG tablet Take 1 tablet (80 mg total) by mouth daily   • zolpidem (AMBIEN) 5 mg tablet Take by mouth   • [DISCONTINUED] diphenoxylate-atropine (LOMOTIL) 2 5-0 025 mg per tablet TAKE 1 TABLET BY MOUTH TWICE DAILY (Patient not taking: No sig reported)   • [DISCONTINUED] esomeprazole (NexIUM) 40 MG capsule Take by mouth     No current facility-administered medications on file prior to visit  She is allergic to aspirin and ibuprofen       Review of Systems   Constitutional: Negative for activity change (in WC), appetite change, chills, diaphoresis, fatigue and fever  HENT: Negative for congestion, ear discharge, ear pain, facial swelling, nosebleeds, sore throat, tinnitus, trouble swallowing and voice change  Eyes: Negative for photophobia, pain, discharge, redness, itching and visual disturbance  Respiratory: Negative for apnea, cough, choking, chest tightness and shortness of breath  Cardiovascular: Negative for chest pain, palpitations and leg swelling  Denies any and all cardiac symptoms   Gastrointestinal: Negative for abdominal distention, abdominal pain, blood in stool, constipation, diarrhea, nausea and vomiting  Endocrine: Negative for cold intolerance, heat intolerance, polydipsia, polyphagia and polyuria  Genitourinary: Negative for decreased urine volume, difficulty urinating, dysuria, enuresis, frequency, hematuria, pelvic pain, urgency and vaginal bleeding  Musculoskeletal: Positive for arthralgias, back pain, gait problem and myalgias  Negative for joint swelling, neck pain and neck stiffness  Severe scoliosis with kyphosis throughout spine especially lumbar spine  History of PMR on prednisone  Sees Dr Rosaura Lee q6 mo and he is rx'ing the arthritis   Skin: Positive for pallor   Negative for color change and rash  Allergic/Immunologic: Negative for immunocompromised state  Neurological: Negative for dizziness, seizures, facial asymmetry, light-headedness, numbness and headaches  Hematological: Negative for adenopathy  Psychiatric/Behavioral: Negative for agitation, behavioral problems, confusion, decreased concentration, dysphoric mood and hallucinations  Objective:      /82 (BP Location: Left arm, Patient Position: Sitting, Cuff Size: Standard)   Pulse (!) 50   Temp 98 5 °F (36 9 °C) (Tympanic)   Resp 16   Ht 4' 9" (1 448 m) Comment: From previous OV  Unable to obtain  Wt 57 6 kg (127 lb)   SpO2 99%   BMI 27 48 kg/m²          Physical Exam  Vitals and nursing note reviewed  Constitutional:       General: She is not in acute distress  Appearance: Normal appearance  She is well-developed  She is obese  She is not ill-appearing, toxic-appearing or diaphoretic  HENT:      Head: Normocephalic and atraumatic  Nose: Nose normal    Eyes:      General: No scleral icterus  Right eye: No discharge  Left eye: No discharge  Conjunctiva/sclera: Conjunctivae normal       Pupils: Pupils are equal, round, and reactive to light  Neck:      Thyroid: No thyromegaly  Trachea: No tracheal deviation  Cardiovascular:      Rate and Rhythm: Normal rate and regular rhythm  Heart sounds: Normal heart sounds  Pulmonary:      Effort: No respiratory distress  Breath sounds: Normal breath sounds  No wheezing or rales  Chest:      Chest wall: No tenderness  Abdominal:      Palpations: Abdomen is soft  Musculoskeletal:         General: No tenderness or deformity  Normal range of motion  Cervical back: Normal range of motion and neck supple  Right lower leg: No edema  Left lower leg: No edema  Comments: Patient with many many years severe kyphoscoliosis lumbar spine    Cannot ambulate without walker and review of rheumatologist notes suggest impending surgery needed  Skin:     General: Skin is warm and dry  Coloration: Skin is not pale  Findings: No erythema or rash  Neurological:      General: No focal deficit present  Mental Status: She is alert and oriented to person, place, and time  Cranial Nerves: No cranial nerve deficit  Gait: Gait abnormal       Deep Tendon Reflexes: Reflexes abnormal    Psychiatric:         Mood and Affect: Mood normal          Behavior: Behavior normal          Thought Content: Thought content normal          Judgment: Judgment normal          30 min with pt from 12:10 pm to 12:44 pm  Seen with   This time was spent reviewing previous records, reviewing previous laboratory and other tests, taking history from patient, examination of patient, discussion of prognosis and treatment, ordering laboratory tests, ordering medications, and completion of the medical record

## 2022-11-04 DIAGNOSIS — I10 ESSENTIAL HYPERTENSION: ICD-10-CM

## 2022-11-04 DIAGNOSIS — M19.90 ARTHRITIS: ICD-10-CM

## 2022-11-04 RX ORDER — TELMISARTAN 80 MG/1
TABLET ORAL
Qty: 30 TABLET | Refills: 6 | Status: SHIPPED | OUTPATIENT
Start: 2022-11-04

## 2022-11-06 ENCOUNTER — OFFICE VISIT (OUTPATIENT)
Dept: URGENT CARE | Facility: CLINIC | Age: 72
End: 2022-11-06

## 2022-11-06 VITALS
HEART RATE: 82 BPM | OXYGEN SATURATION: 98 % | WEIGHT: 127 LBS | SYSTOLIC BLOOD PRESSURE: 142 MMHG | HEIGHT: 57 IN | DIASTOLIC BLOOD PRESSURE: 75 MMHG | BODY MASS INDEX: 27.4 KG/M2

## 2022-11-06 DIAGNOSIS — R05.1 ACUTE COUGH: Primary | ICD-10-CM

## 2022-11-06 RX ORDER — BENZONATATE 100 MG/1
100 CAPSULE ORAL 3 TIMES DAILY PRN
Qty: 20 CAPSULE | Refills: 0 | Status: SHIPPED | OUTPATIENT
Start: 2022-11-06

## 2022-11-06 NOTE — PROGRESS NOTES
[facility]        NAME: Trish Blanton is a 81 y o  female  : 1950    MRN: [id number]  DATE: 2022  TIME: 4:16 PM    Assessment and Orders   Acute cough [R05 1]  1  Acute cough  benzonatate (TESSALON PERLES) 100 mg capsule         Plan and Discussion      Symptoms and exam consistent with acute cough likely viral in nature  Will treat symptomatically with Tessalon Perles      Risks and benefits discussed  Patient understands and agrees with the plan  Follow up with PCP  Chief Complaint     Chief Complaint   Patient presents with   • Cough     Cough- started 22         History of Present Illness       Cough  This is a new problem  The current episode started in the past 7 days  The problem has been gradually worsening  Associated symptoms include rhinorrhea  Pertinent negatives include no fever, heartburn, nasal congestion, postnasal drip, sore throat or shortness of breath  Risk factors for lung disease include smoking/tobacco exposure  There is no history of asthma or COPD  Review of Systems   Review of Systems   Constitutional: Negative for fever  HENT: Positive for rhinorrhea  Negative for postnasal drip and sore throat  Respiratory: Positive for cough  Negative for shortness of breath  Gastrointestinal: Negative for heartburn           Current Medications       Current Outpatient Medications:   •  baclofen 20 mg tablet, TAKE 1 TABLET BY MOUTH FOUR TIMES DAILY, Disp: 120 tablet, Rfl: 6  •  benzonatate (TESSALON PERLES) 100 mg capsule, Take 1 capsule (100 mg total) by mouth 3 (three) times a day as needed for cough, Disp: 20 capsule, Rfl: 0  •  Calcium Carbonate-Vitamin D 600-200 MG-UNIT TABS, Take by mouth  , Disp: , Rfl:   •  celecoxib (CeleBREX) 200 mg capsule, TAKE 1 CAPSULE(200 MG) BY MOUTH DAILY, Disp: 30 capsule, Rfl: 1  •  Cholecalciferol (VITAMIN D PO), Take 5,000 Units by mouth, Disp: , Rfl:   •  denosumab (PROLIA) 60 mg/mL, Inject under the skin, Disp: , Rfl:   •  famotidine (PEPCID) 20 mg tablet, Take 1 tablet (20 mg total) by mouth 2 (two) times a day, Disp: 100 tablet, Rfl: 3  •  Ferrous Sulfate (IRON PO), Take 65 mg by mouth daily, Disp: , Rfl:   •  levothyroxine 50 mcg tablet, TAKE 1 TABLET BY MOUTH EVERY DAY AS DIRECTED, Disp: 90 tablet, Rfl: 3  •  Multiple Vitamins-Minerals (CENTRUM SILVER 50+WOMEN) TABS, Take by mouth  , Disp: , Rfl:   •  potassium chloride (MICRO-K) 10 MEQ CR capsule, TAKE 2 CAPSULES BY MOUTH TWICE DAILY AS DIRECTED, Disp: 360 capsule, Rfl: 3  •  rosuvastatin (CRESTOR) 20 MG tablet, TAKE 1 TABLET BY MOUTH EVERY DAY AS DIRECTED, Disp: 90 tablet, Rfl: 3  •  telmisartan (MICARDIS) 80 MG tablet, TAKE 1 TABLET(80 MG) BY MOUTH DAILY, Disp: 30 tablet, Rfl: 6  •  zolpidem (AMBIEN) 5 mg tablet, Take by mouth, Disp: , Rfl:     Current Allergies     Allergies as of 11/06/2022 - Reviewed 11/06/2022   Allergen Reaction Noted   • Aspirin  11/23/2016   • Ibuprofen  11/23/2016            The following portions of the patient's history were reviewed and updated as appropriate: allergies, current medications, past family history, past medical history, past social history, past surgical history and problem list      Past Medical History:   Diagnosis Date   • Arthritis    • Hernia, umbilical    • Hypertension    • Spinal stenosis        Past Surgical History:   Procedure Laterality Date   • BREAST CYST EXCISION Left 1998   • FOOT SURGERY      Toe   • HERNIA REPAIR     • HYSTERECTOMY     • MAMMO (HISTORICAL)  2017   • TONSILLECTOMY         Family History   Problem Relation Age of Onset   • Hypertension Mother    • Cancer Father    • Colon cancer Father 62   • No Known Problems Sister    • Breast cancer Maternal Grandmother         not sure of her age of onset   • No Known Problems Sister    • No Known Problems Maternal Aunt    • No Known Problems Maternal Aunt    • No Known Problems Maternal Aunt    • No Known Problems Maternal Aunt    • No Known Problems Paternal Aunt    • No Known Problems Paternal Aunt          Medications have been verified  Objective   /75   Pulse 82   Ht 4' 9" (1 448 m)   Wt 57 6 kg (127 lb)   SpO2 98%   BMI 27 48 kg/m²   No LMP recorded  Patient has had a hysterectomy  Physical Exam     Physical Exam  Constitutional:       General: She is not in acute distress  Appearance: She is not ill-appearing  HENT:      Mouth/Throat:      Mouth: Mucous membranes are moist       Pharynx: No posterior oropharyngeal erythema  Cardiovascular:      Rate and Rhythm: Normal rate and regular rhythm  Pulmonary:      Effort: Pulmonary effort is normal  No respiratory distress  Breath sounds: No wheezing, rhonchi or rales  Skin:     General: Skin is warm  Neurological:      General: No focal deficit present  Mental Status: She is alert and oriented to person, place, and time     Psychiatric:         Mood and Affect: Mood normal          Behavior: Behavior normal                Shantel Chamorro DO

## 2022-11-07 RX ORDER — CELECOXIB 200 MG/1
CAPSULE ORAL
Qty: 30 CAPSULE | Refills: 1 | Status: SHIPPED | OUTPATIENT
Start: 2022-11-07

## 2022-11-10 ENCOUNTER — APPOINTMENT (OUTPATIENT)
Dept: LAB | Facility: CLINIC | Age: 72
End: 2022-11-10

## 2022-11-10 ENCOUNTER — TELEPHONE (OUTPATIENT)
Dept: ADMINISTRATIVE | Facility: HOSPITAL | Age: 72
End: 2022-11-10

## 2022-11-10 DIAGNOSIS — K58.0 IRRITABLE BOWEL SYNDROME WITH DIARRHEA: Primary | ICD-10-CM

## 2022-11-10 DIAGNOSIS — D47.2 MONOCLONAL GAMMOPATHY OF UNDETERMINED SIGNIFICANCE: ICD-10-CM

## 2022-11-10 NOTE — TELEPHONE ENCOUNTER
Pt lvm on prescription line requesting med below to be called into her pharmacy on file        diphenoxylate-atropine (LOMOTIL) 2 5-0 025 mg per tablet

## 2022-11-11 LAB
KAPPA LC FREE SER-MCNC: 38.4 MG/L (ref 3.3–19.4)
KAPPA LC FREE/LAMBDA FREE SER: 1.62 {RATIO} (ref 0.26–1.65)
LAMBDA LC FREE SERPL-MCNC: 23.7 MG/L (ref 5.7–26.3)

## 2022-11-11 RX ORDER — DIPHENOXYLATE HYDROCHLORIDE AND ATROPINE SULFATE 2.5; .025 MG/1; MG/1
1 TABLET ORAL 4 TIMES DAILY PRN
Qty: 40 TABLET | Refills: 1 | Status: SHIPPED | OUTPATIENT
Start: 2022-11-11

## 2022-11-12 LAB — B2 MICROGLOB SERPL-MCNC: 3.5 MG/L (ref 0.6–2.4)

## 2022-12-01 ENCOUNTER — APPOINTMENT (OUTPATIENT)
Dept: LAB | Facility: CLINIC | Age: 72
End: 2022-12-01

## 2022-12-01 DIAGNOSIS — D75.839 THROMBOCYTOSIS: ICD-10-CM

## 2022-12-01 LAB
BASOPHILS # BLD AUTO: 0.06 THOUSANDS/ÂΜL (ref 0–0.1)
BASOPHILS NFR BLD AUTO: 2 % (ref 0–1)
EOSINOPHIL # BLD AUTO: 0.28 THOUSAND/ÂΜL (ref 0–0.61)
EOSINOPHIL NFR BLD AUTO: 7 % (ref 0–6)
ERYTHROCYTE [DISTWIDTH] IN BLOOD BY AUTOMATED COUNT: 13.6 % (ref 11.6–15.1)
HCT VFR BLD AUTO: 43.8 % (ref 34.8–46.1)
HGB BLD-MCNC: 13.7 G/DL (ref 11.5–15.4)
IMM GRANULOCYTES # BLD AUTO: 0.01 THOUSAND/UL (ref 0–0.2)
IMM GRANULOCYTES NFR BLD AUTO: 0 % (ref 0–2)
LYMPHOCYTES # BLD AUTO: 1.14 THOUSANDS/ÂΜL (ref 0.6–4.47)
LYMPHOCYTES NFR BLD AUTO: 29 % (ref 14–44)
MCH RBC QN AUTO: 29.1 PG (ref 26.8–34.3)
MCHC RBC AUTO-ENTMCNC: 31.3 G/DL (ref 31.4–37.4)
MCV RBC AUTO: 93 FL (ref 82–98)
MONOCYTES # BLD AUTO: 0.53 THOUSAND/ÂΜL (ref 0.17–1.22)
MONOCYTES NFR BLD AUTO: 13 % (ref 4–12)
NEUTROPHILS # BLD AUTO: 1.98 THOUSANDS/ÂΜL (ref 1.85–7.62)
NEUTS SEG NFR BLD AUTO: 49 % (ref 43–75)
NRBC BLD AUTO-RTO: 0 /100 WBCS
PLATELET # BLD AUTO: 344 THOUSANDS/UL (ref 149–390)
PMV BLD AUTO: 8.8 FL (ref 8.9–12.7)
RBC # BLD AUTO: 4.7 MILLION/UL (ref 3.81–5.12)
WBC # BLD AUTO: 4 THOUSAND/UL (ref 4.31–10.16)

## 2022-12-09 ENCOUNTER — OFFICE VISIT (OUTPATIENT)
Dept: HEMATOLOGY ONCOLOGY | Facility: CLINIC | Age: 72
End: 2022-12-09

## 2022-12-09 VITALS
DIASTOLIC BLOOD PRESSURE: 88 MMHG | HEART RATE: 61 BPM | HEIGHT: 57 IN | OXYGEN SATURATION: 98 % | TEMPERATURE: 98.2 F | BODY MASS INDEX: 27.61 KG/M2 | WEIGHT: 128 LBS | SYSTOLIC BLOOD PRESSURE: 138 MMHG

## 2022-12-09 DIAGNOSIS — D47.2 MGUS (MONOCLONAL GAMMOPATHY OF UNKNOWN SIGNIFICANCE): ICD-10-CM

## 2022-12-09 DIAGNOSIS — D75.839 THROMBOCYTOSIS: Primary | ICD-10-CM

## 2022-12-09 NOTE — PROGRESS NOTES
Hematology/Oncology Outpatient Follow-up  Yuri Dates 81 y o  female 1950 [id number]    Date:  12/9/2022      Assessment and Plan:  1  Thrombocytosis  History of, not present at this time  Routine CBC-D monitoring       - CBC and differential; Future    2  MGUS (monoclonal gammopathy of unknown significance)  SPEP showed IgG kappa 0 46 g/dL   Light chain ratio normal   Beta-2 microglobulin 3 5 mg/dL   CBC and CMP normal      Consistent with MGUS  Recommend to assess on yearly basis  - Protein electrophoresis, serum; Future  - Immunoglobulin free LT chains blood; Future  - Beta 2 microglobulin, serum; Future  - IgG, IgA, IgM; Future      HPI:  27-year-old female with history of thrombocytosis and iron deficiency   Patient has had thrombocytosis for approximately 7 years  Aracelis Webb previously was followed with Blair Spann had workup which was negative for JAK2 mutation, negative bcr/ABL  Reticulocyte count was normal   Iron levels in September 2018 showed chronic disease  Occult stool testing was negative   Sed rate and CRP were elevated this is likely due to her underlying rheumatological condition for which she follows with rheumatology and has been on prednisone 5 mg since August 2018  She has follow-up with rheumatology in the next 1 month      Repeat labs in January 2019 showed a ferritin of 12, previously 182 in August 2018  Hemoglobin in May 2019 decreased 11 3   Patient was advised on 05/24/2019 to start taking 1 iron tablet daily      She stated that she really had an EGD and colonoscopy with Dr Navi Anaya on 05/14/2019 and had polyps found in the stomach and colon which were all removed      Interval history: Endocrinologist ordered SPEP, this showed a monoclonal spike, PCP ordered remainder of labs which were unrevealing    ROS: Review of Systems   Constitutional: Negative for activity change, appetite change, chills, fatigue, fever and unexpected weight change     Respiratory: Negative for cough and shortness of breath  Cardiovascular: Negative for chest pain, palpitations and leg swelling  Gastrointestinal: Negative for abdominal pain, constipation, diarrhea, nausea and vomiting  Genitourinary: Negative for difficulty urinating, dysuria and hematuria  Musculoskeletal: Negative for arthralgias  Skin: Negative  Neurological: Negative for dizziness, weakness, light-headedness, numbness and headaches  Hematological: Negative  Psychiatric/Behavioral: Negative  Past Medical History:   Diagnosis Date   • Arthritis    • Hernia, umbilical    • Hypertension    • Spinal stenosis        Past Surgical History:   Procedure Laterality Date   • BREAST CYST EXCISION Left    • FOOT SURGERY      Toe   • HERNIA REPAIR     • HYSTERECTOMY     • MAMMO (HISTORICAL)     • TONSILLECTOMY         Social History     Socioeconomic History   • Marital status: /Civil Union     Spouse name: Not on file   • Number of children: Not on file   • Years of education: Not on file   • Highest education level: Not on file   Occupational History   • Occupation: Retired [employer]   Tobacco Use   • Smoking status: Never   • Smokeless tobacco: Never   Vaping Use   • Vaping Use: Never used   Substance and Sexual Activity   • Alcohol use: No   • Drug use: No   • Sexual activity: Yes     Partners: Male     Birth control/protection: Post-menopausal   Other Topics Concern   • Not on file   Social History Narrative    · Most recent tobacco use screenin2019      · Occupation:   Retired -       · Caffeine intake: Moderate  Daily Tea (at least one cup)     · Illicit drugs:   None      · Guns present in home:   No      · Seat belts used routinely:   Yes      · Sunscreen used routinely:   Yes      · Smoke alarm in home:    Yes      · Advance directive:   Unsure     · Salt Intake:   Very Little      · Has the Patient had a mammogram to screen for breast cancer within 24 months:   Yes Social Determinants of Health     Financial Resource Strain: Not on file   Food Insecurity: Not on file   Transportation Needs: Not on file   Physical Activity: Not on file   Stress: Not on file   Social Connections: Not on file   Intimate Partner Violence: Not on file   Housing Stability: Not on file       Family History   Problem Relation Age of Onset   • Hypertension Mother    • Cancer Father    • Colon cancer Father 62   • No Known Problems Sister    • Breast cancer Maternal Grandmother         not sure of her age of onset   • No Known Problems Sister    • No Known Problems Maternal Aunt    • No Known Problems Maternal Aunt    • No Known Problems Maternal Aunt    • No Known Problems Maternal Aunt    • No Known Problems Paternal Aunt    • No Known Problems Paternal Aunt        Allergies   Allergen Reactions   • Aspirin    • Ibuprofen          Current Outpatient Medications:   •  baclofen 20 mg tablet, TAKE 1 TABLET BY MOUTH FOUR TIMES DAILY, Disp: 120 tablet, Rfl: 6  •  Calcium Carbonate-Vitamin D 600-200 MG-UNIT TABS, Take by mouth  , Disp: , Rfl:   •  celecoxib (CeleBREX) 200 mg capsule, TAKE 1 CAPSULE(200 MG) BY MOUTH DAILY, Disp: 30 capsule, Rfl: 1  •  Cholecalciferol (VITAMIN D PO), Take 5,000 Units by mouth, Disp: , Rfl:   •  denosumab (PROLIA) 60 mg/mL, Inject under the skin, Disp: , Rfl:   •  diphenoxylate-atropine (LOMOTIL) 2 5-0 025 mg per tablet, Take 1 tablet by mouth 4 (four) times a day as needed for diarrhea, Disp: 40 tablet, Rfl: 1  •  famotidine (PEPCID) 20 mg tablet, Take 1 tablet (20 mg total) by mouth 2 (two) times a day, Disp: 100 tablet, Rfl: 3  •  Ferrous Sulfate (IRON PO), Take 65 mg by mouth daily, Disp: , Rfl:   •  levothyroxine 50 mcg tablet, TAKE 1 TABLET BY MOUTH EVERY DAY AS DIRECTED, Disp: 90 tablet, Rfl: 3  •  Multiple Vitamins-Minerals (CENTRUM SILVER 50+WOMEN) TABS, Take by mouth  , Disp: , Rfl:   •  potassium chloride (MICRO-K) 10 MEQ CR capsule, TAKE 2 CAPSULES BY MOUTH TWICE DAILY AS DIRECTED, Disp: 360 capsule, Rfl: 3  •  rosuvastatin (CRESTOR) 20 MG tablet, TAKE 1 TABLET BY MOUTH EVERY DAY AS DIRECTED, Disp: 90 tablet, Rfl: 3  •  telmisartan (MICARDIS) 80 MG tablet, TAKE 1 TABLET(80 MG) BY MOUTH DAILY, Disp: 30 tablet, Rfl: 6  •  zolpidem (AMBIEN) 5 mg tablet, Take by mouth, Disp: , Rfl:   •  benzonatate (TESSALON PERLES) 100 mg capsule, Take 1 capsule (100 mg total) by mouth 3 (three) times a day as needed for cough (Patient not taking: Reported on 12/9/2022), Disp: 20 capsule, Rfl: 0      Physical Exam:  /88 (BP Location: Left arm, Patient Position: Sitting, Cuff Size: Standard)   Pulse 61   Temp 98 2 °F (36 8 °C)   Ht 4' 9" (1 448 m)   Wt 58 1 kg (128 lb)   SpO2 98%   BMI 27 70 kg/m²     Physical Exam  Vitals reviewed  Constitutional:       General: She is not in acute distress  Appearance: She is well-developed  She is not ill-appearing  HENT:      Head: Normocephalic and atraumatic  Eyes:      General: No scleral icterus  Conjunctiva/sclera: Conjunctivae normal    Cardiovascular:      Rate and Rhythm: Normal rate and regular rhythm  Heart sounds: Normal heart sounds  No murmur heard  Pulmonary:      Effort: Pulmonary effort is normal  No respiratory distress  Breath sounds: Normal breath sounds  Abdominal:      Palpations: Abdomen is soft  Tenderness: There is no abdominal tenderness  Musculoskeletal:         General: No tenderness  Normal range of motion  Cervical back: Normal range of motion and neck supple  Right lower leg: No edema  Left lower leg: No edema  Comments: Chronic kyphosis, ambulates with a walker   Lymphadenopathy:      Cervical: No cervical adenopathy  Skin:     General: Skin is warm and dry  Neurological:      Mental Status: She is alert and oriented to person, place, and time  Cranial Nerves: No cranial nerve deficit     Psychiatric:         Mood and Affect: Mood normal  Behavior: Behavior normal            Labs:  Lab Results   Component Value Date    WBC 4 00 (L) 12/01/2022    HGB 13 7 12/01/2022    HCT 43 8 12/01/2022    MCV 93 12/01/2022     12/01/2022     Lab Results   Component Value Date    K 4 3 10/20/2022     (H) 10/20/2022    CO2 22 10/20/2022    BUN 21 10/20/2022    CREATININE 0 71 10/20/2022    GLUF 93 10/20/2022    CALCIUM 9 1 10/20/2022    CORRECTEDCA 9 6 04/13/2022    AST 17 10/20/2022    ALT 11 10/20/2022    ALKPHOS 77 10/20/2022    EGFR 86 10/20/2022       Patient voiced understanding and agreement in the above discussion  Aware to contact our office with questions/symptoms in the interim  This note has been generated by voice recognition software system  Therefore, there may be spelling, grammar, and or syntax errors  Please contact if questions arise

## 2023-01-03 ENCOUNTER — OFFICE VISIT (OUTPATIENT)
Dept: OBGYN CLINIC | Facility: CLINIC | Age: 73
End: 2023-01-03

## 2023-01-03 VITALS — HEIGHT: 57 IN | BODY MASS INDEX: 27.18 KG/M2 | WEIGHT: 126 LBS

## 2023-01-03 DIAGNOSIS — M17.11 PRIMARY OSTEOARTHRITIS OF RIGHT KNEE: Primary | ICD-10-CM

## 2023-01-03 DIAGNOSIS — M17.12 PRIMARY OSTEOARTHRITIS OF LEFT KNEE: ICD-10-CM

## 2023-01-03 NOTE — PROGRESS NOTES
Assessment:  1  Primary osteoarthritis of right knee        2  Primary osteoarthritis of left knee            Plan:    • Patient has chronic bilateral knee pain due to severe osteoarthritis   • Weight bearing as tolerated bilateral lower extremity   • Provided patein VMO strengthening exercises   • Will be ordering bilateral knee Durolane injections   • Will contact patient when injections have been approved   • Follow up PRN         The above stated was discussed in layman's terms and the patient expressed understanding  All questions were answered to the patient's satisfaction  Subjective:   Chauncey Robison is a 81 y o  female who presents today consultation for bilateral knee pain  She was referred by her PCP Dr Vicky Gutierrez  She has been having  Bilateral knee pain for several years off and on  She has history of right tibial plateau fracture treated nonoperatively in 2009  She is having pain over anterior left knee and lateral aspect of the right knee  She does feel her knees are unstable and going to buckle when walking  Her pain is worse with walking and transitional positions  She is using a rollator for assistance when ambulating  She is taking Tylenol and Celebrex 200 mg for pain relief  She does treat with a rheumatology  She has hx of mild case of cerebral palsy as a child and wears a AFO on the  right lower extremity secondary to foot drop        Review of systems negative unless otherwise specified in HPI    Past Medical History:   Diagnosis Date   • Arthritis    • Hernia, umbilical    • Hypertension    • Spinal stenosis        Past Surgical History:   Procedure Laterality Date   • BREAST CYST EXCISION Left 1998   • FOOT SURGERY      Toe   • HERNIA REPAIR     • HYSTERECTOMY     • MAMMO (HISTORICAL)  2017   • TONSILLECTOMY         Family History   Problem Relation Age of Onset   • Hypertension Mother    • Cancer Father    • Colon cancer Father 62   • No Known Problems Sister    • Breast cancer Maternal Grandmother         not sure of her age of onset   • No Known Problems Sister    • No Known Problems Maternal Aunt    • No Known Problems Maternal Aunt    • No Known Problems Maternal Aunt    • No Known Problems Maternal Aunt    • No Known Problems Paternal Aunt    • No Known Problems Paternal Aunt        Social History     Occupational History   • Occupation: Retired [place]   Tobacco Use   • Smoking status: Never   • Smokeless tobacco: Never   Vaping Use   • Vaping Use: Never used   Substance and Sexual Activity   • Alcohol use: No   • Drug use: No   • Sexual activity: Yes     Partners: Male     Birth control/protection: Post-menopausal         Current Outpatient Medications:   •  baclofen 20 mg tablet, TAKE 1 TABLET BY MOUTH FOUR TIMES DAILY, Disp: 120 tablet, Rfl: 6  •  Calcium Carbonate-Vitamin D 600-200 MG-UNIT TABS, Take by mouth  , Disp: , Rfl:   •  celecoxib (CeleBREX) 200 mg capsule, TAKE 1 CAPSULE(200 MG) BY MOUTH DAILY, Disp: 30 capsule, Rfl: 1  •  Cholecalciferol (VITAMIN D PO), Take 5,000 Units by mouth, Disp: , Rfl:   •  denosumab (PROLIA) 60 mg/mL, Inject under the skin, Disp: , Rfl:   •  diphenoxylate-atropine (LOMOTIL) 2 5-0 025 mg per tablet, Take 1 tablet by mouth 4 (four) times a day as needed for diarrhea, Disp: 40 tablet, Rfl: 1  •  famotidine (PEPCID) 20 mg tablet, Take 1 tablet (20 mg total) by mouth 2 (two) times a day, Disp: 100 tablet, Rfl: 3  •  Ferrous Sulfate (IRON PO), Take 65 mg by mouth daily, Disp: , Rfl:   •  levothyroxine 50 mcg tablet, TAKE 1 TABLET BY MOUTH EVERY DAY AS DIRECTED, Disp: 90 tablet, Rfl: 3  •  Multiple Vitamins-Minerals (CENTRUM SILVER 50+WOMEN) TABS, Take by mouth  , Disp: , Rfl:   •  potassium chloride (MICRO-K) 10 MEQ CR capsule, TAKE 2 CAPSULES BY MOUTH TWICE DAILY AS DIRECTED, Disp: 360 capsule, Rfl: 3  •  rosuvastatin (CRESTOR) 20 MG tablet, TAKE 1 TABLET BY MOUTH EVERY DAY AS DIRECTED, Disp: 90 tablet, Rfl: 3  •  telmisartan (MICARDIS) 80 MG tablet, TAKE 1 TABLET(80 MG) BY MOUTH DAILY, Disp: 30 tablet, Rfl: 6  •  zolpidem (AMBIEN) 5 mg tablet, Take by mouth, Disp: , Rfl:   •  benzonatate (TESSALON PERLES) 100 mg capsule, Take 1 capsule (100 mg total) by mouth 3 (three) times a day as needed for cough (Patient not taking: Reported on 12/9/2022), Disp: 20 capsule, Rfl: 0    Allergies   Allergen Reactions   • Aspirin    • Ibuprofen             Vitals:       Objective:            Physical Exam  Physical Exam:      General Appearance:    Alert, cooperative, no distress, appears stated age   Head:    Normocephalic, without obvious abnormality, atraumatic   Eyes:    conjunctiva/corneas clear, both eyes         Nose:   Nares normal, septum midline, no drainage    Throat:   Lips normal; teeth and gums normal   Neck:    symmetrical, trachea midline, ;     thyroid:  no enlargement/   Back:     Symmetric, no curvature, ROM normal   Lungs:   No audible wheezing or labored breathing   Chest Wall:    No tenderness or deformity    Heart:    Regular rate and rhythm                         Pulses:   2+ and symmetric all extremities   Skin:   Skin color, texture, turgor normal, no rashes or lesions   Neurologic:   normal strength, sensation and reflexes     throughout                       Ortho Exam   Right knee  Skin intact  No erythema   DF 3/5 strength   PF 4/5 strength   TTP medial and lateral joint lines   Stable to varus and valgus stress   Sensation intact over superficial and deep peroneal nerve regions    Left knee  Skin intact  No erythema   TTP medial and lateral joint lines    Stable to varus and valgus stress     Diagnostics, reviewed and taken today if performed as documented:     The attending physician has personally reviewed the pertinent films in PACS and interpretation is as follows: x-ray bilateral knee demonstrates severe compartmental osteoarthritis , left knee valgus deformity and right knee varus deformity         Procedures, if performed today:    Procedures    None performed      Scribe Attestation    I,:  Diego Huston am acting as a scribe while in the presence of the attending physician :       I,:  Zaina Jeffery MD personally performed the services described in this documentation    as scribed in my presence :             Portions of the record may have been created with voice recognition software  Occasional wrong word or "sound a like" substitutions may have occurred due to the inherent limitations of voice recognition software  Read the chart carefully and recognize, using context, where substitutions have occurred

## 2023-01-18 DIAGNOSIS — M19.90 ARTHRITIS: ICD-10-CM

## 2023-01-19 ENCOUNTER — APPOINTMENT (OUTPATIENT)
Dept: LAB | Facility: CLINIC | Age: 73
End: 2023-01-19

## 2023-01-20 RX ORDER — CELECOXIB 200 MG/1
CAPSULE ORAL
Qty: 30 CAPSULE | Refills: 1 | Status: SHIPPED | OUTPATIENT
Start: 2023-01-20

## 2023-01-27 ENCOUNTER — CLINICAL SUPPORT (OUTPATIENT)
Dept: FAMILY MEDICINE CLINIC | Facility: CLINIC | Age: 73
End: 2023-01-27

## 2023-01-27 DIAGNOSIS — M81.0 AGE-RELATED OSTEOPOROSIS WITHOUT CURRENT PATHOLOGICAL FRACTURE: Primary | ICD-10-CM

## 2023-02-09 ENCOUNTER — OFFICE VISIT (OUTPATIENT)
Dept: FAMILY MEDICINE CLINIC | Facility: CLINIC | Age: 73
End: 2023-02-09

## 2023-02-09 VITALS
OXYGEN SATURATION: 96 % | SYSTOLIC BLOOD PRESSURE: 142 MMHG | TEMPERATURE: 97.5 F | HEART RATE: 67 BPM | BODY MASS INDEX: 27.18 KG/M2 | DIASTOLIC BLOOD PRESSURE: 86 MMHG | WEIGHT: 126 LBS | HEIGHT: 57 IN

## 2023-02-09 DIAGNOSIS — M81.0 AGE-RELATED OSTEOPOROSIS WITHOUT CURRENT PATHOLOGICAL FRACTURE: Primary | ICD-10-CM

## 2023-02-09 DIAGNOSIS — E03.9 HYPOTHYROIDISM, UNSPECIFIED TYPE: ICD-10-CM

## 2023-02-09 DIAGNOSIS — Z79.899 ENCOUNTER FOR LONG-TERM (CURRENT) USE OF MEDICATIONS: ICD-10-CM

## 2023-02-09 DIAGNOSIS — E78.2 MIXED HYPERLIPIDEMIA: ICD-10-CM

## 2023-02-09 DIAGNOSIS — I10 ESSENTIAL HYPERTENSION: ICD-10-CM

## 2023-02-09 DIAGNOSIS — D47.2 MONOCLONAL GAMMOPATHY OF UNDETERMINED SIGNIFICANCE: ICD-10-CM

## 2023-02-09 NOTE — PROGRESS NOTES
Assessment/Plan:    BMI Counseling: Body mass index is 27 27 kg/m²  The BMI is above normal  Nutrition recommendations include decreasing portion sizes, encouraging healthy choices of fruits and vegetables, decreasing fast food intake, consuming healthier snacks, limiting drinks that contain sugar, moderation in carbohydrate intake, increasing intake of lean protein and reducing intake of saturated and trans fat  Exercise recommendations include moderate physical activity 150 minutes/week and exercising 3-5 times per week  No pharmacotherapy was ordered  Rationale for BMI follow-up plan is due to patient being overweight or obese  1  Age-related osteoporosis without current pathological fracture  Comments:  See letter of Dr Eliazar Britt and Dr Archana Castro     2  Essential hypertension  Comments:  BP top normal    3  Monoclonal gammopathy of undetermined significance  Comments:  Sees hem/onc  4  Hypothyroidism, unspecified type  Comments:  tolerating synthroid 50 mcg OD    5  Mixed hyperlipidemia  Comments:  cont on Crestor 20 mg OD    6  Encounter for long-term (current) use of medications  Comments:  all med reviewed  Subjective:      Patient ID: Yonathan Meehan is a 81 y o  female  HPI    The following portions of the patient's history were reviewed and updated as appropriate: She  has a past medical history of Arthritis, Hernia, umbilical, Hypertension, and Spinal stenosis  She   Patient Active Problem List    Diagnosis Date Noted   • Irritable bowel syndrome with diarrhea 03/15/2021   • Increased BMI 03/15/2021   • Polymyalgia rheumatica ([address] ) 07/30/2020   • Arthritis    • Hernia, umbilical    • Spinal stenosis 04/20/2020   • Hypertension 04/20/2020   • Iron deficiency anemia, unspecified 01/08/2019   • Essential (hemorrhagic) thrombocythemia ([address] ) 08/07/2018   • Anemia 08/07/2018     She  has a past surgical history that includes Hysterectomy; Foot surgery;  Hernia repair; Tonsillectomy; Mammo (historical) (2017); and Breast cyst excision (Left, 1998)  Her family history includes Breast cancer in her maternal grandmother; Cancer in her father; Colon cancer (age of onset: 62) in her father; Hypertension in her mother; No Known Problems in her maternal aunt, maternal aunt, maternal aunt, maternal aunt, paternal aunt, paternal aunt, sister, and sister  She  reports that she has never smoked  She has never used smokeless tobacco  She reports that she does not drink alcohol and does not use drugs    Current Outpatient Medications   Medication Sig Dispense Refill   • baclofen 20 mg tablet TAKE 1 TABLET BY MOUTH FOUR TIMES DAILY 120 tablet 6   • Calcium Carbonate-Vitamin D 600-200 MG-UNIT TABS Take by mouth       • celecoxib (CeleBREX) 200 mg capsule TAKE 1 CAPSULE(200 MG) BY MOUTH DAILY 30 capsule 1   • Cholecalciferol (VITAMIN D PO) Take 5,000 Units by mouth     • denosumab (PROLIA) 60 mg/mL Inject under the skin     • diphenoxylate-atropine (LOMOTIL) 2 5-0 025 mg per tablet Take 1 tablet by mouth 4 (four) times a day as needed for diarrhea 40 tablet 1   • famotidine (PEPCID) 20 mg tablet Take 1 tablet (20 mg total) by mouth 2 (two) times a day 100 tablet 3   • Ferrous Sulfate (IRON PO) Take 65 mg by mouth daily     • levothyroxine 50 mcg tablet TAKE 1 TABLET BY MOUTH EVERY DAY AS DIRECTED 90 tablet 3   • potassium chloride (MICRO-K) 10 MEQ CR capsule TAKE 2 CAPSULES BY MOUTH TWICE DAILY AS DIRECTED 360 capsule 3   • rosuvastatin (CRESTOR) 20 MG tablet TAKE 1 TABLET BY MOUTH EVERY DAY AS DIRECTED 90 tablet 3   • telmisartan (MICARDIS) 80 MG tablet TAKE 1 TABLET(80 MG) BY MOUTH DAILY 30 tablet 6   • zolpidem (AMBIEN) 5 mg tablet Take by mouth     • benzonatate (TESSALON PERLES) 100 mg capsule Take 1 capsule (100 mg total) by mouth 3 (three) times a day as needed for cough (Patient not taking: Reported on 12/9/2022) 20 capsule 0   • Multiple Vitamins-Minerals (CENTRUM SILVER 50+WOMEN) TABS Take by mouth   (Patient not taking: Reported on 2/9/2023)       No current facility-administered medications for this visit  Current Outpatient Medications on File Prior to Visit   Medication Sig   • baclofen 20 mg tablet TAKE 1 TABLET BY MOUTH FOUR TIMES DAILY   • Calcium Carbonate-Vitamin D 600-200 MG-UNIT TABS Take by mouth     • celecoxib (CeleBREX) 200 mg capsule TAKE 1 CAPSULE(200 MG) BY MOUTH DAILY   • Cholecalciferol (VITAMIN D PO) Take 5,000 Units by mouth   • denosumab (PROLIA) 60 mg/mL Inject under the skin   • diphenoxylate-atropine (LOMOTIL) 2 5-0 025 mg per tablet Take 1 tablet by mouth 4 (four) times a day as needed for diarrhea   • famotidine (PEPCID) 20 mg tablet Take 1 tablet (20 mg total) by mouth 2 (two) times a day   • Ferrous Sulfate (IRON PO) Take 65 mg by mouth daily   • levothyroxine 50 mcg tablet TAKE 1 TABLET BY MOUTH EVERY DAY AS DIRECTED   • potassium chloride (MICRO-K) 10 MEQ CR capsule TAKE 2 CAPSULES BY MOUTH TWICE DAILY AS DIRECTED   • rosuvastatin (CRESTOR) 20 MG tablet TAKE 1 TABLET BY MOUTH EVERY DAY AS DIRECTED   • telmisartan (MICARDIS) 80 MG tablet TAKE 1 TABLET(80 MG) BY MOUTH DAILY   • zolpidem (AMBIEN) 5 mg tablet Take by mouth   • benzonatate (TESSALON PERLES) 100 mg capsule Take 1 capsule (100 mg total) by mouth 3 (three) times a day as needed for cough (Patient not taking: Reported on 12/9/2022)   • Multiple Vitamins-Minerals (CENTRUM SILVER 50+WOMEN) TABS Take by mouth   (Patient not taking: Reported on 2/9/2023)     No current facility-administered medications on file prior to visit  She is allergic to aspirin and ibuprofen       Review of Systems   Constitutional: Negative for activity change (in WC), appetite change, chills, diaphoresis, fatigue and fever  HENT: Negative for congestion, ear discharge, ear pain, facial swelling, nosebleeds, sore throat, tinnitus, trouble swallowing and voice change      Eyes: Negative for photophobia, pain, discharge, redness, itching and visual disturbance  Respiratory: Negative for apnea, cough, choking, chest tightness and shortness of breath  Cardiovascular: Negative for chest pain, palpitations and leg swelling  Denies any and all cardiac symptoms   Gastrointestinal: Negative for abdominal distention, abdominal pain, blood in stool, constipation, diarrhea, nausea and vomiting  Endocrine: Negative for cold intolerance, heat intolerance, polydipsia, polyphagia and polyuria  Genitourinary: Negative for decreased urine volume, difficulty urinating, dysuria, enuresis, frequency, hematuria, pelvic pain, urgency and vaginal bleeding  Musculoskeletal: Positive for arthralgias, back pain, gait problem and myalgias  Negative for joint swelling, neck pain and neck stiffness  Severe scoliosis with kyphosis throughout spine especially lumbar spine  History of PMR on prednisone  Sees Dr Shelby Mcdowell q6 mo and he is rx'ing the arthritis   Skin: Positive for pallor  Negative for color change and rash  Allergic/Immunologic: Negative for immunocompromised state  Neurological: Negative for dizziness, seizures, facial asymmetry, light-headedness, numbness and headaches  Hematological: Negative for adenopathy  Psychiatric/Behavioral: Negative for agitation, behavioral problems, confusion, decreased concentration, dysphoric mood and hallucinations  Objective:      /86 (BP Location: Left arm, Patient Position: Sitting, Cuff Size: Standard)   Pulse 67   Temp 97 5 °F (36 4 °C) (Temporal)   Ht 4' 9" (1 448 m)   Wt 57 2 kg (126 lb)   SpO2 96%   BMI 27 27 kg/m²          Physical Exam  Vitals and nursing note reviewed  Constitutional:       General: She is not in acute distress  Appearance: Normal appearance  She is well-developed  She is obese  She is not ill-appearing, toxic-appearing or diaphoretic  HENT:      Head: Normocephalic and atraumatic        Nose: Nose normal    Eyes:      General: No scleral icterus  Right eye: No discharge  Left eye: No discharge  Conjunctiva/sclera: Conjunctivae normal       Pupils: Pupils are equal, round, and reactive to light  Neck:      Thyroid: No thyromegaly  Trachea: No tracheal deviation  Cardiovascular:      Rate and Rhythm: Normal rate and regular rhythm  Heart sounds: Normal heart sounds  Pulmonary:      Effort: No respiratory distress  Breath sounds: Normal breath sounds  No wheezing or rales  Chest:      Chest wall: No tenderness  Abdominal:      Palpations: Abdomen is soft  Musculoskeletal:         General: No tenderness or deformity  Normal range of motion  Cervical back: Normal range of motion and neck supple  Right lower leg: No edema  Left lower leg: No edema  Comments: Patient with many many years severe kyphoscoliosis lumbar spine  Cannot ambulate without walker and review of rheumatologist notes suggest impending surgery needed  Skin:     General: Skin is warm and dry  Coloration: Skin is not pale  Findings: No erythema or rash  Neurological:      General: No focal deficit present  Mental Status: She is alert and oriented to person, place, and time  Cranial Nerves: No cranial nerve deficit  Gait: Gait abnormal       Deep Tendon Reflexes: Reflexes abnormal    Psychiatric:         Mood and Affect: Mood normal          Behavior: Behavior normal          Thought Content: Thought content normal          Judgment: Judgment normal          35 min with pt and her   All recoreds reviewed:  Josh Dr Rey Ricks nd Dr Jd Anderson    This time was spent reviewing previous records, reviewing previous laboratory and other tests, taking history from patient, examination of patient, discussion of prognosis and treatment, ordering laboratory tests, ordering medications, and completion of the medical record

## 2023-02-21 ENCOUNTER — PROCEDURE VISIT (OUTPATIENT)
Dept: OBGYN CLINIC | Facility: CLINIC | Age: 73
End: 2023-02-21

## 2023-02-21 VITALS
HEART RATE: 79 BPM | BODY MASS INDEX: 27.18 KG/M2 | DIASTOLIC BLOOD PRESSURE: 87 MMHG | SYSTOLIC BLOOD PRESSURE: 145 MMHG | HEIGHT: 57 IN | WEIGHT: 126 LBS

## 2023-02-21 DIAGNOSIS — M17.11 PRIMARY OSTEOARTHRITIS OF RIGHT KNEE: Primary | ICD-10-CM

## 2023-02-21 DIAGNOSIS — M17.12 PRIMARY OSTEOARTHRITIS OF LEFT KNEE: ICD-10-CM

## 2023-02-21 NOTE — PROGRESS NOTES
Orthopedics          Skyler De Leon 72 y o  female MRN: [id number]      Chief Complaint:   bilateral knee pain    HPI:   72 y  o female complaining of bilateral knee pain  Patient presents office today regarding bilateral knee pain due to osteoarthritis patient presents today for bilateral knee viscosupplementation injections states the pain is worse with activity worse with weightbearing mildly relieved with rest   Denies any change in numbness in bilateral lower extremities  Patient states her left knee is worse than right she ambulates with the assistance of a walker and a motorized scooter at home                    Review Of Systems:   · Skin: Normal  · Neuro: See HPI  · Musculoskeletal: See HPI  · All other systems reviewed and are negative    Past Medical History:   Past Medical History:   Diagnosis Date   • Arthritis    • Hernia, umbilical    • Hypertension    • Spinal stenosis        Past Surgical History:   Past Surgical History:   Procedure Laterality Date   • BREAST CYST EXCISION Left 1998   • FOOT SURGERY      Toe   • HERNIA REPAIR     • HYSTERECTOMY     • MAMMO (HISTORICAL)  2017   • TONSILLECTOMY         Family History:  Family history reviewed and non-contributory  Family History   Problem Relation Age of Onset   • Hypertension Mother    • Cancer Father    • Colon cancer Father 62   • No Known Problems Sister    • Breast cancer Maternal Grandmother         not sure of her age of onset   • No Known Problems Sister    • No Known Problems Maternal Aunt    • No Known Problems Maternal Aunt    • No Known Problems Maternal Aunt    • No Known Problems Maternal Aunt    • No Known Problems Paternal Aunt    • No Known Problems Paternal Aunt          Social History:  Social History     Socioeconomic History   • Marital status: /Civil Union     Spouse name: None   • Number of children: None   • Years of education: None   • Highest education level: None   Occupational History   • Occupation: Retired    Tobacco Use   • Smoking status: Never   • Smokeless tobacco: Never   Vaping Use   • Vaping Use: Never used   Substance and Sexual Activity   • Alcohol use: No   • Drug use: No   • Sexual activity: Yes     Partners: Male     Birth control/protection: Post-menopausal   Other Topics Concern   • None   Social History Narrative    · Most recent tobacco use screenin2019      · Occupation:   Retired - [place]      · Caffeine intake: Moderate  Daily Tea (at least one cup)     · Illicit drugs:   None      · Guns present in home:   No      · Seat belts used routinely:   Yes      · Sunscreen used routinely:   Yes      · Smoke alarm in home: Yes      · Advance directive:   Unsure     · Salt Intake:   Very Little      · Has the Patient had a mammogram to screen for breast cancer within 24 months:   Yes      Social Determinants of Health     Financial Resource Strain: Not on file   Food Insecurity: Not on file   Transportation Needs: Not on file   Physical Activity: Not on file   Stress: Not on file   Social Connections: Not on file   Intimate Partner Violence: Not on file   Housing Stability: Not on file       Allergies:    Allergies   Allergen Reactions   • Aspirin    • Ibuprofen        Labs:  0   Lab Value Date/Time    HCT 43 8 2022 1214    HCT 39 9 2022 1110    HCT 46 1 2021 1013    HGB 13 7 2022 1214    HGB 12 8 2022 1110    HGB 14 0 2021 1013    WBC 4 00 (L) 2022 1214    WBC 4 50 2022 1110    WBC 4 28 (L) 2021 1013    ESR 36 (H) 2021 1028    CRP <3 0 2022 1110       Meds:    Current Outpatient Medications:   •  baclofen 20 mg tablet, TAKE 1 TABLET BY MOUTH FOUR TIMES DAILY, Disp: 120 tablet, Rfl: 6  •  Calcium Carbonate-Vitamin D 600-200 MG-UNIT TABS, Take by mouth  , Disp: , Rfl:   •  celecoxib (CeleBREX) 200 mg capsule, TAKE 1 CAPSULE(200 MG) BY MOUTH DAILY, Disp: 30 capsule, Rfl: 1  •  Cholecalciferol (VITAMIN D PO), Take 5,000 Units by mouth, Disp: , Rfl:   •  denosumab (PROLIA) 60 mg/mL, Inject under the skin, Disp: , Rfl:   •  diphenoxylate-atropine (LOMOTIL) 2 5-0 025 mg per tablet, Take 1 tablet by mouth 4 (four) times a day as needed for diarrhea, Disp: 40 tablet, Rfl: 1  •  famotidine (PEPCID) 20 mg tablet, Take 1 tablet (20 mg total) by mouth 2 (two) times a day, Disp: 100 tablet, Rfl: 3  •  Ferrous Sulfate (IRON PO), Take 65 mg by mouth daily, Disp: , Rfl:   •  levothyroxine 50 mcg tablet, TAKE 1 TABLET BY MOUTH EVERY DAY AS DIRECTED, Disp: 90 tablet, Rfl: 3  •  potassium chloride (MICRO-K) 10 MEQ CR capsule, TAKE 2 CAPSULES BY MOUTH TWICE DAILY AS DIRECTED, Disp: 360 capsule, Rfl: 3  •  rosuvastatin (CRESTOR) 20 MG tablet, TAKE 1 TABLET BY MOUTH EVERY DAY AS DIRECTED, Disp: 90 tablet, Rfl: 3  •  telmisartan (MICARDIS) 80 MG tablet, TAKE 1 TABLET(80 MG) BY MOUTH DAILY, Disp: 30 tablet, Rfl: 6  •  zolpidem (AMBIEN) 5 mg tablet, Take by mouth, Disp: , Rfl:   •  benzonatate (TESSALON PERLES) 100 mg capsule, Take 1 capsule (100 mg total) by mouth 3 (three) times a day as needed for cough (Patient not taking: Reported on 12/9/2022), Disp: 20 capsule, Rfl: 0  •  Multiple Vitamins-Minerals (CENTRUM SILVER 50+WOMEN) TABS, Take by mouth   (Patient not taking: Reported on 2/9/2023), Disp: , Rfl:       Physical Exam:   Vitals:    02/21/23 1052   BP: 145/87   Pulse: 79       General Appearance:    Alert, cooperative, no distress, appears stated age   Head:    Normocephalic, without obvious abnormality, atraumatic   Eyes:    conjunctiva/corneas clear, both eyes        Nose:   Nares normal, septum midline, no drainage    Throat:   Lips normal; teeth and gums normal   Neck:    symmetrical, trachea midline, ;     thyroid:  no enlargement/   Back:     Symmetric, no curvature, ROM normal   Lungs:   No audible wheezing or labored breathing   Chest Wall:    No tenderness or deformity    Heart:    Regular rate and rhythm               Pulses:   2+ and symmetric all extremities   Skin:   Skin color, texture, turgor normal, no rashes or lesions   Neurologic:   normal strength, sensation and reflexes     throughout       Musculoskeletal: bilateral lower extremity  · On examination of the right knee there is no effusion, no erythema  Range of motion to full active extension and flexion to greater than 120°  Pain on palpation medial and lateral joint lines  There is crepitus with range of motion, no warmth to palpation, bony enlargement noted  No pain on palpation pes anserine bursa region or distal iliotibial band  Stable to varus and valgus stress without pain or gapping  Negative anterior and posterior drawer testing  Sensation intact distal pulses present  · On examination of the left knee there is no effusion, no erythema  Range of motion to full active extension and flexion to greater than 120°  Pain on palpation medial and lateral joint lines  There is crepitus with range of motion, no warmth to palpation, bony enlargement noted  No pain on palpation pes anserine bursa region or distal iliotibial band  Stable to varus and valgus stress without pain or gapping  Negative anterior and posterior drawer testing  Sensation intact distal pulses present      Large joint arthrocentesis: R knee  Universal Protocol:  Consent given by: patient  Patient understanding: patient states understanding of the procedure being performed  Site marked: the operative site was marked  Patient identity confirmed: verbally with patient    Supporting Documentation  Indications: pain   Procedure Details  Location: knee - R knee  Needle size: 22 G  Approach: superior  Medications administered: 3 mL sodium hyaluronate 60 MG/3ML      Large joint arthrocentesis: L knee  Universal Protocol:  Consent given by: patient  Patient understanding: patient states understanding of the procedure being performed  Site marked: the operative site was marked  Patient identity confirmed: verbally with patient    Supporting Documentation  Indications: pain   Procedure Details  Location: knee - L knee  Needle size: 22 G  Approach: superior  Medications administered: 3 mL sodium hyaluronate 60 MG/3ML    Patient tolerance: patient tolerated the procedure well with no immediate complications          _*_*_*_*_*_*_*_*_*_*_*_*_*_*_*_*_*_*_*_*_*_*_*_*_*_*_*_*_*_*_*_*_*_*_*_*_*_*_*_*_*    Assessment:  72 y  o female with bilateral knee pain due to osteoarthritis    Plan:   · Weight bearing as tolerated  bilateral lower extremity  · Bilateral knee intra-articular viscosupplementation injections in the form of Durolane administered as noted above  · Advised no significant activity or increased ambulation over the next 24-48 hours and warm compresses to the knee no greater 20 minutes 3-4 times 24 hours following the injection  Patient advised should they develop any increasing pain, redness, drainage, numbness, tingling or swelling surrounding the injection sight, they are to contact our office or present to the emergency department    · Case discussed with Dr Alfonso López  · Follow up in 3 months or sooner if needed      Meagan Velasquez PA-C

## 2023-03-07 DIAGNOSIS — M19.90 ARTHRITIS: ICD-10-CM

## 2023-03-07 DIAGNOSIS — M62.838 MUSCLE SPASM OF BOTH LOWER LEGS: ICD-10-CM

## 2023-03-07 RX ORDER — BACLOFEN 20 MG/1
TABLET ORAL
Qty: 120 TABLET | Refills: 6 | Status: SHIPPED | OUTPATIENT
Start: 2023-03-07

## 2023-03-07 RX ORDER — CELECOXIB 200 MG/1
CAPSULE ORAL
Qty: 30 CAPSULE | Refills: 1 | Status: SHIPPED | OUTPATIENT
Start: 2023-03-07

## 2023-03-20 ENCOUNTER — OFFICE VISIT (OUTPATIENT)
Dept: FAMILY MEDICINE CLINIC | Facility: CLINIC | Age: 73
End: 2023-03-20

## 2023-03-20 VITALS
HEIGHT: 57 IN | BODY MASS INDEX: 27.18 KG/M2 | SYSTOLIC BLOOD PRESSURE: 148 MMHG | DIASTOLIC BLOOD PRESSURE: 90 MMHG | OXYGEN SATURATION: 98 % | HEART RATE: 60 BPM | WEIGHT: 126 LBS | TEMPERATURE: 97.7 F

## 2023-03-20 DIAGNOSIS — M35.3 POLYMYALGIA RHEUMATICA (HCC): ICD-10-CM

## 2023-03-20 DIAGNOSIS — J40 BRONCHITIS: Primary | ICD-10-CM

## 2023-03-20 DIAGNOSIS — D47.3 ESSENTIAL (HEMORRHAGIC) THROMBOCYTHEMIA (HCC): ICD-10-CM

## 2023-03-20 DIAGNOSIS — E21.3 HYPERPARATHYROIDISM (HCC): ICD-10-CM

## 2023-03-20 RX ORDER — DOXYCYCLINE HYCLATE 100 MG
100 TABLET ORAL 2 TIMES DAILY
Qty: 20 TABLET | Refills: 0 | Status: SHIPPED | OUTPATIENT
Start: 2023-03-20 | End: 2023-03-31 | Stop reason: SDUPTHER

## 2023-03-20 NOTE — PROGRESS NOTES
Assessment/Plan:  Deep chest cough, green - yellow mocus  No fever chills or night sweats  No headache nausea or vomiting  Problems seem to be just deep in the chest with distress on coughing and distress with seeing thick yellow-green mucus  This appears to be bronchitis that is not resolving with prior therapeutic regime  1  Bronchitis  -     doxycycline hyclate (VIBRA-TABS) 100 mg tablet; Take 1 tablet (100 mg total) by mouth 2 (two) times a day for 10 days    2  Hyperparathyroidism ([address] )    3  Polymyalgia rheumatica ([facility] [address] )    4  Essential (hemorrhagic) thrombocythemia ([facility] [address] )          Subjective:      Patient ID: Nicky Mata is a 72 y o  female  HPI    The following portions of the patient's history were reviewed and updated as appropriate: She  has a past medical history of Anemia, Arthritis, GERD (gastroesophageal reflux disease), Hernia, umbilical, Hypertension, and Spinal stenosis  She   Patient Active Problem List    Diagnosis Date Noted   • Hyperparathyroidism ([facility] [address] ) 03/20/2023   • Irritable bowel syndrome with diarrhea 03/15/2021   • Increased BMI 03/15/2021   • Polymyalgia rheumatica ([address] ) 07/30/2020   • Arthritis    • Hernia, umbilical    • Spinal stenosis 04/20/2020   • Hypertension 04/20/2020   • Iron deficiency anemia, unspecified 01/08/2019   • Essential (hemorrhagic) thrombocythemia ([facility] [address] ) 08/07/2018   • Anemia 08/07/2018     She  has a past surgical history that includes Hysterectomy; Foot surgery; Hernia repair; Tonsillectomy; Mammo (historical) (2017); and Breast cyst excision (Left, 1998)  Her family history includes Breast cancer in her maternal grandmother; Cancer in her father; Colon cancer (age of onset: 62) in her father; Hypertension in her mother; No Known Problems in her maternal aunt, maternal aunt, maternal aunt, maternal aunt, paternal aunt, paternal aunt, sister, and sister  She  reports that she has never smoked   She has never used smokeless tobacco  She reports that she does not drink alcohol and does not use drugs  Current Outpatient Medications   Medication Sig Dispense Refill   • baclofen 20 mg tablet TAKE 1 TABLET BY MOUTH FOUR TIMES DAILY 120 tablet 6   • Calcium Carbonate-Vitamin D 600-200 MG-UNIT TABS Take by mouth       • celecoxib (CeleBREX) 200 mg capsule TAKE 1 CAPSULE(200 MG) BY MOUTH DAILY 30 capsule 1   • Cholecalciferol (VITAMIN D PO) Take 5,000 Units by mouth     • denosumab (PROLIA) 60 mg/mL Inject under the skin     • diphenoxylate-atropine (LOMOTIL) 2 5-0 025 mg per tablet Take 1 tablet by mouth 4 (four) times a day as needed for diarrhea 40 tablet 1   • doxycycline hyclate (VIBRA-TABS) 100 mg tablet Take 1 tablet (100 mg total) by mouth 2 (two) times a day for 10 days 20 tablet 0   • famotidine (PEPCID) 20 mg tablet Take 1 tablet (20 mg total) by mouth 2 (two) times a day 100 tablet 3   • Ferrous Sulfate (IRON PO) Take 65 mg by mouth daily     • levothyroxine 50 mcg tablet TAKE 1 TABLET BY MOUTH EVERY DAY AS DIRECTED 90 tablet 3   • potassium chloride (MICRO-K) 10 MEQ CR capsule TAKE 2 CAPSULES BY MOUTH TWICE DAILY AS DIRECTED 360 capsule 3   • rosuvastatin (CRESTOR) 20 MG tablet TAKE 1 TABLET BY MOUTH EVERY DAY AS DIRECTED 90 tablet 3   • telmisartan (MICARDIS) 80 MG tablet TAKE 1 TABLET(80 MG) BY MOUTH DAILY 30 tablet 6   • zolpidem (AMBIEN) 5 mg tablet Take by mouth     • benzonatate (TESSALON PERLES) 100 mg capsule Take 1 capsule (100 mg total) by mouth 3 (three) times a day as needed for cough (Patient not taking: Reported on 12/9/2022) 20 capsule 0   • Multiple Vitamins-Minerals (CENTRUM SILVER 50+WOMEN) TABS Take by mouth   (Patient not taking: Reported on 2/9/2023)       No current facility-administered medications for this visit       Current Outpatient Medications on File Prior to Visit   Medication Sig   • baclofen 20 mg tablet TAKE 1 TABLET BY MOUTH FOUR TIMES DAILY   • Calcium Carbonate-Vitamin D 600-200 MG-UNIT TABS Take by mouth     • celecoxib (CeleBREX) 200 mg capsule TAKE 1 CAPSULE(200 MG) BY MOUTH DAILY   • Cholecalciferol (VITAMIN D PO) Take 5,000 Units by mouth   • denosumab (PROLIA) 60 mg/mL Inject under the skin   • diphenoxylate-atropine (LOMOTIL) 2 5-0 025 mg per tablet Take 1 tablet by mouth 4 (four) times a day as needed for diarrhea   • famotidine (PEPCID) 20 mg tablet Take 1 tablet (20 mg total) by mouth 2 (two) times a day   • Ferrous Sulfate (IRON PO) Take 65 mg by mouth daily   • levothyroxine 50 mcg tablet TAKE 1 TABLET BY MOUTH EVERY DAY AS DIRECTED   • potassium chloride (MICRO-K) 10 MEQ CR capsule TAKE 2 CAPSULES BY MOUTH TWICE DAILY AS DIRECTED   • rosuvastatin (CRESTOR) 20 MG tablet TAKE 1 TABLET BY MOUTH EVERY DAY AS DIRECTED   • telmisartan (MICARDIS) 80 MG tablet TAKE 1 TABLET(80 MG) BY MOUTH DAILY   • zolpidem (AMBIEN) 5 mg tablet Take by mouth   • benzonatate (TESSALON PERLES) 100 mg capsule Take 1 capsule (100 mg total) by mouth 3 (three) times a day as needed for cough (Patient not taking: Reported on 12/9/2022)   • Multiple Vitamins-Minerals (CENTRUM SILVER 50+WOMEN) TABS Take by mouth   (Patient not taking: Reported on 2/9/2023)     No current facility-administered medications on file prior to visit  She is allergic to aspirin and ibuprofen       Review of Systems   Constitutional: Negative for activity change (in WC), appetite change, chills, diaphoresis, fatigue and fever  HENT: Negative for congestion, ear discharge, ear pain, facial swelling, nosebleeds, sore throat, tinnitus, trouble swallowing and voice change  Eyes: Negative for photophobia, pain, discharge, redness, itching and visual disturbance  Respiratory: Positive for cough  Negative for apnea, choking, chest tightness and shortness of breath  Cardiovascular: Negative for chest pain, palpitations and leg swelling          Denies any and all cardiac symptoms   Gastrointestinal: Negative for abdominal distention, abdominal pain, blood in stool, constipation, diarrhea, nausea and vomiting  Endocrine: Negative for cold intolerance, heat intolerance, polydipsia, polyphagia and polyuria  Genitourinary: Negative for decreased urine volume, difficulty urinating, dysuria, enuresis, frequency, hematuria, pelvic pain, urgency and vaginal bleeding  Musculoskeletal: Positive for arthralgias, back pain, gait problem and myalgias  Negative for joint swelling, neck pain and neck stiffness  Severe scoliosis with kyphosis throughout spine especially lumbar spine  History of PMR on prednisone  Sees Dr Onofre  q6 mo and he is rx'ing the arthritis   Skin: Positive for pallor  Negative for color change and rash  Allergic/Immunologic: Negative for immunocompromised state  Neurological: Negative for dizziness, seizures, facial asymmetry, light-headedness, numbness and headaches  Hematological: Negative for adenopathy  Psychiatric/Behavioral: Negative for agitation, behavioral problems, confusion, decreased concentration, dysphoric mood and hallucinations  Objective:      /90 (BP Location: Left arm, Patient Position: Sitting, Cuff Size: Standard)   Pulse 60   Temp 97 7 °F (36 5 °C) (Temporal)   Ht 4' 9" (1 448 m)   Wt 57 2 kg (126 lb)   SpO2 98%   BMI 27 27 kg/m²          Physical Exam  Vitals and nursing note reviewed  Constitutional:       General: She is not in acute distress  Appearance: Normal appearance  She is well-developed  She is obese  She is not ill-appearing, toxic-appearing or diaphoretic  HENT:      Head: Normocephalic and atraumatic  Nose: Nose normal    Eyes:      General: No scleral icterus  Right eye: No discharge  Left eye: No discharge  Conjunctiva/sclera: Conjunctivae normal       Pupils: Pupils are equal, round, and reactive to light  Neck:      Thyroid: No thyromegaly  Trachea: No tracheal deviation     Cardiovascular:      Rate and Rhythm: Normal rate and regular rhythm  Heart sounds: Normal heart sounds  Pulmonary:      Effort: No respiratory distress  Breath sounds: Normal breath sounds  No wheezing or rales  Chest:      Chest wall: No tenderness  Abdominal:      Palpations: Abdomen is soft  Musculoskeletal:         General: No tenderness or deformity  Normal range of motion  Cervical back: Normal range of motion and neck supple  Right lower leg: No edema  Left lower leg: No edema  Comments: Patient with many many years severe kyphoscoliosis lumbar spine  Cannot ambulate without walker and review of rheumatologist notes suggest impending surgery needed  Skin:     General: Skin is warm and dry  Coloration: Skin is not pale  Findings: No erythema or rash  Neurological:      General: No focal deficit present  Mental Status: She is alert and oriented to person, place, and time  Cranial Nerves: No cranial nerve deficit  Gait: Gait abnormal       Deep Tendon Reflexes: Reflexes abnormal    Psychiatric:         Mood and Affect: Mood normal          Behavior: Behavior normal          Thought Content: Thought content normal          Judgment: Judgment normal          20 minutes with patient and  evaluating and treating above  This time was spent reviewing previous records, reviewing previous laboratory and other tests, taking history from patient, examination of patient, discussion of prognosis and treatment, ordering laboratory tests, ordering medications, and completion of the medical record

## 2023-03-30 ENCOUNTER — TELEPHONE (OUTPATIENT)
Dept: FAMILY MEDICINE CLINIC | Facility: CLINIC | Age: 73
End: 2023-03-30

## 2023-03-30 NOTE — TELEPHONE ENCOUNTER
Patient called stating you saw her 3/20 for bronchitis and prescribed her doxycyline and is on her last pill  Patient would like to know if she can get a refill because she is still coughing and coughing up mucus

## 2023-03-31 ENCOUNTER — TELEPHONE (OUTPATIENT)
Dept: FAMILY MEDICINE CLINIC | Facility: CLINIC | Age: 73
End: 2023-03-31

## 2023-03-31 DIAGNOSIS — J40 BRONCHITIS: ICD-10-CM

## 2023-03-31 RX ORDER — DOXYCYCLINE HYCLATE 100 MG
100 TABLET ORAL 2 TIMES DAILY
Qty: 10 TABLET | Refills: 0 | Status: SHIPPED | OUTPATIENT
Start: 2023-03-31 | End: 2023-04-05

## 2023-03-31 NOTE — TELEPHONE ENCOUNTER
Yes  My name is Kimber Pena  My YOB: 1950  I am doctor Slade Alfonso, patient  I called yesterday morning to say that I took my last antibiotic yesterday morning and that I'm still coughing and still sneezing  I am better, but I'm not 100% yet and I wanted to know if he wanted to continue the antibiotic or what he wanted me to do at this point  Whoever answered the phone yesterday said they would get back to me shortly  And no one ever called me back and there is no prescription at the pharmacy for me  So I'm not sure what I'm supposed to be doing at this point  My phone number is [phone]  Thank you

## 2023-04-20 ENCOUNTER — APPOINTMENT (OUTPATIENT)
Dept: LAB | Facility: CLINIC | Age: 73
End: 2023-04-20

## 2023-05-17 ENCOUNTER — RA CDI HCC (OUTPATIENT)
Dept: OTHER | Facility: HOSPITAL | Age: 73
End: 2023-05-17

## 2023-05-17 NOTE — PROGRESS NOTES
[address]  coding opportunities       Chart reviewed, no opportunity found: CHART REVIEWED, NO OPPORTUNITY FOUND        Patients Insurance     Medicare Insurance: Medicare

## 2023-05-24 ENCOUNTER — OFFICE VISIT (OUTPATIENT)
Dept: FAMILY MEDICINE CLINIC | Facility: CLINIC | Age: 73
End: 2023-05-24

## 2023-05-24 VITALS
SYSTOLIC BLOOD PRESSURE: 152 MMHG | HEART RATE: 48 BPM | DIASTOLIC BLOOD PRESSURE: 84 MMHG | TEMPERATURE: 97.5 F | WEIGHT: 127 LBS | HEIGHT: 57 IN | BODY MASS INDEX: 27.4 KG/M2 | OXYGEN SATURATION: 99 %

## 2023-05-24 DIAGNOSIS — K58.0 IRRITABLE BOWEL SYNDROME WITH DIARRHEA: ICD-10-CM

## 2023-05-24 DIAGNOSIS — Z71.2 ENCOUNTER TO DISCUSS TEST RESULTS: ICD-10-CM

## 2023-05-24 DIAGNOSIS — Z79.899 ENCOUNTER FOR LONG-TERM (CURRENT) USE OF MEDICATIONS: ICD-10-CM

## 2023-05-24 DIAGNOSIS — L28.0 NEURODERMATITIS, LOCALIZED: ICD-10-CM

## 2023-05-24 DIAGNOSIS — Z00.00 MEDICARE ANNUAL WELLNESS VISIT, SUBSEQUENT: Primary | ICD-10-CM

## 2023-05-24 DIAGNOSIS — Z23 NEED FOR VACCINATION: ICD-10-CM

## 2023-05-24 DIAGNOSIS — I10 ESSENTIAL HYPERTENSION: ICD-10-CM

## 2023-05-24 RX ORDER — HYDROCHLOROTHIAZIDE 12.5 MG/1
12.5 TABLET ORAL DAILY
Qty: 90 TABLET | Refills: 3 | Status: SHIPPED | OUTPATIENT
Start: 2023-05-24

## 2023-05-24 RX ORDER — DIPHENOXYLATE HYDROCHLORIDE AND ATROPINE SULFATE 2.5; .025 MG/1; MG/1
1 TABLET ORAL 4 TIMES DAILY PRN
Qty: 40 TABLET | Refills: 1 | Status: SHIPPED | OUTPATIENT
Start: 2023-05-24

## 2023-05-24 RX ORDER — TRIAMCINOLONE ACETONIDE 1 MG/G
CREAM TOPICAL 2 TIMES DAILY
Qty: 45 G | Refills: 1 | Status: SHIPPED | OUTPATIENT
Start: 2023-05-24

## 2023-05-24 NOTE — PATIENT INSTRUCTIONS
Medicare Preventive Visit Patient Instructions  Thank you for completing your Welcome to Medicare Visit or Medicare Annual Wellness Visit today  Your next wellness visit will be due in one year (5/24/2024)  The screening/preventive services that you may require over the next 5-10 years are detailed below  Some tests may not apply to you based off risk factors and/or age  Screening tests ordered at today's visit but not completed yet may show as past due  Also, please note that scanned in results may not display below  Preventive Screenings:  Service Recommendations Previous Testing/Comments   Colorectal Cancer Screening  * Colonoscopy    * Fecal Occult Blood Test (FOBT)/Fecal Immunochemical Test (FIT)  * Fecal DNA/Cologuard Test  * Flexible Sigmoidoscopy Age: 39-70 years old   Colonoscopy: every 10 years (may be performed more frequently if at higher risk)  OR  FOBT/FIT: every 1 year  OR  Cologuard: every 3 years  OR  Sigmoidoscopy: every 5 years  Screening may be recommended earlier than age 39 if at higher risk for colorectal cancer  Also, an individualized decision between you and your healthcare provider will decide whether screening between the ages of 74-80 would be appropriate  Colonoscopy: 05/14/2019  FOBT/FIT: Not on file  Cologuard: Not on file  Sigmoidoscopy: Not on file    Screening Current     Breast Cancer Screening Age: 36 years old  Frequency: every 1-2 years  Not required if history of left and right mastectomy Mammogram: 05/17/2021        Cervical Cancer Screening Between the ages of 21-29, pap smear recommended once every 3 years  Between the ages of 33-67, can perform pap smear with HPV co-testing every 5 years     Recommendations may differ for women with a history of total hysterectomy, cervical cancer, or abnormal pap smears in past  Pap Smear: 05/03/2021    Screening Not Indicated   Hepatitis C Screening Once for adults born between 1945 and 1965  More frequently in patients at high risk for Hepatitis C Hep C Antibody: 10/16/2018    Screening Current   Diabetes Screening 1-2 times per year if you're at risk for diabetes or have pre-diabetes Fasting glucose: 93 mg/dL (10/20/2022)  A1C: 5 5 % (4/20/2023)  Screening Current   Cholesterol Screening Once every 5 years if you don't have a lipid disorder  May order more often based on risk factors  Lipid panel: 04/13/2022    Screening Not Indicated  History Lipid Disorder     Other Preventive Screenings Covered by Medicare:  1  Abdominal Aortic Aneurysm (AAA) Screening: covered once if your at risk  You're considered to be at risk if you have a family history of AAA  2  Lung Cancer Screening: covers low dose CT scan once per year if you meet all of the following conditions: (1) Age 50-69; (2) No signs or symptoms of lung cancer; (3) Current smoker or have quit smoking within the last 15 years; (4) You have a tobacco smoking history of at least 20 pack years (packs per day multiplied by number of years you smoked); (5) You get a written order from a healthcare provider  3  Glaucoma Screening: covered annually if you're considered high risk: (1) You have diabetes OR (2) Family history of glaucoma OR (3)  aged 48 and older OR (3)  American aged 72 and older  3  Osteoporosis Screening: covered every 2 years if you meet one of the following conditions: (1) You're estrogen deficient and at risk for osteoporosis based off medical history and other findings; (2) Have a vertebral abnormality; (3) On glucocorticoid therapy for more than 3 months; (4) Have primary hyperparathyroidism; (5) On osteoporosis medications and need to assess response to drug therapy  · Last bone density test (DXA Scan): 05/17/2021   5  HIV Screening: covered annually if you're between the age of 15-65  Also covered annually if you are younger than 13 and older than 72 with risk factors for HIV infection   For pregnant patients, it is covered up to 3 times per pregnancy  Immunizations:  Immunization Recommendations   Influenza Vaccine Annual influenza vaccination during flu season is recommended for all persons aged >= 6 months who do not have contraindications   Pneumococcal Vaccine   * Pneumococcal conjugate vaccine = PCV13 (Prevnar 13), PCV15 (Vaxneuvance), PCV20 (Prevnar 20)  * Pneumococcal polysaccharide vaccine = PPSV23 (Pneumovax) Adults 25-60 years old: 1-3 doses may be recommended based on certain risk factors  Adults 72 years old: 1-2 doses may be recommended based off what pneumonia vaccine you previously received   Hepatitis B Vaccine 3 dose series if at intermediate or high risk (ex: diabetes, end stage renal disease, liver disease)   Tetanus (Td) Vaccine - COST NOT COVERED BY MEDICARE PART B Following completion of primary series, a booster dose should be given every 10 years to maintain immunity against tetanus  Td may also be given as tetanus wound prophylaxis  Tdap Vaccine - COST NOT COVERED BY MEDICARE PART B Recommended at least once for all adults  For pregnant patients, recommended with each pregnancy  Shingles Vaccine (Shingrix) - COST NOT COVERED BY MEDICARE PART B  2 shot series recommended in those aged 48 and above     Health Maintenance Due:      Topic Date Due   • Breast Cancer Screening: Mammogram  05/17/2022   • Colorectal Cancer Screening  05/14/2029   • Hepatitis C Screening  Completed     Immunizations Due:      Topic Date Due   • COVID-19 Vaccine (2 - Booster for Maura series) 05/27/2021   • Pneumococcal Vaccine: 65+ Years (2 - PCV) 03/21/2023     Advance Directives   What are advance directives? Advance directives are legal documents that state your wishes and plans for medical care  These plans are made ahead of time in case you lose your ability to make decisions for yourself  Advance directives can apply to any medical decision, such as the treatments you want, and if you want to donate organs     What are the types of advance directives? There are many types of advance directives, and each state has rules about how to use them  You may choose a combination of any of the following:  · Living will: This is a written record of the treatment you want  You can also choose which treatments you do not want, which to limit, and which to stop at a certain time  This includes surgery, medicine, IV fluid, and tube feedings  · Durable power of  for healthcare [facility]): This is a written record that states who you want to make healthcare choices for you when you are unable to make them for yourself  This person, called a proxy, is usually a family member or a friend  You may choose more than 1 proxy  · Do not resuscitate (DNR) order:  A DNR order is used in case your heart stops beating or you stop breathing  It is a request not to have certain forms of treatment, such as CPR  A DNR order may be included in other types of advance directives  · Medical directive: This covers the care that you want if you are in a coma, near death, or unable to make decisions for yourself  You can list the treatments you want for each condition  Treatment may include pain medicine, surgery, blood transfusions, dialysis, IV or tube feedings, and a ventilator (breathing machine)  · Values history: This document has questions about your views, beliefs, and how you feel and think about life  This information can help others choose the care that you would choose  Why are advance directives important? An advance directive helps you control your care  Although spoken wishes may be used, it is better to have your wishes written down  Spoken wishes can be misunderstood, or not followed  Treatments may be given even if you do not want them  An advance directive may make it easier for your family to make difficult choices about your care     Weight Management   Why it is important to manage your weight:  Being overweight increases your risk of health conditions such as heart disease, high blood pressure, type 2 diabetes, and certain types of cancer  It can also increase your risk for osteoarthritis, sleep apnea, and other respiratory problems  Aim for a slow, steady weight loss  Even a small amount of weight loss can lower your risk of health problems  How to lose weight safely:  A safe and healthy way to lose weight is to eat fewer calories and get regular exercise  You can lose up about 1 pound a week by decreasing the number of calories you eat by 500 calories each day  Healthy meal plan for weight management:  A healthy meal plan includes a variety of foods, contains fewer calories, and helps you stay healthy  A healthy meal plan includes the following:  · Eat whole-grain foods more often  A healthy meal plan should contain fiber  Fiber is the part of grains, fruits, and vegetables that is not broken down by your body  Whole-grain foods are healthy and provide extra fiber in your diet  Some examples of whole-grain foods are whole-wheat breads and pastas, oatmeal, brown rice, and bulgur  · Eat a variety of vegetables every day  Include dark, leafy greens such as spinach, kale, juan antonio greens, and mustard greens  Eat yellow and orange vegetables such as carrots, sweet potatoes, and winter squash  · Eat a variety of fruits every day  Choose fresh or canned fruit (canned in its own juice or light syrup) instead of juice  Fruit juice has very little or no fiber  · Eat low-fat dairy foods  Drink fat-free (skim) milk or 1% milk  Eat fat-free yogurt and low-fat cottage cheese  Try low-fat cheeses such as mozzarella and other reduced-fat cheeses  · Choose meat and other protein foods that are low in fat  Choose beans or other legumes such as split peas or lentils  Choose fish, skinless poultry (chicken or turkey), or lean cuts of red meat (beef or pork)  Before you cook meat or poultry, cut off any visible fat  · Use less fat and oil    Try baking foods instead of frying them  Add less fat, such as margarine, sour cream, regular salad dressing and mayonnaise to foods  Eat fewer high-fat foods  Some examples of high-fat foods include french fries, doughnuts, ice cream, and cakes  · Eat fewer sweets  Limit foods and drinks that are high in sugar  This includes candy, cookies, regular soda, and sweetened drinks  Exercise:  Exercise at least 30 minutes per day on most days of the week  Some examples of exercise include walking, biking, dancing, and swimming  You can also fit in more physical activity by taking the stairs instead of the elevator or parking farther away from stores  Ask your healthcare provider about the best exercise plan for you  © Copyright [employer] 2018 Information is for End User's use only and may not be sold, redistributed or otherwise used for commercial purposes   All illustrations and images included in CareNotes® are the copyrighted property of A D A M , Inc  or [address] [place] [address]

## 2023-05-24 NOTE — PROGRESS NOTES
Name: Osito Stokes      : 1950      MRN: [id number]  Encounter Provider: Bakari Helms DO  Encounter Date: 2023   Encounter department: [address] [place] [address]     1  Medicare annual wellness visit, subsequent    2  Irritable bowel syndrome with diarrhea  -     diphenoxylate-atropine (LOMOTIL) 2 5-0 025 mg per tablet; Take 1 tablet by mouth 4 (four) times a day as needed for diarrhea    3  Encounter to discuss test results    4  Encounter for long-term (current) use of medications    5  Essential hypertension  Comments:  Blood pressure uncontrolled at 158/94  RESUME  HCTZ 12 5 mg OD        Subjective      HPI -- 73 yo female, seeing me and DR Noel Eisenberg, for her arthritis issues and meds     PDMP reviewed too  Needs refill  Of Lomotil     Review of Systems    Current Outpatient Medications on File Prior to Visit   Medication Sig   • baclofen 20 mg tablet TAKE 1 TABLET BY MOUTH FOUR TIMES DAILY   • Calcium Carbonate-Vitamin D 600-200 MG-UNIT TABS Take by mouth     • celecoxib (CeleBREX) 200 mg capsule TAKE 1 CAPSULE(200 MG) BY MOUTH DAILY   • Cholecalciferol (VITAMIN D PO) Take 5,000 Units by mouth   • denosumab (PROLIA) 60 mg/mL Inject under the skin   • famotidine (PEPCID) 20 mg tablet Take 1 tablet (20 mg total) by mouth 2 (two) times a day   • Ferrous Sulfate (IRON PO) Take 65 mg by mouth daily   • levothyroxine 50 mcg tablet TAKE 1 TABLET BY MOUTH EVERY DAY AS DIRECTED   • potassium chloride (MICRO-K) 10 MEQ CR capsule TAKE 2 CAPSULES BY MOUTH TWICE DAILY AS DIRECTED   • rosuvastatin (CRESTOR) 20 MG tablet TAKE 1 TABLET BY MOUTH EVERY DAY AS DIRECTED   • telmisartan (MICARDIS) 80 MG tablet TAKE 1 TABLET(80 MG) BY MOUTH DAILY   • zolpidem (AMBIEN) 5 mg tablet Take by mouth   • [DISCONTINUED] diphenoxylate-atropine (LOMOTIL) 2 5-0 025 mg per tablet Take 1 tablet by mouth 4 (four) times a day as needed for diarrhea   • Multiple Vitamins-Minerals (CENTRUM SILVER "50+WOMEN) TABS Take by mouth   (Patient not taking: Reported on 2/9/2023)   • [DISCONTINUED] benzonatate (TESSALON PERLES) 100 mg capsule Take 1 capsule (100 mg total) by mouth 3 (three) times a day as needed for cough (Patient not taking: Reported on 12/9/2022)       Objective     /84 (BP Location: Right arm, Patient Position: Sitting, Cuff Size: Standard)   Pulse (!) 48   Temp 97 5 °F (36 4 °C) (Temporal)   Ht 4' 9\" (1 448 m)   Wt 57 6 kg (127 lb)   SpO2 99%   BMI 27 48 kg/m²     Physical Exam         I personally reviewed the recent (and prior)  lab results, the image studies, pathology, other specialty/physicians consult notes and recommendations, and outside medical records from other institutions, as appropriate  I had a lengthy discussion with the patient and shared the work-up findings  We discussed the diagnosis and management plan  I spent  30  minutes reviewing the records (labs, clinician notes, outside records, medical history, ordering medicine/tests/procedures, interpreting the imaging/labs previously done) and coordination of care as well as direct time with the patient today, of which greater than 50% of the time was spent in counseling and coordination of care with the patient/family        Karrin Schaumann,   "

## 2023-05-24 NOTE — PROGRESS NOTES
Assessment and Plan:     Problem List Items Addressed This Visit    None      Depression Screening and Follow-up Plan: Patient was screened for depression during today's encounter  They screened negative with a PHQ-2 score of 0  Preventive health issues were discussed with patient, and age appropriate screening tests were ordered as noted in patient's After Visit Summary  Personalized health advice and appropriate referrals for health education or preventive services given if needed, as noted in patient's After Visit Summary       History of Present Illness:     Patient presents for a Medicare Wellness Visit    HPI   Patient Care Team:  Suzanna Benton DO as PCP - General (Family Medicine)  MD Jun Foley MD (Endocrinology)     Review of Systems:     Review of Systems     Problem List:     Patient Active Problem List   Diagnosis   • Essential (hemorrhagic) thrombocythemia ([facility] )   • Anemia   • Iron deficiency anemia, unspecified   • Spinal stenosis   • Hypertension   • Polymyalgia rheumatica ([facility] )   • Arthritis   • Hernia, umbilical   • Irritable bowel syndrome with diarrhea   • Increased BMI   • Hyperparathyroidism ([facility] )      Past Medical and Surgical History:     Past Medical History:   Diagnosis Date   • Anemia    • Arthritis    • GERD (gastroesophageal reflux disease)    • Hernia, umbilical    • Hypertension    • Spinal stenosis      Past Surgical History:   Procedure Laterality Date   • BREAST CYST EXCISION Left 1998   • FOOT SURGERY      Toe   • HERNIA REPAIR     • HYSTERECTOMY     • MAMMO (HISTORICAL)  2017   • TONSILLECTOMY        Family History:     Family History   Problem Relation Age of Onset   • Hypertension Mother    • Cancer Father    • Colon cancer Father 62   • No Known Problems Sister    • Breast cancer Maternal Grandmother         not sure of her age of onset   • No Known Problems Sister    • No Known Problems Maternal Aunt    • No Known Problems Maternal Aunt    • No Known Problems Maternal Aunt    • No Known Problems Maternal Aunt    • No Known Problems Paternal Aunt    • No Known Problems Paternal Aunt       Social History:     Social History     Socioeconomic History   • Marital status: /Civil Union     Spouse name: None   • Number of children: None   • Years of education: None   • Highest education level: None   Occupational History   • Occupation: Retired    Tobacco Use   • Smoking status: Never   • Smokeless tobacco: Never   Vaping Use   • Vaping Use: Never used   Substance and Sexual Activity   • Alcohol use: No   • Drug use: No   • Sexual activity: Yes     Partners: Male     Birth control/protection: Post-menopausal   Other Topics Concern   • None   Social History Narrative    · Most recent tobacco use screenin2019      · Occupation:   Retired - [employer]      · Caffeine intake: Moderate  Daily Tea (at least one cup)     · Illicit drugs:   None      · Guns present in home:   No      · Seat belts used routinely:   Yes      · Sunscreen used routinely:   Yes      · Smoke alarm in home:    Yes      · Advance directive:   Unsure     · Salt Intake:   Very Little      · Has the Patient had a mammogram to screen for breast cancer within 24 months:   Yes      Social Determinants of Health     Financial Resource Strain: Not on file   Food Insecurity: Not on file   Transportation Needs: Not on file   Physical Activity: Not on file   Stress: Not on file   Social Connections: Not on file   Intimate Partner Violence: Not on file   Housing Stability: Not on file      Medications and Allergies:     Current Outpatient Medications   Medication Sig Dispense Refill   • baclofen 20 mg tablet TAKE 1 TABLET BY MOUTH FOUR TIMES DAILY 120 tablet 6   • Calcium Carbonate-Vitamin D 600-200 MG-UNIT TABS Take by mouth       • celecoxib (CeleBREX) 200 mg capsule TAKE 1 CAPSULE(200 MG) BY MOUTH DAILY 30 capsule 1   • Cholecalciferol (VITAMIN D PO) Take 5,000 Units by mouth     • denosumab (PROLIA) 60 mg/mL Inject under the skin     • diphenoxylate-atropine (LOMOTIL) 2 5-0 025 mg per tablet Take 1 tablet by mouth 4 (four) times a day as needed for diarrhea 40 tablet 1   • famotidine (PEPCID) 20 mg tablet Take 1 tablet (20 mg total) by mouth 2 (two) times a day 100 tablet 3   • Ferrous Sulfate (IRON PO) Take 65 mg by mouth daily     • levothyroxine 50 mcg tablet TAKE 1 TABLET BY MOUTH EVERY DAY AS DIRECTED 90 tablet 3   • potassium chloride (MICRO-K) 10 MEQ CR capsule TAKE 2 CAPSULES BY MOUTH TWICE DAILY AS DIRECTED 360 capsule 3   • rosuvastatin (CRESTOR) 20 MG tablet TAKE 1 TABLET BY MOUTH EVERY DAY AS DIRECTED 90 tablet 3   • telmisartan (MICARDIS) 80 MG tablet TAKE 1 TABLET(80 MG) BY MOUTH DAILY 30 tablet 6   • zolpidem (AMBIEN) 5 mg tablet Take by mouth     • Multiple Vitamins-Minerals (CENTRUM SILVER 50+WOMEN) TABS Take by mouth   (Patient not taking: Reported on 2/9/2023)       No current facility-administered medications for this visit  Allergies   Allergen Reactions   • Aspirin    • Ibuprofen       Immunizations:     Immunization History   Administered Date(s) Administered   • COVID-19 J&J (Maura) vaccine 0 5 mL 04/01/2021   • INFLUENZA 11/03/2014, 10/26/2015, 10/27/2016, 10/31/2017, 10/16/2018   • Influenza, high dose seasonal 0 7 mL 11/02/2020, 11/15/2021, 11/03/2022   • Pneumococcal Polysaccharide PPV23 03/21/2022      Health Maintenance:         Topic Date Due   • Breast Cancer Screening: Mammogram  05/17/2022   • Colorectal Cancer Screening  05/14/2029   • Hepatitis C Screening  Completed         Topic Date Due   • COVID-19 Vaccine (2 - Booster for Maura series) 05/27/2021   • Pneumococcal Vaccine: 65+ Years (2 - PCV) 03/21/2023      Medicare Screening Tests and Risk Assessments:     Urmila Millan is here for her Subsequent Wellness visit  Last Medicare Wellness visit information reviewed, patient interviewed and updates made to the record today        Health Risk Assessment: Patient rates overall health as very good  Patient feels that their physical health rating is same  Patient is very satisfied with their life  Eyesight was rated as same  Hearing was rated as same  Patient feels that their emotional and mental health rating is same  Patients states they are never, rarely angry  Patient states they are never, rarely unusually tired/fatigued  Pain experienced in the last 7 days has been some  Patient's pain rating has been 9/10  Patient states that she has experienced no weight loss or gain in last 6 months  Depression Screening:   PHQ-2 Score: 0      Fall Risk Screening: In the past year, patient has experienced: no history of falling in past year      Urinary Incontinence Screening:   Patient has not leaked urine accidently in the last six months  Home Safety:  Patient has trouble with stairs inside or outside of their home  Patient has working smoke alarms and has no working carbon monoxide detector  Home safety hazards include: none  Nutrition:   Current diet is Regular and Limited junk food  Pt eats good portion sizes -- regularly intake of protein, vegetables, and fruits    Medications:   Patient is currently taking over-the-counter supplements  OTC medications include: see medication list  Patient is able to manage medications  Activities of Daily Living (ADLs)/Instrumental Activities of Daily Living (IADLs):   Walk and transfer into and out of bed and chair?: Yes  Dress and groom yourself?: Yes    Bathe or shower yourself?: Yes    Feed yourself? Yes  Do your laundry/housekeeping?: Yes  Manage your money, pay your bills and track your expenses?: Yes  Make your own meals?: Yes    Do your own shopping?: Yes    Previous Hospitalizations:   Any hospitalizations or ED visits within the last 12 months?: No      Advance Care Planning:   Living will: Yes    Durable POA for healthcare:  Yes    Advanced directive: Yes    Advanced directive counseling given: Yes    Five wishes given: No    Patient declined ACP directive: No    End of Life Decisions reviewed with patient: Yes    Provider agrees with end of life decisions: Yes      Comments: Pt has living will scanned in     Cognitive Screening:   Provider or family/friend/caregiver concerned regarding cognition?: No    PREVENTIVE SCREENINGS      Cardiovascular Screening:    General: Screening Not Indicated, History Lipid Disorder and Risks and Benefits Discussed      Diabetes Screening:     General: Screening Current and Risks and Benefits Discussed      Colorectal Cancer Screening:     General: Screening Current      Breast Cancer Screening:     General: Risks and Benefits Discussed      Cervical Cancer Screening:    General: Screening Not Indicated and Risks and Benefits Discussed      Osteoporosis Screening:    General: Screening Not Indicated, History Osteoporosis and Risks and Benefits Discussed      Lung Cancer Screening:     General: Screening Not Indicated and Risks and Benefits Discussed      Hepatitis C Screening:    General: Screening Current    Screening, Brief Intervention, and Referral to Treatment (SBIRT)    Screening  Typical number of drinks in a day: 0  Typical number of drinks in a week: 0  Interpretation: Low risk drinking behavior  Single Item Drug Screening:  How often have you used an illegal drug (including marijuana) or a prescription medication for non-medical reasons in the past year? never    Single Item Drug Screen Score: 0  Interpretation: Negative screen for possible drug use disorder    Brief Intervention  Alcohol & drug use screenings were reviewed  No concerns regarding substance use disorder identified  Healthy alcohol use/limits discussed  Other Counseling Topics:   Car/seat belt/driving safety, skin self-exam, sunscreen and calcium and vitamin D intake and regular weightbearing exercise  No results found       Physical Exam:     /84 (BP Location: Right arm, Patient Position: "Sitting, Cuff Size: Standard)   Pulse (!) 48   Temp 97 5 °F (36 4 °C) (Temporal)   Ht 4' 9\" (1 448 m)   Wt 57 6 kg (127 lb)   SpO2 99%   BMI 27 48 kg/m²     Physical Exam     Norva Romansh, DO  "

## 2023-05-30 ENCOUNTER — OFFICE VISIT (OUTPATIENT)
Dept: OBGYN CLINIC | Facility: CLINIC | Age: 73
End: 2023-05-30

## 2023-05-30 VITALS
DIASTOLIC BLOOD PRESSURE: 69 MMHG | HEIGHT: 57 IN | SYSTOLIC BLOOD PRESSURE: 149 MMHG | BODY MASS INDEX: 27.4 KG/M2 | WEIGHT: 127 LBS | HEART RATE: 75 BPM

## 2023-05-30 DIAGNOSIS — M17.12 PRIMARY OSTEOARTHRITIS OF LEFT KNEE: ICD-10-CM

## 2023-05-30 DIAGNOSIS — M17.11 PRIMARY OSTEOARTHRITIS OF RIGHT KNEE: Primary | ICD-10-CM

## 2023-05-30 NOTE — PROGRESS NOTES
Assessment:  1  Primary osteoarthritis of right knee        2  Primary osteoarthritis of left knee            Plan:    • Patient has chronic bilateral knee pain due to severe osteoarthritis, left knee is worse  • Weightbearing as tolerated   • Discussed with patient in detail surgery about the total knee arthroplasty procedujre  • Patient at this time would like to think over about the surgery before scheduling   • Continue taking Celebrex and Tylenol for pain relief  • Radiograph of bilateral knees upon follow-up if patient chooses for surgical intervention for her pathology  • Follow PRN         The above stated was discussed in layman's terms and the patient expressed understanding  All questions were answered to the patient's satisfaction  Subjective:   Tj Esqueda is a 72 y o  female who presents today follow-up for bilateral knee pain due to severe osteoarthritis  Patient had bilateral knee Durolane injections on 2/21/2023 and states the injections caused her to have increased pain and no relief  She does have history of having steroid injections in the past with no relief  He states she is having generalized bilateral knee pain  Pain is worse with weightbearing, transitional positions and increased activities  She does use a rollator for assistance with ambulating  Been taking Celebrex and Tylenol for pain relief  Her pain level today is a 9 out of 10  She has severe lumbar osteoarthritis and scoliosis  She states she was told in the past she will be wheelchair-bound due to her back        Review of systems negative unless otherwise specified in HPI    Past Medical History:   Diagnosis Date   • Anemia    • Arthritis    • GERD (gastroesophageal reflux disease)    • Hernia, umbilical    • Hypertension    • Spinal stenosis        Past Surgical History:   Procedure Laterality Date   • BREAST CYST EXCISION Left 1998   • FOOT SURGERY      Toe   • HERNIA REPAIR     • HYSTERECTOMY     • MAMMO (HISTORICAL)  2017   • TONSILLECTOMY         Family History   Problem Relation Age of Onset   • Hypertension Mother    • Cancer Father    • Colon cancer Father 62   • No Known Problems Sister    • Breast cancer Maternal Grandmother         not sure of her age of onset   • No Known Problems Sister    • No Known Problems Maternal Aunt    • No Known Problems Maternal Aunt    • No Known Problems Maternal Aunt    • No Known Problems Maternal Aunt    • No Known Problems Paternal Aunt    • No Known Problems Paternal Aunt        Social History     Occupational History   • Occupation: Retired    Tobacco Use   • Smoking status: Never   • Smokeless tobacco: Never   Vaping Use   • Vaping Use: Never used   Substance and Sexual Activity   • Alcohol use: No   • Drug use: No   • Sexual activity: Yes     Partners: Male     Birth control/protection: Post-menopausal         Current Outpatient Medications:   •  baclofen 20 mg tablet, TAKE 1 TABLET BY MOUTH FOUR TIMES DAILY, Disp: 120 tablet, Rfl: 6  •  Calcium Carbonate-Vitamin D 600-200 MG-UNIT TABS, Take by mouth  , Disp: , Rfl:   •  celecoxib (CeleBREX) 200 mg capsule, TAKE 1 CAPSULE(200 MG) BY MOUTH DAILY, Disp: 30 capsule, Rfl: 1  •  Cholecalciferol (VITAMIN D PO), Take 5,000 Units by mouth, Disp: , Rfl:   •  denosumab (PROLIA) 60 mg/mL, Inject under the skin, Disp: , Rfl:   •  diphenoxylate-atropine (LOMOTIL) 2 5-0 025 mg per tablet, Take 1 tablet by mouth 4 (four) times a day as needed for diarrhea, Disp: 40 tablet, Rfl: 1  •  famotidine (PEPCID) 20 mg tablet, Take 1 tablet (20 mg total) by mouth 2 (two) times a day, Disp: 100 tablet, Rfl: 3  •  Ferrous Sulfate (IRON PO), Take 65 mg by mouth daily, Disp: , Rfl:   •  hydrochlorothiazide (HYDRODIURIL) 12 5 mg tablet, Take 1 tablet (12 5 mg total) by mouth daily, Disp: 90 tablet, Rfl: 3  •  levothyroxine 50 mcg tablet, TAKE 1 TABLET BY MOUTH EVERY DAY AS DIRECTED, Disp: 90 tablet, Rfl: 3  •  potassium chloride (MICRO-K) 10 MEQ CR capsule, TAKE 2 CAPSULES BY MOUTH TWICE DAILY AS DIRECTED, Disp: 360 capsule, Rfl: 3  •  rosuvastatin (CRESTOR) 20 MG tablet, TAKE 1 TABLET BY MOUTH EVERY DAY AS DIRECTED, Disp: 90 tablet, Rfl: 3  •  telmisartan (MICARDIS) 80 MG tablet, TAKE 1 TABLET(80 MG) BY MOUTH DAILY, Disp: 30 tablet, Rfl: 6  •  triamcinolone (KENALOG) 0 1 % cream, Apply topically 2 (two) times a day, Disp: 45 g, Rfl: 1  •  zolpidem (AMBIEN) 5 mg tablet, Take by mouth, Disp: , Rfl:   •  Multiple Vitamins-Minerals (CENTRUM SILVER 50+WOMEN) TABS, Take by mouth   (Patient not taking: Reported on 2/9/2023), Disp: , Rfl:     Allergies   Allergen Reactions   • Aspirin    • Ibuprofen             Vitals:    05/30/23 1120   BP: 149/69   Pulse: 75       Objective:            Physical Exam  Physical Exam:      General Appearance:    Alert, cooperative, no distress, appears stated age   Head:    Normocephalic, without obvious abnormality, atraumatic   Eyes:    conjunctiva/corneas clear, both eyes         Nose:   Nares normal, septum midline, no drainage    Throat:   Lips normal; teeth and gums normal   Neck:    symmetrical, trachea midline, ;     thyroid:  no enlargement/   Back:     Symmetric, no curvature, ROM normal   Lungs:   No audible wheezing or labored breathing   Chest Wall:    No tenderness or deformity    Heart:    Regular rate and rhythm                         Pulses:   2+ and symmetric all extremities   Skin:   Skin color, texture, turgor normal, no rashes or lesions   Neurologic:   normal strength, sensation and reflexes     throughout                       Ortho Exam  Bilateral knees:  No abrasions or open wounds  Varus deformities of the knees  Medial lateral joint line tenderness  There is crepitus with active and passive range of motion  Stable to coronal/sagittal plane stress  Skin intact  No erythema   Neurovascularly Intact Distally     Diagnostics, reviewed and taken today if performed as documented:   The "attending physician has personally reviewed the pertinent films in PACS and interpretation is as follows: x-ray left knee demonstrates       Procedures, if performed today:    Procedures    None performed      Scribe Attestation    I,:  Ana Diaz am acting as a scribe while in the presence of the attending physician :       I,:  Diana Polanco MD personally performed the services described in this documentation    as scribed in my presence :             Portions of the record may have been created with voice recognition software  Occasional wrong word or \"sound a like\" substitutions may have occurred due to the inherent limitations of voice recognition software  Read the chart carefully and recognize, using context, where substitutions have occurred    "

## 2023-06-05 DIAGNOSIS — I10 ESSENTIAL HYPERTENSION: ICD-10-CM

## 2023-06-05 DIAGNOSIS — M19.90 ARTHRITIS: ICD-10-CM

## 2023-06-05 RX ORDER — TELMISARTAN 80 MG/1
TABLET ORAL
Qty: 30 TABLET | Refills: 6 | Status: SHIPPED | OUTPATIENT
Start: 2023-06-05

## 2023-06-05 RX ORDER — CELECOXIB 200 MG/1
CAPSULE ORAL
Qty: 30 CAPSULE | Refills: 1 | Status: SHIPPED | OUTPATIENT
Start: 2023-06-05

## 2023-07-19 ENCOUNTER — APPOINTMENT (OUTPATIENT)
Dept: LAB | Facility: CLINIC | Age: 73
End: 2023-07-19
Payer: MEDICARE

## 2023-07-31 ENCOUNTER — TELEPHONE (OUTPATIENT)
Dept: FAMILY MEDICINE CLINIC | Facility: CLINIC | Age: 73
End: 2023-07-31

## 2023-07-31 NOTE — TELEPHONE ENCOUNTER
Patient called she is getting her second shingles shot on Wednesday   ----can she get her prolia shot also --or does she have to wait?   Please advise

## 2023-08-01 NOTE — TELEPHONE ENCOUNTER
Called pt and advised her to wait 2 wks between receiving inj -- pt acknowledged and stated will do so.

## 2023-08-04 ENCOUNTER — OFFICE VISIT (OUTPATIENT)
Dept: ENDOCRINOLOGY | Facility: CLINIC | Age: 73
End: 2023-08-04
Payer: MEDICARE

## 2023-08-04 VITALS
HEIGHT: 57 IN | HEART RATE: 64 BPM | OXYGEN SATURATION: 96 % | BODY MASS INDEX: 27.44 KG/M2 | TEMPERATURE: 97.3 F | WEIGHT: 127.2 LBS

## 2023-08-04 DIAGNOSIS — E21.3 HYPERPARATHYROIDISM (HCC): Primary | ICD-10-CM

## 2023-08-04 DIAGNOSIS — R73.03 PREDIABETES: ICD-10-CM

## 2023-08-04 DIAGNOSIS — E78.2 MIXED HYPERLIPIDEMIA: ICD-10-CM

## 2023-08-04 DIAGNOSIS — M81.0 OSTEOPOROSIS WITHOUT CURRENT PATHOLOGICAL FRACTURE, UNSPECIFIED OSTEOPOROSIS TYPE: ICD-10-CM

## 2023-08-04 DIAGNOSIS — E55.9 VITAMIN D DEFICIENCY: ICD-10-CM

## 2023-08-04 DIAGNOSIS — M19.90 ARTHRITIS: ICD-10-CM

## 2023-08-04 PROCEDURE — 99214 OFFICE O/P EST MOD 30 MIN: CPT | Performed by: INTERNAL MEDICINE

## 2023-08-04 RX ORDER — CELECOXIB 200 MG/1
CAPSULE ORAL
Qty: 30 CAPSULE | Refills: 1 | Status: SHIPPED | OUTPATIENT
Start: 2023-08-04

## 2023-08-04 NOTE — PROGRESS NOTES
Follow-up Patient Progress Note      CC: hypercalcemia     History of Present Illness:   73 yr female with hypothyroidism, HTN, severe HLD, DJD spine, osteoporosis on prolia since 2015, PMR, Anemia, Essential thrombocytosis, obesity and hyperparathyroidism. She had HCTZ related hypercalcemia. Last visit was 8/4/22.     Since last visit, weight has been stable. Mobility has improved and patient is doing well.     Given her Hx of immobility and prolia use, this is most consistent with bone metabolism after stopping prolia. 5/17/2021: DXA showed severe osteoporosis with hip T scores -2.4 LS, -3.2 LTH, -3.2 LFN and -0.7 lt forearm. 10yr FRAX score 7.4% for hip fracture and 20.5% major osteoporotic fracture. Due for prolia  8/16/23 via PCP office.     Statin: Crestor 20     Patient Active Problem List   Diagnosis   • Essential (hemorrhagic) thrombocythemia ([address])   • Anemia   • Iron deficiency anemia, unspecified   • Spinal stenosis   • Hypertension   • Polymyalgia rheumatica ([address])   • Arthritis   • Hernia, umbilical   • Irritable bowel syndrome with diarrhea   • Increased BMI   • Hyperparathyroidism ([address])     Past Medical History:   Diagnosis Date   • Anemia    • Arthritis    • GERD (gastroesophageal reflux disease)    • Hernia, umbilical    • Hypertension    • Spinal stenosis       Past Surgical History:   Procedure Laterality Date   • BREAST CYST EXCISION Left 1998   • FOOT SURGERY      Toe   • HERNIA REPAIR     • HYSTERECTOMY     • MAMMO (HISTORICAL)  2017   • TONSILLECTOMY        Family History   Problem Relation Age of Onset   • Hypertension Mother    • Cancer Father    • Colon cancer Father 62   • No Known Problems Sister    • Breast cancer Maternal Grandmother         not sure of her age of onset   • No Known Problems Sister    • No Known Problems Maternal Aunt    • No Known Problems Maternal Aunt    • No Known Problems Maternal Aunt    • No Known Problems Maternal Aunt    • No Known Problems Paternal Aunt • No Known Problems Paternal Aunt      Social History     Tobacco Use   • Smoking status: Never   • Smokeless tobacco: Never   Substance Use Topics   • Alcohol use: No     Allergies   Allergen Reactions   • Aspirin    • Ibuprofen          Current Outpatient Medications:   •  baclofen 20 mg tablet, TAKE 1 TABLET BY MOUTH FOUR TIMES DAILY, Disp: 120 tablet, Rfl: 6  •  Calcium Carbonate-Vitamin D 600-200 MG-UNIT TABS, Take by mouth  , Disp: , Rfl:   •  celecoxib (CeleBREX) 200 mg capsule, TAKE 1 CAPSULE(200 MG) BY MOUTH DAILY, Disp: 30 capsule, Rfl: 1  •  Cholecalciferol (VITAMIN D PO), Take 5,000 Units by mouth, Disp: , Rfl:   •  denosumab (PROLIA) 60 mg/mL, Inject under the skin, Disp: , Rfl:   •  diphenoxylate-atropine (LOMOTIL) 2.5-0.025 mg per tablet, Take 1 tablet by mouth 4 (four) times a day as needed for diarrhea, Disp: 40 tablet, Rfl: 1  •  famotidine (PEPCID) 20 mg tablet, Take 1 tablet (20 mg total) by mouth 2 (two) times a day, Disp: 100 tablet, Rfl: 3  •  Ferrous Sulfate (IRON PO), Take 65 mg by mouth daily, Disp: , Rfl:   •  hydrochlorothiazide (HYDRODIURIL) 12.5 mg tablet, Take 1 tablet (12.5 mg total) by mouth daily, Disp: 90 tablet, Rfl: 3  •  levothyroxine 50 mcg tablet, TAKE 1 TABLET BY MOUTH EVERY DAY AS DIRECTED, Disp: 90 tablet, Rfl: 3  •  potassium chloride (MICRO-K) 10 MEQ CR capsule, TAKE 2 CAPSULES BY MOUTH TWICE DAILY AS DIRECTED, Disp: 360 capsule, Rfl: 3  •  rosuvastatin (CRESTOR) 20 MG tablet, TAKE 1 TABLET BY MOUTH EVERY DAY AS DIRECTED, Disp: 90 tablet, Rfl: 3  •  telmisartan (MICARDIS) 80 MG tablet, TAKE 1 TABLET(80 MG) BY MOUTH DAILY, Disp: 30 tablet, Rfl: 6  •  triamcinolone (KENALOG) 0.1 % cream, Apply topically 2 (two) times a day, Disp: 45 g, Rfl: 1  •  zolpidem (AMBIEN) 5 mg tablet, Take by mouth, Disp: , Rfl:   •  Multiple Vitamins-Minerals (CENTRUM SILVER 50+WOMEN) TABS, Take by mouth   (Patient not taking: Reported on 2/9/2023), Disp: , Rfl:     Review of Systems   HENT: Negative. Eyes: Negative. Respiratory: Negative. Cardiovascular: Negative. Gastrointestinal: Negative. Endocrine: Negative. Musculoskeletal: Negative. Skin: Negative. Allergic/Immunologic: Negative. Neurological: Negative. Hematological: Negative. Psychiatric/Behavioral: Negative. Physical Exam:  Body mass index is 27.53 kg/m². Pulse 64   Temp (!) 97.3 °F (36.3 °C) (Tympanic)   Ht 4' 9" (1.448 m)   Wt 57.7 kg (127 lb 3.2 oz)   SpO2 96%   BMI 27.53 kg/m²    Vitals:    08/04/23 1017   Weight: 57.7 kg (127 lb 3.2 oz)        Physical Exam  Constitutional:       General: She is not in acute distress. Appearance: She is well-developed. She is not ill-appearing. HENT:      Head: Normocephalic and atraumatic. Nose: Nose normal.      Mouth/Throat:      Pharynx: Oropharynx is clear. Eyes:      Extraocular Movements: Extraocular movements intact. Conjunctiva/sclera: Conjunctivae normal.   Neck:      Thyroid: No thyromegaly. Cardiovascular:      Rate and Rhythm: Normal rate. Pulmonary:      Effort: Pulmonary effort is normal.   Musculoskeletal:         General: No deformity. Cervical back: Normal range of motion. Skin:     Capillary Refill: Capillary refill takes less than 2 seconds. Coloration: Skin is not pale. Findings: No rash. Neurological:      Mental Status: She is alert and oriented to person, place, and time.    Psychiatric:         Behavior: Behavior normal.         Labs:   Lab Results   Component Value Date    HGBA1C 5.8 (H) 07/19/2023       Lab Results   Component Value Date    [id number] 4.052 10/20/2022       Lab Results   Component Value Date    CREATININE 0.71 10/20/2022    CREATININE 0.81 04/13/2022    CREATININE 0.91 11/22/2021    BUN 21 10/20/2022    K 4.3 10/20/2022     (H) 10/20/2022    CO2 22 10/20/2022     eGFR   Date Value Ref Range Status   10/20/2022 86 ml/min/1.73sq m Final       Lab Results   Component Value Date    ALT 11 10/20/2022    AST 17 10/20/2022    ALKPHOS 77 10/20/2022       Lab Results   Component Value Date    CHOLESTEROL 161 04/13/2022    CHOLESTEROL 173 11/22/2021    CHOLESTEROL 176 03/19/2021     Lab Results   Component Value Date    HDL 63 04/13/2022    HDL 64 11/22/2021    HDL 73 03/19/2021     Lab Results   Component Value Date    TRIG 65 04/13/2022    TRIG 86 11/22/2021    TRIG 66 03/19/2021     Lab Results   Component Value Date    NONHDLC 98 04/13/2022    [address] 109 11/22/2021    [address] 103 03/19/2021         Impression:  1. Hyperparathyroidism ([address])    2. Vitamin D deficiency    3. Mixed hyperlipidemia    4. Osteoporosis without current pathological fracture, unspecified osteoporosis type    5. Prediabetes         Plan:    Diagnoses and all orders for this visit:    Hyperparathyroidism ([address]). She has normocalcemic hyperparathyroidism. She may have either primary or secondary hyperparathyroid state. Causes for secondary hyperparathyroidism would include low calcium intake, vitamin D deficiency or Prolia use. Since we do not have any labs since last year, today we agreed to repeat labs as listed below based on which further decisions can be made next visit. We will investigate for primary hyperparathyroidism if there is significant hypercalcemia associated with elevated PTH. Advised to continue calcium 1200 Mg daily and vitamin D3 5000 IU daily OTC. Her Prolia is due in 2 weeks. Follow-up in 6 months. -     Phosphorus; Future  -     PTH, intact; Future  -     Comprehensive metabolic panel; Future    Vitamin D deficiency  -     Vitamin D 25 hydroxy; Future    Mixed hyperlipidemia. ASCVD risk score is 17.2%. She is currently on Crestor. Osteoporosis without current pathological fracture, unspecified osteoporosis type. Due for next DEXA bone scan and it is scheduled for December 2023. Prediabetes. Recent A1c was 5.8%. Last year his A1c was 5.5%.   Suggested to continue diet and lifestyle changes with regular activity as she is already doing. For now no intervention should be needed. If A1c crosses 6%, we may consider using metformin therapy. I have spent 35 minutes with patient today in which greater than 50% of this time was spent in counseling/coordination of care. Discussed with the patient and all questioned fully answered. She will call me if any problems arise. Educated/ Counseled patient on diagnostic test results, prognosis, risk vs benefit of treatment options, importance of treatment compliance, healthy life and lifestyle choices.       Emma

## 2023-08-18 ENCOUNTER — CLINICAL SUPPORT (OUTPATIENT)
Dept: FAMILY MEDICINE CLINIC | Facility: CLINIC | Age: 73
End: 2023-08-18
Payer: MEDICARE

## 2023-08-18 DIAGNOSIS — M81.0 AGE-RELATED OSTEOPOROSIS WITHOUT CURRENT PATHOLOGICAL FRACTURE: Primary | ICD-10-CM

## 2023-08-18 PROCEDURE — 96372 THER/PROPH/DIAG INJ SC/IM: CPT

## 2023-08-21 ENCOUNTER — OFFICE VISIT (OUTPATIENT)
Dept: FAMILY MEDICINE CLINIC | Facility: CLINIC | Age: 73
End: 2023-08-21
Payer: MEDICARE

## 2023-08-21 VITALS
WEIGHT: 126.8 LBS | OXYGEN SATURATION: 99 % | TEMPERATURE: 99.1 F | HEIGHT: 57 IN | DIASTOLIC BLOOD PRESSURE: 72 MMHG | BODY MASS INDEX: 27.36 KG/M2 | HEART RATE: 71 BPM | SYSTOLIC BLOOD PRESSURE: 132 MMHG

## 2023-08-21 DIAGNOSIS — Z79.899 MEDICATION MANAGEMENT: ICD-10-CM

## 2023-08-21 DIAGNOSIS — I10 ESSENTIAL HYPERTENSION: Primary | ICD-10-CM

## 2023-08-21 DIAGNOSIS — Z79.899 ENCOUNTER FOR LONG-TERM (CURRENT) USE OF MEDICATIONS: ICD-10-CM

## 2023-08-21 DIAGNOSIS — E21.3 HYPERPARATHYROIDISM (HCC): ICD-10-CM

## 2023-08-21 DIAGNOSIS — Z71.2 ENCOUNTER TO DISCUSS TEST RESULTS: ICD-10-CM

## 2023-08-21 DIAGNOSIS — M81.0 AGE-RELATED OSTEOPOROSIS WITHOUT CURRENT PATHOLOGICAL FRACTURE: ICD-10-CM

## 2023-08-21 DIAGNOSIS — K58.0 IRRITABLE BOWEL SYNDROME WITH DIARRHEA: ICD-10-CM

## 2023-08-21 DIAGNOSIS — D47.2 MONOCLONAL GAMMOPATHY OF UNDETERMINED SIGNIFICANCE: ICD-10-CM

## 2023-08-21 DIAGNOSIS — R63.8 INCREASED BMI: ICD-10-CM

## 2023-08-21 PROCEDURE — 99214 OFFICE O/P EST MOD 30 MIN: CPT | Performed by: FAMILY MEDICINE

## 2023-08-21 RX ORDER — AMLODIPINE BESYLATE 5 MG/1
5 TABLET ORAL DAILY
Qty: 90 TABLET | Refills: 3 | Status: SHIPPED | OUTPATIENT
Start: 2023-08-21

## 2023-08-21 NOTE — PROGRESS NOTES
Name: Darvin Hoffmann      : 1950      MRN: [id number]  Encounter Provider: Serjio Starr DO  Encounter Date: 2023   Encounter department: [address]     1. Essential hypertension  -     amLODIPine (NORVASC) 5 mg tablet; Take 1 tablet (5 mg total) by mouth daily    2. Age-related osteoporosis without current pathological fracture    3. Irritable bowel syndrome with diarrhea  Comments:  Getting diarrhea on HCTZ--will stop and replace with Norvasc 2.5    4. Encounter to discuss test results  Comments: All labs discussed done within the last 6 months    5. Encounter for long-term (current) use of medications  Comments: All medications reviewed for safety efficacy and tolerance--taking Celebrex only  not daily    6. Hyperparathyroidism [facility])  Comments:  seeing Dr. Afshin Luo for Ca    7. Monoclonal gammopathy of undetermined significance  Comments: Followed by heme-onc--Dr. Sindi Murrieta    8. Medication management    9. Increased BMI        Subjective      HPI 68-year-old Tone Wei well-known to me for many years is following for multiple medical issues as listed in the visit diagnosis. All of them were discussed in detail with her  present. New issue is onset of diarrhea she has irritable bowel syndrome but she insists that the HCTZ that I started last time is causing    Several "blow outs" in the morning. We will discontinue that and replace with Norvasc 5 mg 1/2 tablet once daily    Review of Systems   Constitutional: Negative for activity change (in WC), appetite change, chills, diaphoresis, fatigue and fever. HENT: Negative for congestion, ear discharge, ear pain, facial swelling, nosebleeds, sore throat, tinnitus, trouble swallowing and voice change. Eyes: Negative for photophobia, pain, discharge, redness, itching and visual disturbance.    Respiratory: Negative for apnea, cough, choking, chest tightness and shortness of breath. Cardiovascular: Negative for chest pain, palpitations and leg swelling. Denies any and all cardiac symptoms   Gastrointestinal: Negative for abdominal distention, abdominal pain, blood in stool, constipation, diarrhea, nausea and vomiting. Endocrine: Negative for cold intolerance, heat intolerance, polydipsia, polyphagia and polyuria. Genitourinary: Negative for decreased urine volume, difficulty urinating, dysuria, enuresis, frequency, hematuria, pelvic pain, urgency and vaginal bleeding. Musculoskeletal: Positive for arthralgias, back pain, gait problem and myalgias. Negative for joint swelling, neck pain and neck stiffness. Severe scoliosis with kyphosis throughout spine especially lumbar spine. History of PMR on prednisone. Sees Dr. Jaylon Jj q6 mo and he is rx'ing the arthritis   Skin: Positive for pallor. Negative for color change and rash. Allergic/Immunologic: Negative for immunocompromised state. Neurological: Negative for dizziness, seizures, facial asymmetry, light-headedness, numbness and headaches. Hematological: Negative for adenopathy. Psychiatric/Behavioral: Negative for agitation, behavioral problems, confusion, decreased concentration, dysphoric mood and hallucinations.        Current Outpatient Medications on File Prior to Visit   Medication Sig   • baclofen 20 mg tablet TAKE 1 TABLET BY MOUTH FOUR TIMES DAILY   • Calcium Carbonate-Vitamin D 600-200 MG-UNIT TABS Take by mouth     • celecoxib (CeleBREX) 200 mg capsule TAKE 1 CAPSULE(200 MG) BY MOUTH DAILY   • Cholecalciferol (VITAMIN D PO) Take 5,000 Units by mouth   • denosumab (PROLIA) 60 mg/mL Inject under the skin   • diphenoxylate-atropine (LOMOTIL) 2.5-0.025 mg per tablet Take 1 tablet by mouth 4 (four) times a day as needed for diarrhea   • famotidine (PEPCID) 20 mg tablet Take 1 tablet (20 mg total) by mouth 2 (two) times a day   • Ferrous Sulfate (IRON PO) Take 65 mg by mouth daily   • levothyroxine 50 mcg tablet TAKE 1 TABLET BY MOUTH EVERY DAY AS DIRECTED   • potassium chloride (MICRO-K) 10 MEQ CR capsule TAKE 2 CAPSULES BY MOUTH TWICE DAILY AS DIRECTED   • rosuvastatin (CRESTOR) 20 MG tablet TAKE 1 TABLET BY MOUTH EVERY DAY AS DIRECTED   • telmisartan (MICARDIS) 80 MG tablet TAKE 1 TABLET(80 MG) BY MOUTH DAILY   • triamcinolone (KENALOG) 0.1 % cream Apply topically 2 (two) times a day   • zolpidem (AMBIEN) 5 mg tablet Take by mouth   • hydrochlorothiazide (HYDRODIURIL) 12.5 mg tablet Take 1 tablet (12.5 mg total) by mouth daily (Patient not taking: Reported on 8/21/2023)   • [DISCONTINUED] Multiple Vitamins-Minerals (CENTRUM SILVER 50+WOMEN) TABS Take by mouth   (Patient not taking: Reported on 2/9/2023)       Objective     /72 (BP Location: Left arm, Patient Position: Sitting, Cuff Size: Standard)   Pulse 71   Temp 99.1 °F (37.3 °C) (Temporal)   Ht 4' 9" (1.448 m)   Wt 57.5 kg (126 lb 12.8 oz)   SpO2 99%   BMI 27.44 kg/m²     Physical Exam  Vitals and nursing note reviewed. Constitutional:       General: She is not in acute distress. Appearance: Normal appearance. She is well-developed. She is obese. She is not ill-appearing, toxic-appearing or diaphoretic. HENT:      Head: Normocephalic and atraumatic. Nose: Nose normal.   Eyes:      General: No scleral icterus. Right eye: No discharge. Left eye: No discharge. Conjunctiva/sclera: Conjunctivae normal.      Pupils: Pupils are equal, round, and reactive to light. Neck:      Thyroid: No thyromegaly. Trachea: No tracheal deviation. Cardiovascular:      Rate and Rhythm: Normal rate and regular rhythm. Heart sounds: Normal heart sounds. Pulmonary:      Effort: No respiratory distress. Breath sounds: Normal breath sounds. No wheezing or rales. Chest:      Chest wall: No tenderness. Abdominal:      Palpations: Abdomen is soft. Musculoskeletal:         General: No tenderness or deformity.  Normal range of motion. Cervical back: Normal range of motion and neck supple. Right lower leg: No edema. Left lower leg: No edema. Comments: Patient with many many years severe kyphoscoliosis lumbar spine. Cannot ambulate without walker and review of rheumatologist notes suggest impending surgery needed. Skin:     General: Skin is warm and dry. Coloration: Skin is not pale. Findings: No erythema or rash. Neurological:      General: No focal deficit present. Mental Status: She is alert and oriented to person, place, and time. Mental status is at baseline. Cranial Nerves: No cranial nerve deficit. Gait: Gait abnormal.      Deep Tendon Reflexes: Reflexes abnormal.   Psychiatric:         Mood and Affect: Mood normal.         Behavior: Behavior normal.         Thought Content: Thought content normal.         Judgment: Judgment normal.              I personally reviewed the recent (and prior)  lab results, the image studies, pathology, other specialty/physicians consult notes and recommendations, and outside medical records from other institutions, as appropriate. I had a lengthy discussion with the patient and shared the work-up findings. We discussed the diagnosis and management plan. I spent  30  minutes reviewing the records (labs, clinician notes, outside records, medical history, ordering medicine/tests/procedures, interpreting the imaging/labs previously done) and coordination of care as well as direct time with the patient today, of which greater than 50% of the time was spent in counseling and coordination of care with the patient/family.       Rafael Calvert, DO

## 2023-09-19 ENCOUNTER — OFFICE VISIT (OUTPATIENT)
Dept: OBGYN CLINIC | Facility: CLINIC | Age: 73
End: 2023-09-19
Payer: MEDICARE

## 2023-09-19 VITALS
DIASTOLIC BLOOD PRESSURE: 79 MMHG | HEART RATE: 68 BPM | OXYGEN SATURATION: 100 % | WEIGHT: 126.2 LBS | SYSTOLIC BLOOD PRESSURE: 124 MMHG | BODY MASS INDEX: 27.22 KG/M2 | HEIGHT: 57 IN

## 2023-09-19 DIAGNOSIS — M17.11 PRIMARY OSTEOARTHRITIS OF RIGHT KNEE: Primary | ICD-10-CM

## 2023-09-19 DIAGNOSIS — M17.12 PRIMARY OSTEOARTHRITIS OF LEFT KNEE: ICD-10-CM

## 2023-09-19 PROCEDURE — 99213 OFFICE O/P EST LOW 20 MIN: CPT | Performed by: ORTHOPAEDIC SURGERY

## 2023-09-19 NOTE — PROGRESS NOTES
Assessment:   Diagnosis ICD-10-CM Associated Orders   1. Primary osteoarthritis of right knee  M17.11       2. Primary osteoarthritis of left knee  M17.12           Plan:  ·  On exam, she has limited ROM secondary to stiffness  · At this time, she has not made a decision on how she wishes to treat. She wishes to not treat with injections of any kind. She wants to continue with the celebrex and tylenol. To do next visit:  Return if symptoms worsen or fail to improve. The above stated was discussed in layman's terms and the patient expressed understanding. All questions were answered to the patient's satisfaction. Scribe Attestation    I,:  Sulma Valdez am acting as a scribe while in the presence of the attending physician.:       I,:  Ofelia Camejo MD personally performed the services described in this documentation    as scribed in my presence.:             Subjective:   Brionna Wiseman is a 72 y.o. female who presents today for follow-up for her bilateral knee pain due to osteoarthritis. She is treated with bilateral knee Durolane injections on 2/21/2023 and states the injections caused her to have increased pain and no relief. She does have history of having steroid injections in the past with no relief. Been taking Celebrex and Tylenol for pain relief. Her pain level today is a 9 out of 10.     She has severe lumbar osteoarthritis and scoliosis. She states she was told in the past she will be wheelchair-bound due to her back. Review of systems negative unless otherwise specified in HPI  Review of Systems   Constitutional: Negative for chills, fever and unexpected weight change. HENT: Negative for hearing loss, nosebleeds and sore throat. Eyes: Negative for pain, redness and visual disturbance. Respiratory: Negative for cough, shortness of breath and wheezing. Cardiovascular: Negative for chest pain, palpitations and leg swelling.    Gastrointestinal: Negative for abdominal pain and nausea. Genitourinary: Negative for dyspareunia, dysuria and frequency. Musculoskeletal: Positive for arthralgias. Skin: Negative for rash and wound. Neurological: Negative for dizziness, numbness and headaches. Psychiatric/Behavioral: Negative for decreased concentration and suicidal ideas. The patient is not nervous/anxious.         Past Medical History:   Diagnosis Date   • Anemia    • Arthritis    • GERD (gastroesophageal reflux disease)    • Hernia, umbilical    • Hypertension    • Spinal stenosis        Past Surgical History:   Procedure Laterality Date   • BREAST CYST EXCISION Left 1998   • FOOT SURGERY      Toe   • HERNIA REPAIR     • HYSTERECTOMY     • MAMMO (HISTORICAL)  2017   • TONSILLECTOMY         Family History   Problem Relation Age of Onset   • Hypertension Mother    • Cancer Father    • Colon cancer Father 62   • No Known Problems Sister    • Breast cancer Maternal Grandmother         not sure of her age of onset   • No Known Problems Sister    • No Known Problems Maternal Aunt    • No Known Problems Maternal Aunt    • No Known Problems Maternal Aunt    • No Known Problems Maternal Aunt    • No Known Problems Paternal Aunt    • No Known Problems Paternal Aunt        Social History     Occupational History   • Occupation: Retired    Tobacco Use   • Smoking status: Never   • Smokeless tobacco: Never   Vaping Use   • Vaping Use: Never used   Substance and Sexual Activity   • Alcohol use: No   • Drug use: No   • Sexual activity: Yes     Partners: Male     Birth control/protection: Post-menopausal         Current Outpatient Medications:   •  amLODIPine (NORVASC) 5 mg tablet, Take 1 tablet (5 mg total) by mouth daily, Disp: 90 tablet, Rfl: 3  •  baclofen 20 mg tablet, TAKE 1 TABLET BY MOUTH FOUR TIMES DAILY, Disp: 120 tablet, Rfl: 6  •  Calcium Carbonate-Vitamin D 600-200 MG-UNIT TABS, Take 1 tablet by mouth in the morning, Disp: , Rfl:   •  celecoxib (CeleBREX) 200 mg capsule, TAKE 1 CAPSULE(200 MG) BY MOUTH DAILY, Disp: 30 capsule, Rfl: 1  •  Cholecalciferol (VITAMIN D PO), Take 5,000 Units by mouth, Disp: , Rfl:   •  denosumab (PROLIA) 60 mg/mL, Inject 60 mg under the skin every 6 (six) months, Disp: , Rfl:   •  diphenoxylate-atropine (LOMOTIL) 2.5-0.025 mg per tablet, Take 1 tablet by mouth 4 (four) times a day as needed for diarrhea, Disp: 40 tablet, Rfl: 1  •  famotidine (PEPCID) 20 mg tablet, Take 1 tablet (20 mg total) by mouth 2 (two) times a day, Disp: 100 tablet, Rfl: 3  •  Ferrous Sulfate (IRON PO), Take 65 mg by mouth daily, Disp: , Rfl:   •  levothyroxine 50 mcg tablet, TAKE 1 TABLET BY MOUTH EVERY DAY AS DIRECTED, Disp: 90 tablet, Rfl: 3  •  potassium chloride (MICRO-K) 10 MEQ CR capsule, TAKE 2 CAPSULES BY MOUTH TWICE DAILY AS DIRECTED, Disp: 360 capsule, Rfl: 3  •  rosuvastatin (CRESTOR) 20 MG tablet, TAKE 1 TABLET BY MOUTH EVERY DAY AS DIRECTED, Disp: 90 tablet, Rfl: 3  •  telmisartan (MICARDIS) 80 MG tablet, TAKE 1 TABLET(80 MG) BY MOUTH DAILY, Disp: 30 tablet, Rfl: 6  •  zolpidem (AMBIEN) 5 mg tablet, Take 5 mg by mouth daily at bedtime as needed, Disp: , Rfl:   •  hydrochlorothiazide (HYDRODIURIL) 12.5 mg tablet, Take 1 tablet (12.5 mg total) by mouth daily (Patient not taking: Reported on 8/21/2023), Disp: 90 tablet, Rfl: 3  •  triamcinolone (KENALOG) 0.1 % cream, Apply topically 2 (two) times a day (Patient not taking: Reported on 9/19/2023), Disp: 45 g, Rfl: 1    Allergies   Allergen Reactions   • Aspirin Hives   • Ibuprofen Hives            Vitals:    09/19/23 1103   BP: 124/79   Pulse: 68   SpO2: 100%       Objective:                     Right Knee Exam     Tenderness   The patient is experiencing tenderness in the medial joint line and lateral joint line.     Range of Motion   Extension: 5   Flexion: 100     Tests   Varus: negative Valgus: negative    Other   Erythema: absent  Sensation: normal  Pulse: present  Swelling: none  Effusion: no effusion present    Comments:  Some punctate lesions noted over the lower leg with no drainage  No pitting edema noted      Left Knee Exam     Tenderness   The patient is experiencing tenderness in the medial joint line and lateral joint line. Range of Motion   Extension: 5   Flexion: 80     Tests   Varus: negative Valgus: negative    Other   Erythema: absent  Sensation: normal  Pulse: present  Swelling: none  Effusion: no effusion present    Comments:  Some punctate lesions noted over the lower leg  No pitting edema noted               Diagnostics, reviewed and taken today if performed as documented:    None performed      The attending physician has personally reviewed the pertinent films in PACS and interpretation is as follows:      Procedures, if performed today:    Procedures    None performed      Portions of the record may have been created with voice recognition software. Occasional wrong word or "sound a like" substitutions may have occurred due to the inherent limitations of voice recognition software. Read the chart carefully and recognize, using context, where substitutions have occurred.

## 2023-10-03 DIAGNOSIS — M62.838 MUSCLE SPASM OF BOTH LOWER LEGS: ICD-10-CM

## 2023-10-03 DIAGNOSIS — M19.90 ARTHRITIS: ICD-10-CM

## 2023-10-03 DIAGNOSIS — E87.6 HYPOPOTASSEMIA: ICD-10-CM

## 2023-10-03 RX ORDER — BACLOFEN 20 MG/1
TABLET ORAL
Qty: 120 TABLET | Refills: 6 | Status: SHIPPED | OUTPATIENT
Start: 2023-10-03

## 2023-10-03 RX ORDER — CELECOXIB 200 MG/1
CAPSULE ORAL
Qty: 30 CAPSULE | Refills: 1 | Status: SHIPPED | OUTPATIENT
Start: 2023-10-03

## 2023-10-03 RX ORDER — POTASSIUM CHLORIDE 750 MG/1
CAPSULE, EXTENDED RELEASE ORAL
Qty: 360 CAPSULE | Refills: 1 | Status: SHIPPED | OUTPATIENT
Start: 2023-10-03

## 2023-10-18 ENCOUNTER — IMMUNIZATIONS (OUTPATIENT)
Dept: FAMILY MEDICINE CLINIC | Facility: CLINIC | Age: 73
End: 2023-10-18
Payer: MEDICARE

## 2023-10-18 ENCOUNTER — APPOINTMENT (OUTPATIENT)
Dept: LAB | Facility: CLINIC | Age: 73
End: 2023-10-18
Payer: MEDICARE

## 2023-10-18 DIAGNOSIS — E21.3 HYPERPARATHYROIDISM (HCC): ICD-10-CM

## 2023-10-18 DIAGNOSIS — E55.9 VITAMIN D DEFICIENCY: ICD-10-CM

## 2023-10-18 DIAGNOSIS — Z23 ENCOUNTER FOR IMMUNIZATION: Primary | ICD-10-CM

## 2023-10-18 PROCEDURE — G0008 ADMIN INFLUENZA VIRUS VAC: HCPCS

## 2023-10-18 PROCEDURE — 90662 IIV NO PRSV INCREASED AG IM: CPT

## 2023-10-24 ENCOUNTER — TELEPHONE (OUTPATIENT)
Dept: HEMATOLOGY ONCOLOGY | Facility: CLINIC | Age: 73
End: 2023-10-24

## 2023-10-24 NOTE — TELEPHONE ENCOUNTER
Appointment Change  Cancel, Reschedule, Change to Virtual      Who are you speaking with? Patient   If it is not the patient, is the caller listed on the communication consent form? N/A   Which provider is the appointment scheduled with? Dr. Brent Le   When was the original appointment scheduled? Please list date and time 12/11/2023 @10:40AM    At which location is the appointment scheduled to take place? [facility] [place]   Was the appointment rescheduled? Was the appointment changed from an in person visit to a virtual visit? If so, please list the details of the change. Yes, 12/22/2023 @3:40PM    What is the reason for the appointment change?  Provider unavailable

## 2023-10-30 DIAGNOSIS — L28.0 NEURODERMATITIS, LOCALIZED: ICD-10-CM

## 2023-10-30 RX ORDER — TRIAMCINOLONE ACETONIDE 1 MG/G
CREAM TOPICAL 2 TIMES DAILY
Qty: 45 G | Refills: 1 | Status: SHIPPED | OUTPATIENT
Start: 2023-10-30

## 2023-10-30 NOTE — TELEPHONE ENCOUNTER
Reason for call:   [x] Refill   [] Prior Auth  [] Other:     Office:   [x] PCP/Provider - dr haines  [] Specialty/Provider -     Medication: kenalog cream    Dose/Frequency: 0.1%     Quantity: 1    Pharmacy: [address] [place] [address]    Does the patient have enough for 3 days?    [] Yes   [] No - Send as HP to POD

## 2023-11-02 DIAGNOSIS — E03.9 HYPOTHYROIDISM, UNSPECIFIED TYPE: ICD-10-CM

## 2023-11-02 DIAGNOSIS — E78.2 MIXED HYPERLIPIDEMIA: ICD-10-CM

## 2023-11-02 RX ORDER — ROSUVASTATIN CALCIUM 20 MG/1
TABLET, COATED ORAL
Qty: 30 TABLET | Refills: 0 | Status: SHIPPED | OUTPATIENT
Start: 2023-11-02

## 2023-11-02 RX ORDER — LEVOTHYROXINE SODIUM 0.05 MG/1
TABLET ORAL
Qty: 30 TABLET | Refills: 0 | Status: SHIPPED | OUTPATIENT
Start: 2023-11-02

## 2023-11-20 ENCOUNTER — OFFICE VISIT (OUTPATIENT)
Dept: FAMILY MEDICINE CLINIC | Facility: CLINIC | Age: 73
End: 2023-11-20
Payer: MEDICARE

## 2023-11-20 VITALS
BODY MASS INDEX: 27.4 KG/M2 | TEMPERATURE: 97.9 F | HEIGHT: 57 IN | OXYGEN SATURATION: 99 % | SYSTOLIC BLOOD PRESSURE: 132 MMHG | DIASTOLIC BLOOD PRESSURE: 78 MMHG | HEART RATE: 72 BPM | WEIGHT: 127 LBS

## 2023-11-20 DIAGNOSIS — E78.2 MIXED HYPERLIPIDEMIA: ICD-10-CM

## 2023-11-20 DIAGNOSIS — E03.9 HYPOTHYROIDISM, UNSPECIFIED TYPE: ICD-10-CM

## 2023-11-20 DIAGNOSIS — E55.9 VITAMIN D INSUFFICIENCY: ICD-10-CM

## 2023-11-20 DIAGNOSIS — R53.83 FATIGUE, UNSPECIFIED TYPE: ICD-10-CM

## 2023-11-20 DIAGNOSIS — L28.0 NEURODERMATITIS, LOCALIZED: ICD-10-CM

## 2023-11-20 DIAGNOSIS — L30.9 DERMATITIS: Primary | ICD-10-CM

## 2023-11-20 DIAGNOSIS — I10 ESSENTIAL HYPERTENSION: ICD-10-CM

## 2023-11-20 PROCEDURE — 99214 OFFICE O/P EST MOD 30 MIN: CPT | Performed by: FAMILY MEDICINE
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
## 2023-11-21 NOTE — ASSESSMENT & PLAN NOTE
She has a speckled dense petechial rash, some of which are bleeding but not actively, just dry scabs. The  thinks it is due to bed bugs or fleas in the chair in the living room. Will check blood tests to make sure it is not a hematologic problem.

## 2023-11-22 ENCOUNTER — APPOINTMENT (OUTPATIENT)
Dept: LAB | Facility: CLINIC | Age: 73
End: 2023-11-22
Payer: MEDICARE

## 2023-11-22 DIAGNOSIS — L28.0 NEURODERMATITIS, LOCALIZED: ICD-10-CM

## 2023-11-22 DIAGNOSIS — L30.9 DERMATITIS: ICD-10-CM

## 2023-11-22 DIAGNOSIS — E55.9 VITAMIN D INSUFFICIENCY: ICD-10-CM

## 2023-11-22 DIAGNOSIS — I10 ESSENTIAL HYPERTENSION: ICD-10-CM

## 2023-11-22 DIAGNOSIS — E03.9 HYPOTHYROIDISM, UNSPECIFIED TYPE: ICD-10-CM

## 2023-11-22 DIAGNOSIS — E78.2 MIXED HYPERLIPIDEMIA: ICD-10-CM

## 2023-11-22 LAB
25(OH)D3 SERPL-MCNC: 28.3 NG/ML (ref 30–100)
ALBUMIN SERPL BCP-MCNC: 3.9 G/DL (ref 3.5–5)
ALP SERPL-CCNC: 67 U/L (ref 34–104)
ALT SERPL W P-5'-P-CCNC: 9 U/L (ref 7–52)
ANION GAP SERPL CALCULATED.3IONS-SCNC: 10 MMOL/L
AST SERPL W P-5'-P-CCNC: 14 U/L (ref 13–39)
BILIRUB SERPL-MCNC: 0.5 MG/DL (ref 0.2–1)
BILIRUB UR QL STRIP: NEGATIVE
BUN SERPL-MCNC: 23 MG/DL (ref 5–25)
CALCIUM SERPL-MCNC: 9.4 MG/DL (ref 8.4–10.2)
CHLORIDE SERPL-SCNC: 107 MMOL/L (ref 96–108)
CHOLEST SERPL-MCNC: 135 MG/DL
CLARITY UR: CLEAR
CO2 SERPL-SCNC: 22 MMOL/L (ref 21–32)
COLOR UR: COLORLESS
CREAT SERPL-MCNC: 0.64 MG/DL (ref 0.6–1.3)
CREAT UR-MCNC: 35.5 MG/DL
ERYTHROCYTE [DISTWIDTH] IN BLOOD BY AUTOMATED COUNT: 14 % (ref 11.6–15.1)
GFR SERPL CREATININE-BSD FRML MDRD: 90 ML/MIN/1.73SQ M
GLUCOSE P FAST SERPL-MCNC: 78 MG/DL (ref 65–99)
GLUCOSE UR STRIP-MCNC: NEGATIVE MG/DL
HCT VFR BLD AUTO: 38.2 % (ref 34.8–46.1)
HDLC SERPL-MCNC: 55 MG/DL
HGB BLD-MCNC: 11.7 G/DL (ref 11.5–15.4)
HGB UR QL STRIP.AUTO: NEGATIVE
KETONES UR STRIP-MCNC: NEGATIVE MG/DL
LDLC SERPL CALC-MCNC: 68 MG/DL (ref 0–100)
LEUKOCYTE ESTERASE UR QL STRIP: NEGATIVE
MCH RBC QN AUTO: 29.5 PG (ref 26.8–34.3)
MCHC RBC AUTO-ENTMCNC: 30.6 G/DL (ref 31.4–37.4)
MCV RBC AUTO: 96 FL (ref 82–98)
MICROALBUMIN UR-MCNC: <7 MG/L
MICROALBUMIN/CREAT 24H UR: <20 MG/G CREATININE (ref 0–30)
NITRITE UR QL STRIP: NEGATIVE
NONHDLC SERPL-MCNC: 80 MG/DL
PH UR STRIP.AUTO: 6 [PH]
PLATELET # BLD AUTO: 394 THOUSANDS/UL (ref 149–390)
PMV BLD AUTO: 9 FL (ref 8.9–12.7)
POTASSIUM SERPL-SCNC: 4.1 MMOL/L (ref 3.5–5.3)
PROT SERPL-MCNC: 7.6 G/DL (ref 6.4–8.4)
PROT UR STRIP-MCNC: NEGATIVE MG/DL
RBC # BLD AUTO: 3.97 MILLION/UL (ref 3.81–5.12)
SODIUM SERPL-SCNC: 139 MMOL/L (ref 135–147)
SP GR UR STRIP.AUTO: 1.01 (ref 1–1.03)
TRIGL SERPL-MCNC: 62 MG/DL
TSH SERPL DL<=0.05 MIU/L-ACNC: 2.95 UIU/ML (ref 0.45–4.5)
UROBILINOGEN UR STRIP-ACNC: <2 MG/DL
WBC # BLD AUTO: 5.97 THOUSAND/UL (ref 4.31–10.16)

## 2023-11-22 PROCEDURE — 80053 COMPREHEN METABOLIC PANEL: CPT

## 2023-11-22 PROCEDURE — 82306 VITAMIN D 25 HYDROXY: CPT

## 2023-11-22 PROCEDURE — 84443 ASSAY THYROID STIM HORMONE: CPT

## 2023-11-22 PROCEDURE — 82043 UR ALBUMIN QUANTITATIVE: CPT | Performed by: FAMILY MEDICINE

## 2023-11-22 PROCEDURE — 80061 LIPID PANEL: CPT

## 2023-11-22 PROCEDURE — 85027 COMPLETE CBC AUTOMATED: CPT

## 2023-11-22 PROCEDURE — 36415 COLL VENOUS BLD VENIPUNCTURE: CPT

## 2023-11-22 PROCEDURE — 82570 ASSAY OF URINE CREATININE: CPT | Performed by: FAMILY MEDICINE

## 2023-11-22 PROCEDURE — 81003 URINALYSIS AUTO W/O SCOPE: CPT | Performed by: FAMILY MEDICINE

## 2023-11-28 NOTE — TELEPHONE ENCOUNTER
CALL RETURN FORM   Reason for patient call? Patient calling to request updated lab orders for next appmt  Patient's primary oncologist? CHRIS JAIMES    Name of person the patient was calling for? Miladis   Any additional information to add, if applicable? [phone]   Informed patient that the message will be forwarded to the team and someone will get back to them as soon as possible    Did you relay this information to the patient?   yes PROVIDER:[TOKEN:[[id number]],ESTABLISHEDPATIENT:[[id number]]]

## 2023-11-29 DIAGNOSIS — K58.0 IRRITABLE BOWEL SYNDROME WITH DIARRHEA: ICD-10-CM

## 2023-11-29 RX ORDER — DIPHENOXYLATE HYDROCHLORIDE AND ATROPINE SULFATE 2.5; .025 MG/1; MG/1
TABLET ORAL
Qty: 40 TABLET | Refills: 3 | Status: SHIPPED | OUTPATIENT
Start: 2023-11-29

## 2023-12-02 DIAGNOSIS — M19.90 ARTHRITIS: ICD-10-CM

## 2023-12-02 DIAGNOSIS — E03.9 HYPOTHYROIDISM, UNSPECIFIED TYPE: ICD-10-CM

## 2023-12-02 DIAGNOSIS — E78.2 MIXED HYPERLIPIDEMIA: ICD-10-CM

## 2023-12-04 RX ORDER — LEVOTHYROXINE SODIUM 0.05 MG/1
TABLET ORAL
Qty: 30 TABLET | Refills: 5 | Status: SHIPPED | OUTPATIENT
Start: 2023-12-04

## 2023-12-04 RX ORDER — ROSUVASTATIN CALCIUM 20 MG/1
TABLET, COATED ORAL
Qty: 30 TABLET | Refills: 5 | Status: SHIPPED | OUTPATIENT
Start: 2023-12-04

## 2023-12-04 RX ORDER — CELECOXIB 200 MG/1
CAPSULE ORAL
Qty: 30 CAPSULE | Refills: 1 | Status: SHIPPED | OUTPATIENT
Start: 2023-12-04

## 2023-12-14 ENCOUNTER — TELEPHONE (OUTPATIENT)
Dept: HEMATOLOGY ONCOLOGY | Facility: CLINIC | Age: 73
End: 2023-12-14

## 2023-12-14 DIAGNOSIS — D47.2 MGUS (MONOCLONAL GAMMOPATHY OF UNKNOWN SIGNIFICANCE): Primary | ICD-10-CM

## 2023-12-19 ENCOUNTER — HOSPITAL ENCOUNTER (OUTPATIENT)
Dept: RADIOLOGY | Facility: HOSPITAL | Age: 73
Discharge: HOME | End: 2023-12-19
Attending: OBSTETRICS & GYNECOLOGY
Payer: MEDICARE

## 2023-12-19 ENCOUNTER — APPOINTMENT (OUTPATIENT)
Dept: LAB | Facility: HOSPITAL | Age: 73
End: 2023-12-19
Payer: MEDICARE

## 2023-12-19 VITALS — HEIGHT: 57 IN | BODY MASS INDEX: 27.4 KG/M2 | WEIGHT: 127 LBS

## 2023-12-19 DIAGNOSIS — M81.6 LOCALIZED OSTEOPOROSIS WITHOUT CURRENT PATHOLOGICAL FRACTURE: ICD-10-CM

## 2023-12-19 DIAGNOSIS — Z12.31 ENCOUNTER FOR SCREENING MAMMOGRAM FOR MALIGNANT NEOPLASM OF BREAST: ICD-10-CM

## 2023-12-19 DIAGNOSIS — D47.2 MGUS (MONOCLONAL GAMMOPATHY OF UNKNOWN SIGNIFICANCE): ICD-10-CM

## 2023-12-19 LAB
IGA SERPL-MCNC: 92 MG/DL (ref 66–433)
IGG SERPL-MCNC: 1731 MG/DL (ref 635–1741)
IGM SERPL-MCNC: 55 MG/DL (ref 45–281)

## 2023-12-19 PROCEDURE — 86334 IMMUNOFIX E-PHORESIS SERUM: CPT

## 2023-12-19 PROCEDURE — 77067 SCR MAMMO BI INCL CAD: CPT

## 2023-12-19 PROCEDURE — 77080 DXA BONE DENSITY AXIAL: CPT

## 2023-12-19 PROCEDURE — 36415 COLL VENOUS BLD VENIPUNCTURE: CPT

## 2023-12-19 PROCEDURE — 83521 IG LIGHT CHAINS FREE EACH: CPT

## 2023-12-19 PROCEDURE — 84165 PROTEIN E-PHORESIS SERUM: CPT

## 2023-12-19 PROCEDURE — 82784 ASSAY IGA/IGD/IGG/IGM EACH: CPT

## 2023-12-19 PROCEDURE — 77063 BREAST TOMOSYNTHESIS BI: CPT

## 2023-12-19 PROCEDURE — 82232 ASSAY OF BETA-2 PROTEIN: CPT

## 2023-12-20 LAB
ALBUMIN SERPL ELPH-MCNC: 3.79 G/DL (ref 3.2–5.1)
ALBUMIN SERPL ELPH-MCNC: 46.8 % (ref 48–70)
ALPHA1 GLOB SERPL ELPH-MCNC: 0.43 G/DL (ref 0.15–0.47)
ALPHA1 GLOB SERPL ELPH-MCNC: 5.3 % (ref 1.8–7)
ALPHA2 GLOB SERPL ELPH-MCNC: 1.04 G/DL (ref 0.42–1.04)
ALPHA2 GLOB SERPL ELPH-MCNC: 12.8 % (ref 5.9–14.9)
BETA GLOB ABNORMAL SERPL ELPH-MCNC: 0.45 G/DL (ref 0.31–0.57)
BETA1 GLOB SERPL ELPH-MCNC: 5.6 % (ref 4.7–7.7)
BETA2 GLOB SERPL ELPH-MCNC: 7.5 % (ref 3.1–7.9)
BETA2+GAMMA GLOB SERPL ELPH-MCNC: 0.61 G/DL (ref 0.2–0.58)
GAMMA GLOB ABNORMAL SERPL ELPH-MCNC: 1.78 G/DL (ref 0.4–1.66)
GAMMA GLOB SERPL ELPH-MCNC: 22 % (ref 6.9–22.3)
IGG/ALB SER: 0.88 {RATIO} (ref 1.1–1.8)
INTERPRETATION UR IFE-IMP: NORMAL
KAPPA LC FREE SER-MCNC: 44.9 MG/L (ref 3.3–19.4)
KAPPA LC FREE/LAMBDA FREE SER: 2.21 {RATIO} (ref 0.26–1.65)
LAMBDA LC FREE SERPL-MCNC: 20.3 MG/L (ref 5.7–26.3)
M PROTEIN 1 MFR SERPL ELPH: 5.1 %
M PROTEIN 1 SERPL ELPH-MCNC: 0.41 G/DL
PROT PATTERN SERPL ELPH-IMP: ABNORMAL
PROT SERPL-MCNC: 8.1 G/DL (ref 6.4–8.2)

## 2023-12-20 PROCEDURE — 86334 IMMUNOFIX E-PHORESIS SERUM: CPT | Performed by: PATHOLOGY

## 2023-12-20 PROCEDURE — 84165 PROTEIN E-PHORESIS SERUM: CPT | Performed by: PATHOLOGY

## 2023-12-21 LAB — B2 MICROGLOB SERPL-MCNC: 2.1 MG/L (ref 0.6–2.4)

## 2023-12-22 ENCOUNTER — OFFICE VISIT (OUTPATIENT)
Dept: HEMATOLOGY ONCOLOGY | Facility: CLINIC | Age: 73
End: 2023-12-22
Payer: MEDICARE

## 2023-12-22 VITALS
TEMPERATURE: 97.7 F | SYSTOLIC BLOOD PRESSURE: 122 MMHG | OXYGEN SATURATION: 99 % | RESPIRATION RATE: 17 BRPM | BODY MASS INDEX: 27.48 KG/M2 | HEART RATE: 67 BPM | DIASTOLIC BLOOD PRESSURE: 74 MMHG | HEIGHT: 57 IN

## 2023-12-22 DIAGNOSIS — M19.90 ARTHRITIS: ICD-10-CM

## 2023-12-22 DIAGNOSIS — D50.9 IRON DEFICIENCY ANEMIA, UNSPECIFIED IRON DEFICIENCY ANEMIA TYPE: ICD-10-CM

## 2023-12-22 DIAGNOSIS — M54.32 LEFT SIDED SCIATICA: ICD-10-CM

## 2023-12-22 DIAGNOSIS — E55.9 VITAMIN D INSUFFICIENCY: ICD-10-CM

## 2023-12-22 DIAGNOSIS — I10 PRIMARY HYPERTENSION: ICD-10-CM

## 2023-12-22 DIAGNOSIS — D47.2 MGUS (MONOCLONAL GAMMOPATHY OF UNKNOWN SIGNIFICANCE): Primary | ICD-10-CM

## 2023-12-22 DIAGNOSIS — E03.9 ACQUIRED HYPOTHYROIDISM: ICD-10-CM

## 2023-12-22 PROCEDURE — 99214 OFFICE O/P EST MOD 30 MIN: CPT | Performed by: INTERNAL MEDICINE

## 2023-12-22 NOTE — PROGRESS NOTES
HPI:  Patient is here with her .  Patient is in a wheelchair.  She has a walker at home.  Follow-up visit for MGUS, iron deficiency and previously JAK2 negative thrombocytosis.  She has osteoporosis and has been on calcium and vitamin D and Prolia and she will take extra 2000 units of vitamin D3.  She has history of arthritis and has left sciatica and right ankle brace.  These problems are being managed by patient's primary physician.  If left sciatica persisted she will request x-rays and scans for lower back.   For iron deficiency anemia  she had EGD and colonoscopy and had a polyp.  She took 1 iron tablet daily but now she takes it 3 days a week and she will increase that to 5 days a week because hemoglobin is low normal at 11.7 and that is slightly lower than before.  Patient will follow with her gastroenterologist for polyp and iron deficiency anemia.  Referral made.    Has tiredness.  .             Current Outpatient Medications:     amLODIPine (NORVASC) 5 mg tablet, Take 1 tablet (5 mg total) by mouth daily, Disp: 90 tablet, Rfl: 3    baclofen 20 mg tablet, TAKE 1 TABLET BY MOUTH FOUR TIMES DAILY, Disp: 120 tablet, Rfl: 6    Calcium Carbonate-Vitamin D 600-200 MG-UNIT TABS, Take 1 tablet by mouth in the morning, Disp: , Rfl:     celecoxib (CeleBREX) 200 mg capsule, TAKE 1 CAPSULE(200 MG) BY MOUTH DAILY, Disp: 30 capsule, Rfl: 1    Cholecalciferol (VITAMIN D PO), Take 5,000 Units by mouth, Disp: , Rfl:     denosumab (PROLIA) 60 mg/mL, Inject 60 mg under the skin every 6 (six) months, Disp: , Rfl:     diphenoxylate-atropine (LOMOTIL) 2.5-0.025 mg per tablet, TAKE 1 TABLET BY MOUTH FOUR TIMES DAILY AS NEEDED FOR DIARRHEA, Disp: 40 tablet, Rfl: 3    famotidine (PEPCID) 20 mg tablet, Take 1 tablet (20 mg total) by mouth 2 (two) times a day, Disp: 100 tablet, Rfl: 3    Ferrous Sulfate (IRON PO), Take 65 mg by mouth daily, Disp: , Rfl:     levothyroxine 50 mcg tablet, TAKE 1 TABLET BY MOUTH EVERY DAY AS  "DIRECTED, Disp: 30 tablet, Rfl: 5    potassium chloride (MICRO-K) 10 MEQ CR capsule, TAKE 2 CAPSULES BY MOUTH TWICE DAILY AS DIRECTED, Disp: 360 capsule, Rfl: 1    rosuvastatin (CRESTOR) 20 MG tablet, TAKE 1 TABLET BY MOUTH EVERY DAY AS DIRECTED, Disp: 30 tablet, Rfl: 5    telmisartan (MICARDIS) 80 MG tablet, TAKE 1 TABLET(80 MG) BY MOUTH DAILY, Disp: 30 tablet, Rfl: 6    triamcinolone (KENALOG) 0.1 % cream, Apply topically 2 (two) times a day, Disp: 45 g, Rfl: 1    zolpidem (AMBIEN) 5 mg tablet, Take 5 mg by mouth daily at bedtime as needed, Disp: , Rfl:     Allergies   Allergen Reactions    Aspirin Hives    Hydrochlorothiazide Diarrhea    Ibuprofen Hives       Oncology History    No history exists.       ROS:  12/22/23 Reviewed 12 systems: See symptoms in HPI  Presently no  Other neurological, cardiac, pulmonary, GI and  symptoms other than listed above.  No other symptoms like fever, chills, bleeding, bone pains, skin rash, weight loss,   weakness, numbness. No frequent infections.  Not unusually sensitive to heat or cold. No swelling of the ankles. No swollen glands.  Patient is anxious.       /74 (BP Location: Left arm, Patient Position: Sitting, Cuff Size: Adult)   Pulse 67   Temp 97.7 °F (36.5 °C)   Resp 17   Ht 4' 9\" (1.448 m)   SpO2 99%   BMI 27.48 kg/m²     Physical Exam:  Patient is alert and oriented.  Patient is not in distress.  Vitals are above.  There is no icterus.  There is no oral thrush.  There is no palpable neck mass.  Lungs are clear.  Heart rate is regular.  There is no palpable abdominal mass.  Soft and nontender abdomen.  There is no ascites.  There is no edema of the ankles.  There is no calf tenderness.  There is no focal neurological deficit, no skin rash, no palpable lymphadenopathy in the neck and axillary areas,  no clubbing.    Patient is anxious.  Performance status 3.   Right ankle brace.  Patient goes to Dr. Echavarria for gyn examination and " mammography    IMAGING:  IMPRESSION:     Small right hepatic lobe cyst accounting for recent CT finding.  No suspicious hepatic lesions.     Moderately severe pancreatic atrophy.     Otherwise, unremarkable MRI abdomen.              Workstation performed: [id number]      Imaging    MRI abdomen w wo contrast (Order: [id number]) - 9/4/2018      LABS:      Results for orders placed or performed in visit on 12/19/23   IgG, IgA, IgM   Result Value Ref Range    IGA 92 66 - 433 mg/dL    IGG 1,731 635 - 1,741 mg/dL    IGM 55 45 - 281 mg/dL   Beta 2 microglobulin, serum   Result Value Ref Range    Beta-2 Microglobulin 2.1 0.6 - 2.4 mg/L   Immunoglobulin free LT chains blood   Result Value Ref Range    Ig Kappa Free Light Chain 44.9 (H) 3.3 - 19.4 mg/L    Ig Lambda Free Light Chain 20.3 5.7 - 26.3 mg/L    Kappa/Lambda FluidC Ratio 2.21 (H) 0.26 - 1.65   Protein electrophoresis, serum   Result Value Ref Range    A/G Ratio 0.88 (L) 1.10 - 1.80    Albumin Electrophoresis 46.8 (L) 48.0 - 70.0 %    Albumin CONC 3.79 3.20 - 5.10 g/dl    Alpha 1 5.3 1.8 - 7.0 %    ALPHA 1 CONC 0.43 0.15 - 0.47 g/dL    Alpha 2 12.8 5.9 - 14.9 %    ALPHA 2 CONC 1.04 0.42 - 1.04 g/dL    Beta-1 5.6 4.7 - 7.7 %    BETA 1 CONC 0.45 0.31 - 0.57 g/dL    Beta-2 7.5 3.1 - 7.9 %    BETA 2 CONC 0.61 (H) 0.20 - 0.58 g/dL    Gamma Globulin 22.0 6.9 - 22.3 %    GAMMA CONC 1.78 (H) 0.40 - 1.66 g/dL    M Peak ID 1 5.10 %    M PEAK 1 CONC 0.41 g/dL    Total Protein 8.1 6.4 - 8.2 g/dL    SPEP Interpretation See Comment    Immunofixation, Serum(Reflex Only-Do Not Order)   Result Value Ref Range    Immunofixation Interpretation See Comment      Labs, Imaging, & Other studies:   All pertinent labs and imaging studies were personally reviewed    Lab Results   Component Value Date    K 4.1 11/22/2023     11/22/2023    CO2 22 11/22/2023    BUN 23 11/22/2023    CREATININE 0.64 11/22/2023    GLUF 78 11/22/2023    CALCIUM 9.4 11/22/2023    CORRECTEDCA 9.6 04/13/2022     AST 14 11/22/2023    ALT 9 11/22/2023    ALKPHOS 67 11/22/2023    EGFR 90 11/22/2023     Lab Results   Component Value Date    WBC 5.97 11/22/2023    HGB 11.7 11/22/2023    HCT 38.2 11/22/2023    MCV 96 11/22/2023     (H) 11/22/2023   .  Normal differential count.    Protein electrophoresis, serum  Status: Final result      Contains abnormal data Protein electrophoresis, serum  Order: [id number]  Status: Final result       Visible to patient: Yes (seen)       Next appt: 02/05/2024 at 11:00 AM in Endocrinology (Antonio Ribeiro MD)       Dx: MGUS (monoclonal gammopathy of unknow...    0 Result Notes            Component  Ref Range & Units 12/19/23  9:40 AM 11/22/23 10:05 AM 10/20/22 11:17 AM 10/20/22 11:17 AM 4/13/22 11:10 AM 11/22/21 10:13 AM 9/29/21 10:42 AM   A/G Ratio  1.10 - 1.80 0.88 Low   1.04 Low        Albumin Electrophoresis  48.0 - 70.0 % 46.8 Low   50.9 Low  R       Albumin CONC  3.20 - 5.10 g/dl 3.79  3.87 R       Alpha 1  1.8 - 7.0 % 5.3  5.3 High  R       ALPHA 1 CONC  0.15 - 0.47 g/dL 0.43  0.40 R       Alpha 2  5.9 - 14.9 % 12.8  14.5 High  R       ALPHA 2 CONC  0.42 - 1.04 g/dL 1.04  1.10 R       Beta-1  4.7 - 7.7 % 5.6  5.9 R       BETA 1 CONC  0.31 - 0.57 g/dL 0.45  0.45 R       Beta-2  3.1 - 7.9 % 7.5  7.3 R       BETA 2 CONC  0.20 - 0.58 g/dL 0.61 High   0.55 High  R       Gamma Globulin  6.9 - 22.3 % 22.0  16.1 R       GAMMA CONC  0.40 - 1.66 g/dL 1.78 High   1.22 R       M Peak ID 1  % 5.10  6.10       M PEAK 1 CONC  g/dL 0.41  0.46       Total Protein  6.4 - 8.2 g/dL 8.1 7.6 R 7.6 7.3 R 7.4 7.9 7.9   SPEP Interpretation See Comment  See Comment CM       Comment: The SPEP shows a monoclonal peak in the gamma region. Previous immunofixation 10/20/22 identified the monoclonal peak as IgG kappa.Repeat immunofixation to be performed. Reviewed by:  Karly Chery MD  **Electronic Signature**      Result Notes       Component  Ref Range & Units 12/19/23  9:40 AM 11/10/22 11:28 AM   Ig Kappa Free  Light Chain  3.3 - 19.4 mg/L 44.9 High  38.4 High    Ig Lambda Free Light Chain  5.7 - 26.3 mg/L 20.3 23.7   Kappa/Lambda FluidC Ratio  0.26 - 1.65 2.21 High  1.62             Component  Ref Range & Units 12/19/23  9:40 AM 11/10/22 11:28 AM   Beta-2 Microglobulin  0.6 - 2.4 mg/L 2.1 3.5 High  CM   Comment: Siemens "[employer]ulite 2000 Immunochemiluminometric assay (ICMA)  Values obtained with different assay methods or kits cannot be used  interchangeably. Results cannot be interpreted as absolute evidence  of the presence or absence of malignant disease.      Result Notes      Component  Ref Range & Units 12/19/23  9:40 AM   IGA  66 - 433 mg/dL 92   IGG  635 - 1,741 mg/dL 1,731   IGM  45 - 281 mg/dL 55              Specimen Collected: 12/19/23  9:40 AM                                     .  Reviewed test results.  Assessment and plan:    Patient is here with her .  Patient is in a wheelchair.  She has a walker at home.  Follow-up visit for MGUS, iron deficiency and previously JAK2 negative thrombocytosis.  She has osteoporosis and has been on calcium and vitamin D and Prolia and she will take extra 2000 units of vitamin D3.  She has history of arthritis and has left sciatica and right ankle brace.  These problems are being managed by patient's primary physician.  If left sciatica persisted she will request x-rays and scans for lower back.   For iron deficiency anemia  she had EGD and colonoscopy and had a polyp.  She took 1 iron tablet daily but now she takes it 3 days a week and she will increase that to 5 days a week because hemoglobin is low normal at 11.7 and that is slightly lower than before.  Patient will follow with her gastroenterologist for polyp and iron deficiency anemia.  Referral made.    Has tiredness.  .         .  Physical examination and test results are as recorded and discussed.  Gammopathy parameters will be monitored.  Explained MGUS in detail.  Blood counts are being monitored.   Patient  states she is up-to-date with her medical checkups and health screening tests including gyn examination and mammography.  Referral to GI.  Increasing iron tablets to 5 days a week.  Adding 2000 units of vitamin D3 daily.  See above.  All discussed in detail.  Questions answered.  Discussed the importance of self-breast examination, eating healthy foods, staying active and health screening test.  Patient needs some assistance in her care.  Discussed precautions against coronavirus.  Patient goes to Dr. Echavarria for gyn examination and mammography  .  Patient will continue to follow with her primary physician and other consultants.  See diagnoses and orders below.    1. MGUS (monoclonal gammopathy of unknown significance)    - CBC and differential; Future  - Comprehensive metabolic panel; Future  - IgG, IgA, IgM; Future  - Immunoglobulin free LT chains blood; Future  - Protein electrophoresis, serum; Future  - Ferritin; Future    2. Vitamin D insufficiency      3. Acquired hypothyroidism      4. Primary hypertension      5. Left sided sciatica      6. Arthritis      7. Iron deficiency anemia, unspecified iron deficiency anemia type    - CBC and differential; Future  - Iron Panel (Includes Ferritin, Iron Sat%, Iron, and TIBC); Future  - Reticulocytes; Future  - Ferritin; Future    Blood counts and iron studies in 3 months.  Other blood work prior to next visit in 6 months.  Please take extra  vitamin D3 2000 units daily.  Follow-up with your GI doctor for history of colon polyp and iron deficiency.    Patient voiced understanding and agrees      Counseling / Coordination of Care  .  Provided counseling and support

## 2023-12-22 NOTE — PATIENT INSTRUCTIONS
Blood counts and iron studies in 3 months.  Other blood work prior to next visit in 6 months.  Please take extra wind vitamin D3 2000 units daily.  Follow-up with your GI doctor for history of colon polyp and iron deficiency.

## 2023-12-28 DIAGNOSIS — I10 ESSENTIAL HYPERTENSION: ICD-10-CM

## 2023-12-28 RX ORDER — TELMISARTAN 80 MG/1
TABLET ORAL
Qty: 30 TABLET | Refills: 5 | Status: SHIPPED | OUTPATIENT
Start: 2023-12-28

## 2024-01-22 ENCOUNTER — NURSE TRIAGE (OUTPATIENT)
Age: 74
End: 2024-01-22

## 2024-01-22 NOTE — TELEPHONE ENCOUNTER
I offered pt multiple appts this week to be seen, however she declined them stating that her  was working. I also offered for her to see Estela, but she declined that as well. I scheduled her for Mon, 1/29 with Dr. Ga. Please let me know if this is appropriate? Pt states that this has been going for a few weeks.

## 2024-01-22 NOTE — TELEPHONE ENCOUNTER
"    Patient c/o left hand numbness that started 2 weeks ago. She states the numbness in in the first three finger on the left hand. She states this has not happened before. Denies injury. She is still able to use fingers normally.  She is requesting appointment with Dr. Ga this week, please call back to schedule.   Reason for Disposition   [1] Numbness (i.e., loss of sensation) in hand or fingers AND [2] new-onset    Answer Assessment - Initial Assessment Questions  1. ONSET: \"When did the pain start?\"       Past two weeks    2. LOCATION and RADIATION: \"Where is the pain located?\"  (e.g., fingertip, around nail, joint, entire   finger)       First three fingers        5. WORK OR EXERCISE: \"Has there been any recent work or exercise that involved this part of the body?\"      Denies    6. CAUSE: \"What do you think is causing the pain?\"      Unknown    8. OTHER SYMPTOMS: \"Do you have any other symptoms?\" (e.g., fever, neck pain, numbness)      Finger numbness    9. PREGNANCY: \"Is there any chance you are pregnant?\" \"When was your last menstrual period?\"      N/A    Protocols used: Finger Pain-ADULT-AH    "

## 2024-01-26 ENCOUNTER — TELEPHONE (OUTPATIENT)
Dept: OTHER | Facility: OTHER | Age: 74
End: 2024-01-26

## 2024-01-26 NOTE — TELEPHONE ENCOUNTER
Patient didn't keep her appointment for numbness in her fingers which was yesterday. No available appointment today and next week. Triage RN was unable to reach out to the office. Patient stated that she would see Dr. Ga in March. Triage RN attempted to reassure the patient that she needs to be seen as soon as possible, patient hung up the phone. Please follow up.

## 2024-01-29 NOTE — TELEPHONE ENCOUNTER
Patient got transferred back to access center instead of the office, tried to reach out to the office but no answer, she wants to confirm that she will be there tomorrow for the appointment.

## 2024-01-29 NOTE — TELEPHONE ENCOUNTER
Left detailed message for pt to please call the office to let us know if she can make OV @ noon on Tues, 1/30.    [facility]-If pt is agreeable, please warm transfer to office so we may put her on the schedule. Thank you.

## 2024-01-30 ENCOUNTER — APPOINTMENT (OUTPATIENT)
Dept: LAB | Facility: CLINIC | Age: 74
End: 2024-01-30
Payer: MEDICARE

## 2024-01-30 ENCOUNTER — OFFICE VISIT (OUTPATIENT)
Dept: FAMILY MEDICINE CLINIC | Facility: CLINIC | Age: 74
End: 2024-01-30
Payer: MEDICARE

## 2024-01-30 VITALS
SYSTOLIC BLOOD PRESSURE: 126 MMHG | DIASTOLIC BLOOD PRESSURE: 70 MMHG | BODY MASS INDEX: 27.4 KG/M2 | WEIGHT: 127 LBS | OXYGEN SATURATION: 97 % | HEART RATE: 64 BPM | HEIGHT: 57 IN | TEMPERATURE: 98.3 F

## 2024-01-30 DIAGNOSIS — M19.90 ARTHRITIS: ICD-10-CM

## 2024-01-30 DIAGNOSIS — Z79.899 MEDICATION MANAGEMENT: ICD-10-CM

## 2024-01-30 DIAGNOSIS — I10 ESSENTIAL HYPERTENSION: ICD-10-CM

## 2024-01-30 DIAGNOSIS — G62.9 NEUROPATHY: Primary | ICD-10-CM

## 2024-01-30 LAB
25(OH)D3 SERPL-MCNC: 30.6 NG/ML (ref 30–100)
ALBUMIN SERPL BCP-MCNC: 4 G/DL (ref 3.5–5)
ALP SERPL-CCNC: 86 U/L (ref 34–104)
ALT SERPL W P-5'-P-CCNC: 10 U/L (ref 7–52)
ANION GAP SERPL CALCULATED.3IONS-SCNC: 9 MMOL/L
AST SERPL W P-5'-P-CCNC: 14 U/L (ref 13–39)
BASOPHILS # BLD AUTO: 0.05 THOUSANDS/ÂΜL (ref 0–0.1)
BASOPHILS NFR BLD AUTO: 1 % (ref 0–1)
BILIRUB SERPL-MCNC: 0.52 MG/DL (ref 0.2–1)
BUN SERPL-MCNC: 17 MG/DL (ref 5–25)
CALCIUM SERPL-MCNC: 10.1 MG/DL (ref 8.4–10.2)
CHLORIDE SERPL-SCNC: 106 MMOL/L (ref 96–108)
CO2 SERPL-SCNC: 26 MMOL/L (ref 21–32)
CREAT SERPL-MCNC: 0.76 MG/DL (ref 0.6–1.3)
EOSINOPHIL # BLD AUTO: 0.63 THOUSAND/ÂΜL (ref 0–0.61)
EOSINOPHIL NFR BLD AUTO: 13 % (ref 0–6)
ERYTHROCYTE [DISTWIDTH] IN BLOOD BY AUTOMATED COUNT: 13.8 % (ref 11.6–15.1)
GFR SERPL CREATININE-BSD FRML MDRD: 79 ML/MIN/1.73SQ M
GLUCOSE P FAST SERPL-MCNC: 100 MG/DL (ref 65–99)
HCT VFR BLD AUTO: 41.7 % (ref 34.8–46.1)
HGB BLD-MCNC: 12.9 G/DL (ref 11.5–15.4)
IMM GRANULOCYTES # BLD AUTO: 0.01 THOUSAND/UL (ref 0–0.2)
IMM GRANULOCYTES NFR BLD AUTO: 0 % (ref 0–2)
LYMPHOCYTES # BLD AUTO: 0.75 THOUSANDS/ÂΜL (ref 0.6–4.47)
LYMPHOCYTES NFR BLD AUTO: 16 % (ref 14–44)
MCH RBC QN AUTO: 29.4 PG (ref 26.8–34.3)
MCHC RBC AUTO-ENTMCNC: 30.9 G/DL (ref 31.4–37.4)
MCV RBC AUTO: 95 FL (ref 82–98)
MONOCYTES # BLD AUTO: 0.38 THOUSAND/ÂΜL (ref 0.17–1.22)
MONOCYTES NFR BLD AUTO: 8 % (ref 4–12)
NEUTROPHILS # BLD AUTO: 3.03 THOUSANDS/ÂΜL (ref 1.85–7.62)
NEUTS SEG NFR BLD AUTO: 62 % (ref 43–75)
NRBC BLD AUTO-RTO: 0 /100 WBCS
PHOSPHATE SERPL-MCNC: 3.9 MG/DL (ref 2.3–4.1)
PLATELET # BLD AUTO: 382 THOUSANDS/UL (ref 149–390)
PMV BLD AUTO: 9.1 FL (ref 8.9–12.7)
POTASSIUM SERPL-SCNC: 4.1 MMOL/L (ref 3.5–5.3)
PROT SERPL-MCNC: 7.7 G/DL (ref 6.4–8.4)
PTH-INTACT SERPL-MCNC: 48.5 PG/ML (ref 12–88)
RBC # BLD AUTO: 4.39 MILLION/UL (ref 3.81–5.12)
SODIUM SERPL-SCNC: 141 MMOL/L (ref 135–147)
WBC # BLD AUTO: 4.85 THOUSAND/UL (ref 4.31–10.16)

## 2024-01-30 PROCEDURE — 99213 OFFICE O/P EST LOW 20 MIN: CPT | Performed by: FAMILY MEDICINE

## 2024-01-30 PROCEDURE — 85025 COMPLETE CBC W/AUTO DIFF WBC: CPT

## 2024-01-30 NOTE — PROGRESS NOTES
Assessment/Plan:          1. Neuropathy  Assessment & Plan:  The rheumatologist wants to inject what could possibly be a ganglion associated with arthritis. She will wear a hand wrist splint for the next month or so. If the condition worsens, she will inform me, and we will conduct an EMG study. She had an EMG study years ago and is not keen on repeating it now. I suggested trying high vitamin therapy. We will follow up accordingly.      2. Arthritis  Assessment & Plan:  She continues to follow up with the rheumatologist.      3. Essential hypertension  Assessment & Plan:  Blood pressure is 126/70 mmHg.      4. Medication management  Assessment & Plan:  All medications were reviewed and discussed for appropriateness, safety, efficacy, and tolerance.          She has an appointment coming up at the end of 03/2024, so we will sit tight until then.                  Subjective:       Patient ID: Olivia Reid is a 73 y.o. female who presents for evaluation of left-hand neuropathy. She is accompanied by an adult male, named Newton.    The patient denies the possibility of carpal tunnel syndrome, as she experiences no pain in her thumb or wrist. However, she has been experiencing numbness in 3 of her fingers for the past 2 weeks, primarily in the index and middle fingers, with some numbness extending to the ring finger (4th digit). She has not baked cookies or rolled dough since 12/2023; the last time she did was on 12/25/2023. She was informed it was arthritis. The swelling does not bother her unless she bumps into it. She is not brave enough yet for injection. The numbness in her left hand does not disrupt her sleep and is not exacerbated upon waking in the morning. The condition remains unchanged, regardless of whether she is wearing hand braces or not.    The following portions of the patient's history were reviewed and updated as appropriate: allergies, current medications, past family history, past medical  "history, past social history, past surgical history, and problem list.      Review of systems  Left hand: Neither cold nor hot sensations were noted. The patient tested positive for numbness, primarily in 2 fingers: the index and the middle finger. Some numbness extended to the ring finger, which is the 4th digit. The pulses are strong. There is a large arthritic nodule on the dorsal aspect of the hand, and there may be a partial effusion.        Objective:       /70 (BP Location: Left arm, Patient Position: Sitting, Cuff Size: Standard)   Pulse 64   Temp 98.3 °F (36.8 °C) (Temporal)   Ht 4' 9\" (1.448 m)   Wt 57.6 kg (127 lb)   SpO2 97%   BMI 27.48 kg/m²          Physical Exam  Vitals and nursing note reviewed.  Constitutional:       General: She is not in acute distress.     Appearance: Normal appearance. She is well-developed.   HENT:      Head: Normocephalic and atraumatic.   Eyes:      General:         Right eye: No discharge.         Left eye: No discharge.   Neck:      Thyroid: No thyromegaly.   Cardiovascular:      Rate and Rhythm: Normal rate and regular rhythm.      Pulses: Normal pulses.      Heart sounds: Normal heart sounds. No murmur heard.  Pulmonary:      Effort: Pulmonary effort is normal.      Breath sounds: Normal breath sounds. No wheezing or rhonchi.   Musculoskeletal:      Cervical back: Neck supple.      Right lower leg: No edema.      Left lower leg: No edema.     Left hand:        Temperature: The hand is neither cold nor hot.         Numbness: Primarily present in the index finger, middle finger, and ring finger (4th digit).         Pulses: Good.         Observations: A large arthritic nodule is present on the dorsal aspect of the hand. There may be a partial effusion.  Lymphadenopathy:      Cervical: No cervical adenopathy.   Skin:     General: Skin is warm.      Capillary Refill: Capillary refill takes less than 2 seconds.   Neurological:      General: No focal deficit present. "      Mental Status: She is alert and oriented to person, place, and time.   Psychiatric:         Mood and Affect: Mood normal.         Behavior: Behavior normal.         Thought Content: Thought content normal.        I personally reviewed the recent (and prior)  lab results, the image studies, pathology, other specialty/physicians consult notes and recommendations, and outside medical records from other institutions, as appropriate. I had a lengthy discussion with the patient and shared the work-up findings. We discussed the diagnosis and management plan.  I spent  25  minutes reviewing the records (labs, clinician notes, outside records, medical history, ordering medicine/tests/procedures, interpreting the imaging/labs previously done) and coordination of care as well as direct time with the patient today, of which greater than 50% of the time was spent in counseling and coordination of care with the patient/family.    Transcribed for GLORIA Ga DO, by Adrianna Kumar on 01/31/24 at 8:29 AM. Powered by [employer].

## 2024-01-30 NOTE — ASSESSMENT & PLAN NOTE
The rheumatologist wants to inject what could possibly be a ganglion associated with arthritis. She will wear a hand wrist splint for the next month or so. If the condition worsens, she will inform me, and we will conduct an EMG study. She had an EMG study years ago and is not keen on repeating it now. I suggested trying high vitamin therapy. We will follow up accordingly.

## 2024-02-04 DIAGNOSIS — E03.9 HYPOTHYROIDISM, UNSPECIFIED TYPE: ICD-10-CM

## 2024-02-04 DIAGNOSIS — E78.2 MIXED HYPERLIPIDEMIA: ICD-10-CM

## 2024-02-04 DIAGNOSIS — M19.90 ARTHRITIS: ICD-10-CM

## 2024-02-04 RX ORDER — ROSUVASTATIN CALCIUM 20 MG/1
TABLET, COATED ORAL
Qty: 90 TABLET | Refills: 0 | Status: SHIPPED | OUTPATIENT
Start: 2024-02-04

## 2024-02-04 RX ORDER — LEVOTHYROXINE SODIUM 0.05 MG/1
TABLET ORAL
Qty: 90 TABLET | Refills: 0 | Status: SHIPPED | OUTPATIENT
Start: 2024-02-04

## 2024-02-04 RX ORDER — CELECOXIB 200 MG/1
CAPSULE ORAL
Qty: 30 CAPSULE | Refills: 1 | Status: SHIPPED | OUTPATIENT
Start: 2024-02-04

## 2024-02-05 ENCOUNTER — OFFICE VISIT (OUTPATIENT)
Dept: ENDOCRINOLOGY | Facility: CLINIC | Age: 74
End: 2024-02-05
Payer: MEDICARE

## 2024-02-05 VITALS
OXYGEN SATURATION: 99 % | DIASTOLIC BLOOD PRESSURE: 68 MMHG | TEMPERATURE: 97.7 F | SYSTOLIC BLOOD PRESSURE: 122 MMHG | WEIGHT: 127.1 LBS | RESPIRATION RATE: 12 BRPM | HEART RATE: 59 BPM | BODY MASS INDEX: 27.42 KG/M2 | HEIGHT: 57 IN

## 2024-02-05 DIAGNOSIS — E06.3 HYPOTHYROIDISM DUE TO HASHIMOTO'S THYROIDITIS: ICD-10-CM

## 2024-02-05 DIAGNOSIS — E55.9 VITAMIN D INSUFFICIENCY: ICD-10-CM

## 2024-02-05 DIAGNOSIS — E78.2 MIXED HYPERLIPIDEMIA: ICD-10-CM

## 2024-02-05 DIAGNOSIS — E21.3 HYPERPARATHYROIDISM (HCC): Primary | ICD-10-CM

## 2024-02-05 DIAGNOSIS — E03.8 HYPOTHYROIDISM DUE TO HASHIMOTO'S THYROIDITIS: ICD-10-CM

## 2024-02-05 PROCEDURE — 99214 OFFICE O/P EST MOD 30 MIN: CPT | Performed by: INTERNAL MEDICINE

## 2024-02-05 NOTE — ASSESSMENT & PLAN NOTE
She probably has secondary hyperparathyroidism that improved with vitamin D plus calcium replacement and increased mobility.  Recent PTH was 48pg/mL and calcium/vot D were normal.    Se is on prolia through PCP office for osteoporosis.    Today we discussed options and suggested follow up only as needed. May repeat PTH, calcium and vitamin D labs in 6-12 months.  May follow with PCP.

## 2024-02-05 NOTE — PROGRESS NOTES
"  Follow-up Patient Progress Note      CC: hyperparathyroidism.     History of Present Illness:   71 yr female with hypothyroidism, HTN, severe HLD, DJD spine, osteoporosis on prolia since 2015, PMR, Anemia, Essential thrombocytosis, obesity and hyperparathyroidism. Last visit was 8/4/23.     Since last visit, weight has been stable. Mobility has improved and patient is doing well with walker.     She had HCTZ related hypercalcemia - now normocalcemic.     5/17/2021: DXA showed severe osteoporosis with hip T scores -2.4 LS, -3.2 LTH, -3.2 LFN and -0.7 lt forearm. 10yr FRAX score 7.4% for hip fracture and 20.5% major osteoporotic fracture.  Due for prolia  2/19/23 via PCP office.     Statin: Crestor 20     Physical Exam:  Body mass index is 27.5 kg/m².  /68 (BP Location: Left arm, Patient Position: Sitting, Cuff Size: Adult)   Pulse 59   Temp 97.7 °F (36.5 °C) (Tympanic)   Resp 12   Ht 4' 9\" (1.448 m)   Wt 57.7 kg (127 lb 1.6 oz) Comment: pt spouse reported, unable to weigh due to unsteady gait  SpO2 99%   BMI 27.50 kg/m²    Vitals:    02/05/24 1049   Weight: 57.7 kg (127 lb 1.6 oz)        Physical Exam  Constitutional:       General: She is not in acute distress.     Appearance: She is well-developed. She is not ill-appearing.   HENT:      Head: Normocephalic and atraumatic.      Nose: Nose normal.      Mouth/Throat:      Pharynx: Oropharynx is clear.   Eyes:      Extraocular Movements: Extraocular movements intact.      Conjunctiva/sclera: Conjunctivae normal.   Neck:      Thyroid: No thyromegaly.   Cardiovascular:      Rate and Rhythm: Normal rate.   Pulmonary:      Effort: Pulmonary effort is normal.   Musculoskeletal:         General: No deformity.      Cervical back: Normal range of motion.   Skin:     Capillary Refill: Capillary refill takes less than 2 seconds.      Coloration: Skin is not pale.      Findings: No rash.   Neurological:      Mental Status: She is alert and oriented to person, " place, and time.   Psychiatric:         Behavior: Behavior normal.         Labs:   Lab Results   Component Value Date    HGBA1C 5.9 (H) 10/18/2023       Lab Results   Component Value Date    CPM9CJDFAVVR 2.950 11/22/2023       Lab Results   Component Value Date    CREATININE 0.76 01/30/2024    CREATININE 0.64 11/22/2023    CREATININE 0.71 10/20/2022    BUN 17 01/30/2024    K 4.1 01/30/2024     01/30/2024    CO2 26 01/30/2024     eGFR   Date Value Ref Range Status   01/30/2024 79 ml/min/1.73sq m Final       Lab Results   Component Value Date    ALT 10 01/30/2024    AST 14 01/30/2024    ALKPHOS 86 01/30/2024       Lab Results   Component Value Date    CHOLESTEROL 135 11/22/2023    CHOLESTEROL 161 04/13/2022    CHOLESTEROL 173 11/22/2021     Lab Results   Component Value Date    HDL 55 11/22/2023    HDL 63 04/13/2022    HDL 64 11/22/2021     Lab Results   Component Value Date    TRIG 62 11/22/2023    TRIG 65 04/13/2022    TRIG 86 11/22/2021     Lab Results   Component Value Date    NONHDLC 80 11/22/2023    NONHDLC 98 04/13/2022    NONHDLC 109 11/22/2021         Assessment/Plan:    Problem List Items Addressed This Visit          Endocrine    Hyperparathyroidism (HCC) - Primary     She probably has secondary hyperparathyroidism that improved with vitamin D plus calcium replacement and increased mobility.  Recent PTH was 48pg/mL and calcium/vot D were normal.     is on prolia through PCP office for osteoporosis.    Today we discussed options and suggested follow up only as needed. May repeat PTH, calcium and vitamin D labs in 6-12 months.  May follow with PCP.         Hypothyroidism     She seems clinically and biochemically euthyroid.  Continue levothyroxine 50mcg qdaily.            Other    Vitamin D insufficiency     Plan as listed above.         Mixed hyperlipidemia     ASCVD 12%. Continue crestor.              I have spent a total time of 31 minutes on 02/05/24 in caring for this patient including [facility]  than 50% of this time was spent in counseling/coordination of care as listed above.       Discussed with the patient and all questioned fully answered. She will contact me with concerns.    Antonio Ribeiro

## 2024-02-15 ENCOUNTER — TELEPHONE (OUTPATIENT)
Age: 74
End: 2024-02-15

## 2024-02-15 NOTE — TELEPHONE ENCOUNTER
Patient calling because two days ago she started coughing and has congestion in chest and runny nose, denies fever. Patient would like something called to the pharmacy.- [facility].   Please call patient when message is sent.  Kathryn Koenig

## 2024-02-16 NOTE — TELEPHONE ENCOUNTER
Called and spoke w pt -- she reports she has nonstop cough that began on Tuesday. Has been taking dayquil and nyquil but they have not helped. Pt reports she does not have a fever, but does have a lot of chest congestion. She states the mucous from her cough is a yellowish color.     Due to lack of availability at office today-- referred pt to urgent care. Pt was agreeable and will try to go tonight. I offered to call  to set up appt for pt - pt declined as she states it is usually not that long a wait. Pt will f/u as needed.

## 2024-02-18 ENCOUNTER — OFFICE VISIT (OUTPATIENT)
Dept: URGENT CARE | Facility: CLINIC | Age: 74
End: 2024-02-18
Payer: MEDICARE

## 2024-02-18 VITALS
DIASTOLIC BLOOD PRESSURE: 59 MMHG | OXYGEN SATURATION: 98 % | SYSTOLIC BLOOD PRESSURE: 122 MMHG | HEART RATE: 68 BPM | RESPIRATION RATE: 20 BRPM | TEMPERATURE: 97.1 F

## 2024-02-18 DIAGNOSIS — R05.1 ACUTE COUGH: Primary | ICD-10-CM

## 2024-02-18 PROCEDURE — G0463 HOSPITAL OUTPT CLINIC VISIT: HCPCS | Performed by: NURSE PRACTITIONER

## 2024-02-18 PROCEDURE — 99213 OFFICE O/P EST LOW 20 MIN: CPT | Performed by: NURSE PRACTITIONER

## 2024-02-18 RX ORDER — BENZONATATE 100 MG/1
100 CAPSULE ORAL 3 TIMES DAILY PRN
Qty: 20 CAPSULE | Refills: 0 | Status: SHIPPED | OUTPATIENT
Start: 2024-02-18

## 2024-02-18 NOTE — PROGRESS NOTES
[facility]        NAME: Olivia Reid is a 74 y.o. female  : 1950    MRN: [id number]  DATE: 2024  TIME: 12:03 PM    Assessment and Plan   Acute cough [R05.1]  1. Acute cough  benzonatate (TESSALON PERLES) 100 mg capsule            Patient Instructions       Follow up with PCP in 3-5 days.  Proceed to  ER if symptoms worsen.    Chief Complaint     Chief Complaint   Patient presents with    Cold Like Symptoms     Started several days ago with URI sx, coughing with chest discomfort. No SOB Spoke to her doctor requesting cough med. He said she couldn't get anything unless she was tested for covid Flu and RSV         History of Present Illness       Patient is a 74-year-old female presenting with 6 days of dry, nonproductive cough.  Denies fever, chills, ear pain, sore throat, or shortness of breath.  He has been taking DayQuil and NyQuil with some relief.        Review of Systems   Review of Systems   Constitutional:  Negative for activity change, chills and fever.   HENT:  Negative for congestion, ear pain, rhinorrhea and sore throat.    Respiratory:  Positive for cough. Negative for chest tightness and shortness of breath.    Neurological:  Negative for headaches.         Current Medications       Current Outpatient Medications:     amLODIPine (NORVASC) 5 mg tablet, Take 1 tablet (5 mg total) by mouth daily, Disp: 90 tablet, Rfl: 3    baclofen 20 mg tablet, TAKE 1 TABLET BY MOUTH FOUR TIMES DAILY, Disp: 120 tablet, Rfl: 6    benzonatate (TESSALON PERLES) 100 mg capsule, Take 1 capsule (100 mg total) by mouth 3 (three) times a day as needed for cough, Disp: 20 capsule, Rfl: 0    Calcium Carbonate-Vitamin D 600-200 MG-UNIT TABS, Take 1 tablet by mouth in the morning, Disp: , Rfl:     celecoxib (CeleBREX) 200 mg capsule, TAKE 1 CAPSULE(200 MG) BY MOUTH DAILY, Disp: 30 capsule, Rfl: 1    Cholecalciferol (VITAMIN D PO), Take 3,000 Units by mouth, Disp: , Rfl:     denosumab (PROLIA) 60  mg/mL, Inject 60 mg under the skin every 6 (six) months, Disp: , Rfl:     diphenoxylate-atropine (LOMOTIL) 2.5-0.025 mg per tablet, TAKE 1 TABLET BY MOUTH FOUR TIMES DAILY AS NEEDED FOR DIARRHEA, Disp: 40 tablet, Rfl: 3    famotidine (PEPCID) 20 mg tablet, Take 1 tablet (20 mg total) by mouth 2 (two) times a day, Disp: 100 tablet, Rfl: 3    Ferrous Sulfate (IRON PO), Take 65 mg by mouth daily, Disp: , Rfl:     levothyroxine 50 mcg tablet, TAKE 1 TABLET BY MOUTH EVERY DAY AS DIRECTED, Disp: 90 tablet, Rfl: 0    potassium chloride (MICRO-K) 10 MEQ CR capsule, TAKE 2 CAPSULES BY MOUTH TWICE DAILY AS DIRECTED, Disp: 360 capsule, Rfl: 1    rosuvastatin (CRESTOR) 20 MG tablet, TAKE 1 TABLET BY MOUTH EVERY DAY AS DIRECTED, Disp: 90 tablet, Rfl: 0    telmisartan (MICARDIS) 80 MG tablet, TAKE 1 TABLET(80 MG) BY MOUTH DAILY, Disp: 30 tablet, Rfl: 5    triamcinolone (KENALOG) 0.1 % cream, Apply topically 2 (two) times a day, Disp: 45 g, Rfl: 1    zolpidem (AMBIEN) 5 mg tablet, Take 5 mg by mouth daily at bedtime as needed, Disp: , Rfl:     Current Allergies     Allergies as of 02/18/2024 - Reviewed 02/18/2024   Allergen Reaction Noted    Aspirin Hives 11/23/2016    Hydrochlorothiazide Diarrhea 11/20/2023    Ibuprofen Hives 11/23/2016            The following portions of the patient's history were reviewed and updated as appropriate: allergies, current medications, past family history, past medical history, past social history, past surgical history and problem list.     Past Medical History:   Diagnosis Date    Anemia     Arthritis     GERD (gastroesophageal reflux disease)     Hernia, umbilical     Hypertension     Spinal stenosis        Past Surgical History:   Procedure Laterality Date    BREAST CYST EXCISION Left 1998    benign    FOOT SURGERY      Toe    HERNIA REPAIR      HYSTERECTOMY      MAMMO (HISTORICAL)  2017    TONSILLECTOMY         Family History   Problem Relation Age of Onset    Hypertension Mother     Cancer  Father     Colon cancer Father 57    No Known Problems Sister     Breast cancer Maternal Grandmother         not sure of her age of onset    No Known Problems Sister     No Known Problems Maternal Aunt     No Known Problems Maternal Aunt     No Known Problems Maternal Aunt     No Known Problems Maternal Aunt     No Known Problems Paternal Aunt     No Known Problems Paternal Aunt          Medications have been verified.        Objective   /59   Pulse 68   Temp (!) 97.1 °F (36.2 °C) (Temporal)   Resp 20   SpO2 98%        Physical Exam     Physical Exam  Vitals reviewed.   Constitutional:       General: She is awake. She is not in acute distress.     Appearance: Normal appearance. She is normal weight. She is not ill-appearing or toxic-appearing.   HENT:      Head: Normocephalic.      Right Ear: Hearing normal.      Left Ear: Hearing normal.   Cardiovascular:      Rate and Rhythm: Normal rate and regular rhythm.      Heart sounds: Normal heart sounds, S1 normal and S2 normal.   Pulmonary:      Effort: Pulmonary effort is normal.      Breath sounds: Normal breath sounds. No decreased breath sounds, wheezing, rhonchi or rales.      Comments: No cough heard during entire visit.  Speaking in full sentences.  Nonlabored breathing.  Skin:     General: Skin is warm and moist.   Neurological:      General: No focal deficit present.      Mental Status: She is alert, oriented to person, place, and time and easily aroused.   Psychiatric:         Behavior: Behavior is cooperative.

## 2024-02-19 ENCOUNTER — CLINICAL SUPPORT (OUTPATIENT)
Dept: FAMILY MEDICINE CLINIC | Facility: CLINIC | Age: 74
End: 2024-02-19
Payer: MEDICARE

## 2024-02-19 DIAGNOSIS — M81.0 AGE-RELATED OSTEOPOROSIS WITHOUT CURRENT PATHOLOGICAL FRACTURE: Primary | ICD-10-CM

## 2024-02-19 PROCEDURE — 96372 THER/PROPH/DIAG INJ SC/IM: CPT

## 2024-03-03 DIAGNOSIS — I10 ESSENTIAL HYPERTENSION: ICD-10-CM

## 2024-03-03 RX ORDER — TELMISARTAN 80 MG/1
TABLET ORAL
Qty: 90 TABLET | Refills: 0 | Status: SHIPPED | OUTPATIENT
Start: 2024-03-03

## 2024-03-11 ENCOUNTER — APPOINTMENT (OUTPATIENT)
Dept: LAB | Facility: CLINIC | Age: 74
End: 2024-03-11
Payer: MEDICARE

## 2024-03-11 DIAGNOSIS — D50.9 IRON DEFICIENCY ANEMIA, UNSPECIFIED IRON DEFICIENCY ANEMIA TYPE: ICD-10-CM

## 2024-03-11 LAB
BASOPHILS # BLD AUTO: 0.04 THOUSANDS/ÂΜL (ref 0–0.1)
BASOPHILS NFR BLD AUTO: 1 % (ref 0–1)
EOSINOPHIL # BLD AUTO: 0.95 THOUSAND/ÂΜL (ref 0–0.61)
EOSINOPHIL NFR BLD AUTO: 14 % (ref 0–6)
ERYTHROCYTE [DISTWIDTH] IN BLOOD BY AUTOMATED COUNT: 14.5 % (ref 11.6–15.1)
FERRITIN SERPL-MCNC: 40 NG/ML (ref 11–307)
HCT VFR BLD AUTO: 38.8 % (ref 34.8–46.1)
HGB BLD-MCNC: 12.3 G/DL (ref 11.5–15.4)
IMM GRANULOCYTES # BLD AUTO: 0.02 THOUSAND/UL (ref 0–0.2)
IMM GRANULOCYTES NFR BLD AUTO: 0 % (ref 0–2)
IRON SATN MFR SERPL: 13 % (ref 15–50)
IRON SERPL-MCNC: 46 UG/DL (ref 50–212)
LYMPHOCYTES # BLD AUTO: 0.84 THOUSANDS/ÂΜL (ref 0.6–4.47)
LYMPHOCYTES NFR BLD AUTO: 12 % (ref 14–44)
MCH RBC QN AUTO: 29.8 PG (ref 26.8–34.3)
MCHC RBC AUTO-ENTMCNC: 31.7 G/DL (ref 31.4–37.4)
MCV RBC AUTO: 94 FL (ref 82–98)
MONOCYTES # BLD AUTO: 0.51 THOUSAND/ÂΜL (ref 0.17–1.22)
MONOCYTES NFR BLD AUTO: 7 % (ref 4–12)
NEUTROPHILS # BLD AUTO: 4.56 THOUSANDS/ÂΜL (ref 1.85–7.62)
NEUTS SEG NFR BLD AUTO: 66 % (ref 43–75)
NRBC BLD AUTO-RTO: 0 /100 WBCS
PLATELET # BLD AUTO: 386 THOUSANDS/UL (ref 149–390)
PMV BLD AUTO: 8.9 FL (ref 8.9–12.7)
RBC # BLD AUTO: 4.13 MILLION/UL (ref 3.81–5.12)
RETICS # AUTO: NORMAL 10*3/UL (ref 14097–95744)
RETICS # CALC: 1.46 % (ref 0.37–1.87)
TIBC SERPL-MCNC: 354 UG/DL (ref 250–450)
UIBC SERPL-MCNC: 308 UG/DL (ref 155–355)
WBC # BLD AUTO: 6.92 THOUSAND/UL (ref 4.31–10.16)

## 2024-03-11 PROCEDURE — 83540 ASSAY OF IRON: CPT

## 2024-03-11 PROCEDURE — 85045 AUTOMATED RETICULOCYTE COUNT: CPT

## 2024-03-11 PROCEDURE — 85025 COMPLETE CBC W/AUTO DIFF WBC: CPT

## 2024-03-11 PROCEDURE — 83550 IRON BINDING TEST: CPT

## 2024-03-11 PROCEDURE — 82728 ASSAY OF FERRITIN: CPT

## 2024-03-11 PROCEDURE — 36415 COLL VENOUS BLD VENIPUNCTURE: CPT

## 2024-03-27 ENCOUNTER — OFFICE VISIT (OUTPATIENT)
Dept: FAMILY MEDICINE CLINIC | Facility: CLINIC | Age: 74
End: 2024-03-27
Payer: MEDICARE

## 2024-03-27 VITALS
BODY MASS INDEX: 27.18 KG/M2 | SYSTOLIC BLOOD PRESSURE: 134 MMHG | OXYGEN SATURATION: 99 % | DIASTOLIC BLOOD PRESSURE: 70 MMHG | HEART RATE: 74 BPM | WEIGHT: 126 LBS | TEMPERATURE: 97.6 F | HEIGHT: 57 IN

## 2024-03-27 DIAGNOSIS — R53.83 FATIGUE, UNSPECIFIED TYPE: ICD-10-CM

## 2024-03-27 DIAGNOSIS — M81.0 AGE-RELATED OSTEOPOROSIS WITHOUT CURRENT PATHOLOGICAL FRACTURE: ICD-10-CM

## 2024-03-27 DIAGNOSIS — K58.0 IRRITABLE BOWEL SYNDROME WITH DIARRHEA: ICD-10-CM

## 2024-03-27 DIAGNOSIS — Z79.899 MEDICATION MANAGEMENT: ICD-10-CM

## 2024-03-27 DIAGNOSIS — L28.0 NEURODERMATITIS, LOCALIZED: ICD-10-CM

## 2024-03-27 DIAGNOSIS — I10 ESSENTIAL HYPERTENSION: Primary | ICD-10-CM

## 2024-03-27 DIAGNOSIS — E78.2 MIXED HYPERLIPIDEMIA: ICD-10-CM

## 2024-03-27 DIAGNOSIS — G62.9 NEUROPATHY: ICD-10-CM

## 2024-03-27 DIAGNOSIS — R73.03 PRE-DIABETES: ICD-10-CM

## 2024-03-27 PROCEDURE — 99214 OFFICE O/P EST MOD 30 MIN: CPT | Performed by: FAMILY MEDICINE

## 2024-03-27 PROCEDURE — G2211 COMPLEX E/M VISIT ADD ON: HCPCS | Performed by: FAMILY MEDICINE

## 2024-03-27 NOTE — ASSESSMENT & PLAN NOTE
She will apply triamcinolone cream twice a day, morning and night. If it does not improve in 10 days, she will let me know, and we will order clobetasol.

## 2024-03-27 NOTE — ASSESSMENT & PLAN NOTE
Her last A1C was 5.9. We discussed the importance of keeping her A1c less than 6.5%. We discussed the use of FreeStyle Bhakti.

## 2024-03-27 NOTE — PATIENT INSTRUCTIONS
Breast Cancer Screening    Breast cancer is the most common cancer in females in the [place] and the second most common cause of cancer death in women    The risk of breast cancer from birth to death is 12.9 percent (1 in 8 women)    Approximately 43,000 women will die in the [place] annually from breast cancer    Mammography    A mammogram is an x-ray of your breasts to screen for breast cancer    It uses the least amount of radiation necessary to provide the highest quality image able to detect early signs of breast cancer.    For most women, we recommend getting your first mammogram at the age of 40 and repeating it yearly         Signs of Breast Cancer    Lumps  Thickening or swelling of parts of the breast  Irritation or dimpling of breast skin  Red or flaky skin in the nipple area  Pain in the nipple area  Pulling in of the nipple  Change in size or shape of the breast  Pain in any area of the breast     Did you know by getting a mammogram, doctors can find breast cancer 3 YEARS before a lump is even felt!    Early detection is BEST!  Schedule your mammogram TODAY!     To schedule this appointment with [facility], please contact Central Scheduling at [phone]    There are approximately 150,000 new cases of colorectal cancer diagnosed anually in the [place]    Approximately 53,000 Americans die of colon cancer every year    Colorectal cancer causes 8% of all cancer related deaths in the [place]. That is almost 1 out of every 10 deaths from all cancers.    The incidence of colorectal cancer in people under the age of 50 has steadily increased at a rate of 2 percent per year from 1995 through 2018    The overall death rate from colorectal cancer have declined overall since the mid-1980s in the [place] and this is partially attributed to increased screening    Screening Procedures    There are multiple ways to screen for colorectal cancer which include:  Colonoscopy  CT  Colonography (virtual colonoscopy)  Sigmoidoscopy  Fecal Immunochemical Test (FIT)  DNA Stool Test (cologuard)  High Sensitivity Fecal Occult Blood Test (FOBT)    Colonoscopy remains the gold standard in regards to screening for colorectal cancer. Generally during a colonoscopy, sedation is used to make you comfortable. Then a scope is used to look in the colon to evaluate for evidence of colon polyps or evidence of cancer.     Risk Factors    Outside of a genetic predisposition for colorectal cancer, family history is the second most important risk factor. Having a single affected first-degree relative (parent, sibling, or child) with colorectal cancer increases the risk approximately two-fold over that of the general population  Overweight/Obesity  Diabetes mellitus and insulin resistance   Tobacco  Alcohol  Lack of regular physical activity  Diet low in fruits/vegetables, high in processed foods, low in fiber, and/or high in fat    Signs/Symptoms    Change in bowel habits  Diarrhea or constipation persisting longer than 4 weeks  Rectal bleeding  Prolonged abdominal discomfort  Weakness or fatigue  Weight loss    Early detection is BEST!  Schedule your colonoscopy TODAY if you are 45 years or older!

## 2024-03-27 NOTE — ASSESSMENT & PLAN NOTE
She is doing well on Prolia for many years. She is following with rheumatology. She will continue baclofen 20 mg 4 times a day.

## 2024-03-27 NOTE — PROGRESS NOTES
Assessment/Plan:          1. Essential hypertension  Assessment & Plan:  Blood pressure ideally controlled on amlodipine and Micardis.      2. Age-related osteoporosis without current pathological fracture  Assessment & Plan:  She is doing well on Prolia for many years. She is following with rheumatology. She will continue baclofen 20 mg 4 times a day.      3. Mixed hyperlipidemia  Assessment & Plan:  She will continue Crestor 20 mg.      4. Neuropathy    5. Medication management    6. Irritable bowel syndrome with diarrhea    7. Fatigue, unspecified type    8. Neurodermatitis, localized  Assessment & Plan:  She will apply triamcinolone cream twice a day, morning and night. If it does not improve in 10 days, she will let me know, and we will order clobetasol.      9. Pre-diabetes  Assessment & Plan:  Her last A1C was 5.9. We discussed the importance of keeping her A1c less than 6.5%. We discussed the use of FreeStyle Bhakti.    Orders:  -     Albumin / creatinine urine ratio; Future; Expected date: 06/27/2024  -     Comprehensive metabolic panel; Future; Expected date: 06/27/2024  -     Hemoglobin A1C; Future; Expected date: 06/27/2024        Right foot drop.  A handwritten prescription for a new brace was provided.    Follow-up  The patient will follow up in 3 months with tests for A1c, CMP, and kidney profile.                  Subjective:       Patient ID: Olivia Reid is a 74 y.o. female who presents for follow-up of neuropathy. She is accompanied by an adult male.    She sees Dr. Thompson every 6 months, which is January and July.     She occasionally has diarrhea for 3 days and then she can go 2 weeks without defecating.    Dr. Thompson keeps her on Ambien 5 mg once at bedtime, but she rarely uses it. She used to take it 3 times a week when she was working, now intermittently as she is unemployed.    She has neurodermatitis. She uses triamcinolone cream in the morning.    She checks her blood glucose  "twice a day.    She needs a handwritten prescription for a new brace. She had her current brace when she was 43 years old.    Dr. Elizalde wants her to have a colonoscopy. Her last colonoscopy was in 2019. She has a history of polyps.    She is on amlodipine 5 mg, telmisartan 80 mg, baclofen 20 mg 4 times a day, famotidine 20 mg 2 times a day, Prolia once every 6 months, Tessalon Perles as needed for cough, Lomotil as needed, and Crestor 20 mg.    Her last A1C was 5.9% on 10/18/2023. Prior to that, it was 5.8%, and even earlier than that, it was 5.5%.    The following portions of the patient's history were reviewed and updated as appropriate: allergies, current medications, past family history, past medical history, past social history, past surgical history, and problem list.          Objective:       /70 (BP Location: Left arm, Patient Position: Sitting, Cuff Size: Standard)   Pulse 74   Temp 97.6 °F (36.4 °C) (Temporal)   Ht 4' 9\" (1.448 m)   Wt 57.2 kg (126 lb)   SpO2 99%   BMI 27.27 kg/m²          Physical Exam  Vitals and nursing note reviewed.   Constitutional:       General: She is not in acute distress.     Appearance: Normal appearance. He is well-developed.   HENT:      Head: Normocephalic and atraumatic.   Eyes:      General:         Right eye: No discharge.         Left eye: No discharge.   Neck:      Thyroid: No thyromegaly.   Cardiovascular:      Rate and Rhythm: Normal rate and regular rhythm.      Pulses: Normal pulses.      Heart sounds: Normal heart sounds. No murmur heard.  Pulmonary:      Effort: Pulmonary effort is normal.      Breath sounds: Normal breath sounds. No wheezing or rhonchi.   Musculoskeletal:      Cervical back: Neck supple.      Right lower leg: No edema.      Left lower leg: No edema.   Lymphadenopathy:      Cervical: No cervical adenopathy.   Skin:     General: Skin is warm.      Capillary Refill: Capillary refill takes less than 2 seconds.   Neurological:      " General: No focal deficit present.      Mental Status: He is alert and oriented to person, place, and time.   Psychiatric:         Mood and Affect: Mood normal.         Behavior: Behavior normal.         Thought Content: Thought content normal.          I personally reviewed the recent (and prior)  lab results, the image studies, pathology, other specialty/physicians consult notes and recommendations, and outside medical records from other institutions, as appropriate. I had a lengthy discussion with the patient and shared the work-up findings. We discussed the diagnosis and management plan.  I spent  30  minutes reviewing the records (labs, clinician notes, outside records, medical history, ordering medicine/tests/procedures, interpreting the imaging/labs previously done) and coordination of care as well as direct time with the patient today, of which greater than 50% of the time was spent in counseling and coordination of care with the patient/family.    Transcribed for GLORIA Ga DO, by Charlton Reyes on 03/31/24 at 9:51 PM. Powered by [employer].

## 2024-04-02 DIAGNOSIS — E87.6 HYPOPOTASSEMIA: ICD-10-CM

## 2024-04-02 DIAGNOSIS — M19.90 ARTHRITIS: ICD-10-CM

## 2024-04-02 RX ORDER — CELECOXIB 200 MG/1
CAPSULE ORAL
Qty: 30 CAPSULE | Refills: 1 | Status: SHIPPED | OUTPATIENT
Start: 2024-04-02

## 2024-04-02 RX ORDER — POTASSIUM CHLORIDE 750 MG/1
CAPSULE, EXTENDED RELEASE ORAL
Qty: 360 CAPSULE | Refills: 1 | Status: SHIPPED | OUTPATIENT
Start: 2024-04-02

## 2024-05-02 DIAGNOSIS — E03.9 HYPOTHYROIDISM, UNSPECIFIED TYPE: ICD-10-CM

## 2024-05-02 DIAGNOSIS — E78.2 MIXED HYPERLIPIDEMIA: ICD-10-CM

## 2024-05-02 DIAGNOSIS — M62.838 MUSCLE SPASM OF BOTH LOWER LEGS: ICD-10-CM

## 2024-05-03 RX ORDER — LEVOTHYROXINE SODIUM 0.05 MG/1
TABLET ORAL
Qty: 90 TABLET | Refills: 1 | Status: SHIPPED | OUTPATIENT
Start: 2024-05-03

## 2024-05-03 RX ORDER — BACLOFEN 20 MG/1
TABLET ORAL
Qty: 120 TABLET | Refills: 6 | Status: SHIPPED | OUTPATIENT
Start: 2024-05-03

## 2024-05-03 RX ORDER — ROSUVASTATIN CALCIUM 20 MG/1
TABLET, COATED ORAL
Qty: 90 TABLET | Refills: 1 | Status: SHIPPED | OUTPATIENT
Start: 2024-05-03

## 2024-06-01 DIAGNOSIS — I10 ESSENTIAL HYPERTENSION: ICD-10-CM

## 2024-06-01 DIAGNOSIS — M19.90 ARTHRITIS: ICD-10-CM

## 2024-06-01 RX ORDER — CELECOXIB 200 MG/1
CAPSULE ORAL
Qty: 30 CAPSULE | Refills: 1 | Status: SHIPPED | OUTPATIENT
Start: 2024-06-01

## 2024-06-01 RX ORDER — AMLODIPINE BESYLATE 5 MG/1
5 TABLET ORAL DAILY
Qty: 90 TABLET | Refills: 3 | Status: SHIPPED | OUTPATIENT
Start: 2024-06-01

## 2024-06-12 ENCOUNTER — APPOINTMENT (OUTPATIENT)
Dept: LAB | Facility: CLINIC | Age: 74
End: 2024-06-12
Payer: MEDICARE

## 2024-06-12 DIAGNOSIS — D47.2 MGUS (MONOCLONAL GAMMOPATHY OF UNKNOWN SIGNIFICANCE): ICD-10-CM

## 2024-06-12 DIAGNOSIS — R73.03 PRE-DIABETES: ICD-10-CM

## 2024-06-12 DIAGNOSIS — D50.9 IRON DEFICIENCY ANEMIA, UNSPECIFIED IRON DEFICIENCY ANEMIA TYPE: ICD-10-CM

## 2024-06-12 LAB
ALBUMIN SERPL BCP-MCNC: 3.8 G/DL (ref 3.5–5)
ALP SERPL-CCNC: 66 U/L (ref 34–104)
ALT SERPL W P-5'-P-CCNC: 9 U/L (ref 7–52)
ANION GAP SERPL CALCULATED.3IONS-SCNC: 10 MMOL/L (ref 4–13)
AST SERPL W P-5'-P-CCNC: 17 U/L (ref 13–39)
BASOPHILS # BLD AUTO: 0.04 THOUSANDS/ÂΜL (ref 0–0.1)
BASOPHILS NFR BLD AUTO: 1 % (ref 0–1)
BILIRUB SERPL-MCNC: 0.54 MG/DL (ref 0.2–1)
BUN SERPL-MCNC: 22 MG/DL (ref 5–25)
CALCIUM SERPL-MCNC: 9.4 MG/DL (ref 8.4–10.2)
CHLORIDE SERPL-SCNC: 106 MMOL/L (ref 96–108)
CO2 SERPL-SCNC: 23 MMOL/L (ref 21–32)
CREAT SERPL-MCNC: 0.65 MG/DL (ref 0.6–1.3)
EOSINOPHIL # BLD AUTO: 1.03 THOUSAND/ÂΜL (ref 0–0.61)
EOSINOPHIL NFR BLD AUTO: 19 % (ref 0–6)
ERYTHROCYTE [DISTWIDTH] IN BLOOD BY AUTOMATED COUNT: 13.8 % (ref 11.6–15.1)
FERRITIN SERPL-MCNC: 31 NG/ML (ref 11–307)
GFR SERPL CREATININE-BSD FRML MDRD: 88 ML/MIN/1.73SQ M
GLUCOSE P FAST SERPL-MCNC: 90 MG/DL (ref 65–99)
HCT VFR BLD AUTO: 39.5 % (ref 34.8–46.1)
HGB BLD-MCNC: 12.5 G/DL (ref 11.5–15.4)
IGA SERPL-MCNC: 84 MG/DL (ref 66–433)
IGG SERPL-MCNC: 1870 MG/DL (ref 635–1741)
IGM SERPL-MCNC: 40 MG/DL (ref 45–281)
IMM GRANULOCYTES # BLD AUTO: 0.02 THOUSAND/UL (ref 0–0.2)
IMM GRANULOCYTES NFR BLD AUTO: 0 % (ref 0–2)
LYMPHOCYTES # BLD AUTO: 0.87 THOUSANDS/ÂΜL (ref 0.6–4.47)
LYMPHOCYTES NFR BLD AUTO: 16 % (ref 14–44)
MCH RBC QN AUTO: 30.3 PG (ref 26.8–34.3)
MCHC RBC AUTO-ENTMCNC: 31.6 G/DL (ref 31.4–37.4)
MCV RBC AUTO: 96 FL (ref 82–98)
MONOCYTES # BLD AUTO: 0.47 THOUSAND/ÂΜL (ref 0.17–1.22)
MONOCYTES NFR BLD AUTO: 9 % (ref 4–12)
NEUTROPHILS # BLD AUTO: 3 THOUSANDS/ÂΜL (ref 1.85–7.62)
NEUTS SEG NFR BLD AUTO: 55 % (ref 43–75)
NRBC BLD AUTO-RTO: 0 /100 WBCS
PLATELET # BLD AUTO: 373 THOUSANDS/UL (ref 149–390)
PMV BLD AUTO: 8.9 FL (ref 8.9–12.7)
POTASSIUM SERPL-SCNC: 4.1 MMOL/L (ref 3.5–5.3)
PROT SERPL-MCNC: 7.7 G/DL (ref 6.4–8.4)
RBC # BLD AUTO: 4.13 MILLION/UL (ref 3.81–5.12)
SODIUM SERPL-SCNC: 139 MMOL/L (ref 135–147)
WBC # BLD AUTO: 5.43 THOUSAND/UL (ref 4.31–10.16)

## 2024-06-12 PROCEDURE — 80053 COMPREHEN METABOLIC PANEL: CPT

## 2024-06-12 PROCEDURE — 82728 ASSAY OF FERRITIN: CPT

## 2024-06-12 PROCEDURE — 85025 COMPLETE CBC W/AUTO DIFF WBC: CPT

## 2024-06-12 PROCEDURE — 84165 PROTEIN E-PHORESIS SERUM: CPT

## 2024-06-12 PROCEDURE — 36415 COLL VENOUS BLD VENIPUNCTURE: CPT

## 2024-06-12 PROCEDURE — 82784 ASSAY IGA/IGD/IGG/IGM EACH: CPT

## 2024-06-12 PROCEDURE — 83521 IG LIGHT CHAINS FREE EACH: CPT

## 2024-06-13 LAB
ALBUMIN SERPL ELPH-MCNC: 3.48 G/DL (ref 3.2–5.1)
ALBUMIN SERPL ELPH-MCNC: 47.7 % (ref 48–70)
ALPHA1 GLOB SERPL ELPH-MCNC: 0.35 G/DL (ref 0.15–0.47)
ALPHA1 GLOB SERPL ELPH-MCNC: 4.8 % (ref 1.8–7)
ALPHA2 GLOB SERPL ELPH-MCNC: 0.87 G/DL (ref 0.42–1.04)
ALPHA2 GLOB SERPL ELPH-MCNC: 11.9 % (ref 5.9–14.9)
BETA GLOB ABNORMAL SERPL ELPH-MCNC: 0.43 G/DL (ref 0.31–0.57)
BETA1 GLOB SERPL ELPH-MCNC: 5.9 % (ref 4.7–7.7)
BETA2 GLOB SERPL ELPH-MCNC: 7.8 % (ref 3.1–7.9)
BETA2+GAMMA GLOB SERPL ELPH-MCNC: 0.57 G/DL (ref 0.2–0.58)
GAMMA GLOB ABNORMAL SERPL ELPH-MCNC: 1.6 G/DL (ref 0.4–1.66)
GAMMA GLOB SERPL ELPH-MCNC: 21.9 % (ref 6.9–22.3)
IGG/ALB SER: 0.91 {RATIO} (ref 1.1–1.8)
KAPPA LC FREE SER-MCNC: 46.6 MG/L (ref 3.3–19.4)
KAPPA LC FREE/LAMBDA FREE SER: 2.01 {RATIO} (ref 0.26–1.65)
LAMBDA LC FREE SERPL-MCNC: 23.2 MG/L (ref 5.7–26.3)
M PROTEIN 1 MFR SERPL ELPH: 4.2 %
M PROTEIN 1 SERPL ELPH-MCNC: 0.31 G/DL
PROT PATTERN SERPL ELPH-IMP: ABNORMAL
PROT SERPL-MCNC: 7.3 G/DL (ref 6.4–8.2)

## 2024-06-13 PROCEDURE — 84165 PROTEIN E-PHORESIS SERUM: CPT | Performed by: PATHOLOGY

## 2024-06-24 ENCOUNTER — OFFICE VISIT (OUTPATIENT)
Dept: HEMATOLOGY ONCOLOGY | Facility: CLINIC | Age: 74
End: 2024-06-24
Payer: MEDICARE

## 2024-06-24 VITALS
DIASTOLIC BLOOD PRESSURE: 82 MMHG | HEIGHT: 57 IN | SYSTOLIC BLOOD PRESSURE: 136 MMHG | OXYGEN SATURATION: 96 % | RESPIRATION RATE: 17 BRPM | BODY MASS INDEX: 27.27 KG/M2 | HEART RATE: 65 BPM

## 2024-06-24 DIAGNOSIS — M19.90 ARTHRITIS: ICD-10-CM

## 2024-06-24 DIAGNOSIS — E03.9 ACQUIRED HYPOTHYROIDISM: ICD-10-CM

## 2024-06-24 DIAGNOSIS — D47.2 MGUS (MONOCLONAL GAMMOPATHY OF UNKNOWN SIGNIFICANCE): Primary | ICD-10-CM

## 2024-06-24 DIAGNOSIS — I10 PRIMARY HYPERTENSION: ICD-10-CM

## 2024-06-24 DIAGNOSIS — E55.9 VITAMIN D INSUFFICIENCY: ICD-10-CM

## 2024-06-24 PROCEDURE — 99214 OFFICE O/P EST MOD 30 MIN: CPT | Performed by: INTERNAL MEDICINE

## 2024-06-24 PROCEDURE — G2211 COMPLEX E/M VISIT ADD ON: HCPCS | Performed by: INTERNAL MEDICINE

## 2024-06-24 NOTE — PROGRESS NOTES
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  Patient is here with her .  Patient is in a wheelchair.  She has a walker at home.  Patient has stable MGUS and she is not symptomatic from that.  MGUS parameters are being monitored.  She does not have iron deficiency or anemia and she does not have thrombocytosis anymore.    She has osteoporosis and has been on calcium and vitamin D and extra vitamin D and she gets Prolia from her primary physician.  She has history of arthritis and she does not have left sciatica pain anymore.   For iron deficiency anemia  she had EGD and colonoscopy and had a polyp.  She took 1 iron tablet daily but now she takes it 1 iron tablet 5 days a week . Patient  follows with her gastroenterologist for polyp .    Has tiredness.              Current Outpatient Medications:   •  amLODIPine (NORVASC) 5 mg tablet, TAKE 1 TABLET(5 MG) BY MOUTH DAILY, Disp: 90 tablet, Rfl: 3  •  baclofen 20 mg tablet, TAKE 1 TABLET BY MOUTH FOUR TIMES DAILY, Disp: 120 tablet, Rfl: 6  •  Calcium Carbonate-Vitamin D 600-200 MG-UNIT TABS, Take 1 tablet by mouth in the morning, Disp: , Rfl:   •  celecoxib (CeleBREX) 200 mg capsule, TAKE 1 CAPSULE(200 MG) BY MOUTH DAILY, Disp: 30 capsule, Rfl: 1  •  Cholecalciferol (VITAMIN D PO), Take 3,000 Units by mouth, Disp: , Rfl:   •  denosumab (PROLIA) 60 mg/mL, Inject 60 mg under the skin every 6 (six) months, Disp: , Rfl:   •  diphenoxylate-atropine (LOMOTIL) 2.5-0.025 mg per tablet, TAKE 1 TABLET BY MOUTH FOUR TIMES DAILY AS NEEDED FOR DIARRHEA, Disp: 40 tablet, Rfl: 3  •  famotidine (PEPCID) 20 mg tablet, Take 1 tablet (20 mg total) by mouth 2 (two) times a day, Disp: 100 tablet, Rfl: 3  •  Ferrous Sulfate (IRON PO), Take 65 mg by mouth daily, Disp: , Rfl:   •  levothyroxine 50 mcg tablet, TAKE 1 TABLET BY MOUTH EVERY DAY AS DIRECTED, Disp: 90 tablet, Rfl: 1  •  potassium chloride (MICRO-K) 10  "MEQ CR capsule, TAKE 2 CAPSULES BY MOUTH TWICE DAILY AS DIRECTED, Disp: 360 capsule, Rfl: 1  •  rosuvastatin (CRESTOR) 20 MG tablet, TAKE 1 TABLET BY MOUTH EVERY DAY AS DIRECTED, Disp: 90 tablet, Rfl: 1  •  telmisartan (MICARDIS) 80 MG tablet, TAKE 1 TABLET(80 MG) BY MOUTH DAILY, Disp: 90 tablet, Rfl: 0  •  triamcinolone (KENALOG) 0.1 % cream, Apply topically 2 (two) times a day, Disp: 45 g, Rfl: 1  •  zolpidem (AMBIEN) 5 mg tablet, Take 5 mg by mouth daily at bedtime as needed, Disp: , Rfl:   •  benzonatate (TESSALON PERLES) 100 mg capsule, Take 1 capsule (100 mg total) by mouth 3 (three) times a day as needed for cough (Patient not taking: Reported on 3/27/2024), Disp: 20 capsule, Rfl: 0    Allergies   Allergen Reactions   • Aspirin Hives   • Hydrochlorothiazide Diarrhea   • Ibuprofen Hives       Oncology History    No history exists.       ROS:  06/24/24 Reviewed 12 systems: See symptoms in HPI  Presently no  Other neurological, cardiac, pulmonary, GI and  symptoms other than listed above.  No  fever, chills, bleeding, bone pains, skin rash, weight loss,   weakness, numbness. No frequent infections.  Not unusually sensitive to heat or cold. No swelling of the ankles. No swollen glands.  Patient is anxious.       /82 (BP Location: Left arm, Patient Position: Sitting, Cuff Size: Adult)   Pulse 65   Resp 17   Ht 4' 9\" (1.448 m)   SpO2 96%   BMI 27.27 kg/m²     Physical Exam:    Alert and oriented and not in distress.  Vital signs are above.  No icterus.  No oral thrush.  No palpable neck mass.  Clear lung fields.  Regular heart rate.  Soft and nontender abdomen.  Patient in a wheelchair.  No edema of the ankles.  No calf tenderness.  No focal neurological deficit, no skin rash, no palpable lymphadenopathy in the neck and axillary areas,  no clubbing.    Patient is anxious.  Performance status 3.   Right ankle brace.  Patient goes to her gynecologist for gyn examination and " mammography    IMAGING:  IMPRESSION:     Small right hepatic lobe cyst accounting for recent CT finding.  No suspicious hepatic lesions.     Moderately severe pancreatic atrophy.     Otherwise, unremarkable MRI abdomen.              Workstation performed: [id number]      Imaging    MRI abdomen w wo contrast (Order: [id number]) - 9/4/2018      LABS:      Results for orders placed or performed in visit on 06/12/24   CBC and differential   Result Value Ref Range    WBC 5.43 4.31 - 10.16 Thousand/uL    RBC 4.13 3.81 - 5.12 Million/uL    Hemoglobin 12.5 11.5 - 15.4 g/dL    Hematocrit 39.5 34.8 - 46.1 %    MCV 96 82 - 98 fL    MCH 30.3 26.8 - 34.3 pg    MCHC 31.6 31.4 - 37.4 g/dL    RDW 13.8 11.6 - 15.1 %    MPV 8.9 8.9 - 12.7 fL    Platelets 373 149 - 390 Thousands/uL    nRBC 0 /100 WBCs    Segmented % 55 43 - 75 %    Immature Grans % 0 0 - 2 %    Lymphocytes % 16 14 - 44 %    Monocytes % 9 4 - 12 %    Eosinophils Relative 19 (H) 0 - 6 %    Basophils Relative 1 0 - 1 %    Absolute Neutrophils 3.00 1.85 - 7.62 Thousands/µL    Absolute Immature Grans 0.02 0.00 - 0.20 Thousand/uL    Absolute Lymphocytes 0.87 0.60 - 4.47 Thousands/µL    Absolute Monocytes 0.47 0.17 - 1.22 Thousand/µL    Eosinophils Absolute 1.03 (H) 0.00 - 0.61 Thousand/µL    Basophils Absolute 0.04 0.00 - 0.10 Thousands/µL   Comprehensive metabolic panel   Result Value Ref Range    Sodium 139 135 - 147 mmol/L    Potassium 4.1 3.5 - 5.3 mmol/L    Chloride 106 96 - 108 mmol/L    CO2 23 21 - 32 mmol/L    ANION GAP 10 4 - 13 mmol/L    BUN 22 5 - 25 mg/dL    Creatinine 0.65 0.60 - 1.30 mg/dL    Glucose, Fasting 90 65 - 99 mg/dL    Calcium 9.4 8.4 - 10.2 mg/dL    AST 17 13 - 39 U/L    ALT 9 7 - 52 U/L    Alkaline Phosphatase 66 34 - 104 U/L    Total Protein 7.7 6.4 - 8.4 g/dL    Albumin 3.8 3.5 - 5.0 g/dL    Total Bilirubin 0.54 0.20 - 1.00 mg/dL    eGFR 88 ml/min/1.73sq m   IgG, IgA, IgM   Result Value Ref Range    IGA 84 66 - 433 mg/dL    IGG 1,870 (H) 635 -  1,741 mg/dL    IGM 40 (L) 45 - 281 mg/dL   Immunoglobulin free LT chains blood   Result Value Ref Range    Ig Kappa Free Light Chain 46.6 (H) 3.3 - 19.4 mg/L    Ig Lambda Free Light Chain 23.2 5.7 - 26.3 mg/L    Kappa/Lambda FluidC Ratio 2.01 (H) 0.26 - 1.65   Protein electrophoresis, serum   Result Value Ref Range    A/G Ratio 0.91 (L) 1.10 - 1.80    Albumin Electrophoresis 47.7 (L) 48.0 - 70.0 %    Albumin CONC 3.48 3.20 - 5.10 g/dl    Alpha 1 4.8 1.8 - 7.0 %    ALPHA 1 CONC 0.35 0.15 - 0.47 g/dL    Alpha 2 11.9 5.9 - 14.9 %    ALPHA 2 CONC 0.87 0.42 - 1.04 g/dL    Beta-1 5.9 4.7 - 7.7 %    BETA 1 CONC 0.43 0.31 - 0.57 g/dL    Beta-2 7.8 3.1 - 7.9 %    BETA 2 CONC 0.57 0.20 - 0.58 g/dL    Gamma Globulin 21.9 6.9 - 22.3 %    GAMMA CONC 1.60 0.40 - 1.66 g/dL    M Peak ID 1 4.20 %    M PEAK 1 CONC 0.31 g/dL    Total Protein 7.3 6.4 - 8.2 g/dL    SPEP Interpretation See Comment    Ferritin   Result Value Ref Range    Ferritin 31 11 - 307 ng/mL     Labs, Imaging, & Other studies:   All pertinent labs and imaging studies were personally reviewed      Assessment and plan:     Continuation of care for MGUS and previous history of iron deficiency and JAK2 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  Patient is here with her .  Patient is in a wheelchair.  She has a walker at home.  Patient has stable MGUS and she is not symptomatic from that.  MGUS parameters are being monitored.  She does not have iron deficiency or anemia and she does not have thrombocytosis anymore.    She has osteoporosis and has been on calcium and vitamin D and extra vitamin D and she gets Prolia from her primary physician.  She has history of arthritis and she does not have left sciatica pain anymore.   For iron deficiency anemia  she had EGD and colonoscopy and had a polyp.  She took 1 iron tablet daily but now she takes it 1 iron tablet 5 days a week . Patient  follows with her gastroenterologist for polyp .    Has tiredness.        .  Physical  examination and test results are as recorded and discussed.  Gammopathy parameters are being monitored and are stable.    Patient states she is up-to-date with her medical checkups and health screening tests including gyn examination and mammography.  She will continue to take 1 iron tablet 5 days a week.   All discussed in detail.  Questions answered.  Discussed the importance of self-breast examination, eating healthy foods, staying active and health screening test.  Patient needs  assistance in her care.  Discussed precautions against infections.  Goal is to monitor patient's condition and gammopathy parameters    .  Patient will continue to follow with her primary physician and other consultants.  See diagnoses and orders below.    1. MGUS (monoclonal gammopathy of unknown significance)    - CBC and differential; Future  - Comprehensive metabolic panel; Future  - IgG, IgA, IgM; Future  - Immunoglobulin free LT chains blood; Future  - Protein electrophoresis, serum; Future    2. Vitamin D insufficiency    - Vitamin D 25 hydroxy; Future    3. Acquired hypothyroidism      4. Primary hypertension      5. Arthritis    Blood work prior to next visit in 6 months    I used a dictation device to dictate this note and there could be mistakes in my note and for that patient may contact my office    Patient voiced understanding and agrees      Counseling / Coordination of Care  .  Provided counseling and support

## 2024-06-25 DIAGNOSIS — K58.0 IRRITABLE BOWEL SYNDROME WITH DIARRHEA: ICD-10-CM

## 2024-06-25 RX ORDER — DIPHENOXYLATE HYDROCHLORIDE AND ATROPINE SULFATE 2.5; .025 MG/1; MG/1
TABLET ORAL
Qty: 40 TABLET | Refills: 3 | Status: SHIPPED | OUTPATIENT
Start: 2024-06-25

## 2024-07-02 ENCOUNTER — TELEPHONE (OUTPATIENT)
Age: 74
End: 2024-07-02

## 2024-07-02 NOTE — TELEPHONE ENCOUNTER
"Patient calling stating Dr. Ribeiro wanted there to repeat labs this month but there are no orders in there from Dr. Ribeiro.     Reviewed last Office note. It does state the following  \"May repeat PTH, calcium and vitamin D labs in 6-12 months. \"    Patient is asking if these labs can be placed so she can get them done when she goes to get her PCP's labs done as well    Please advise.   "

## 2024-07-03 ENCOUNTER — APPOINTMENT (OUTPATIENT)
Dept: LAB | Facility: CLINIC | Age: 74
End: 2024-07-03
Payer: MEDICARE

## 2024-07-03 LAB
ALBUMIN SERPL BCG-MCNC: 3.7 G/DL (ref 3.5–5)
ALP SERPL-CCNC: 66 U/L (ref 34–104)
ALT SERPL W P-5'-P-CCNC: 11 U/L (ref 7–52)
ANION GAP SERPL CALCULATED.3IONS-SCNC: 10 MMOL/L (ref 4–13)
AST SERPL W P-5'-P-CCNC: 17 U/L (ref 13–39)
BILIRUB SERPL-MCNC: 0.5 MG/DL (ref 0.2–1)
BUN SERPL-MCNC: 18 MG/DL (ref 5–25)
CALCIUM SERPL-MCNC: 9.7 MG/DL (ref 8.4–10.2)
CHLORIDE SERPL-SCNC: 104 MMOL/L (ref 96–108)
CO2 SERPL-SCNC: 24 MMOL/L (ref 21–32)
CREAT SERPL-MCNC: 0.65 MG/DL (ref 0.6–1.3)
CREAT UR-MCNC: 37.6 MG/DL
EST. AVERAGE GLUCOSE BLD GHB EST-MCNC: 117 MG/DL
GFR SERPL CREATININE-BSD FRML MDRD: 88 ML/MIN/1.73SQ M
GLUCOSE P FAST SERPL-MCNC: 82 MG/DL (ref 65–99)
HBA1C MFR BLD: 5.7 %
MICROALBUMIN UR-MCNC: 10.9 MG/L
MICROALBUMIN/CREAT 24H UR: 29 MG/G CREATININE (ref 0–30)
POTASSIUM SERPL-SCNC: 4.2 MMOL/L (ref 3.5–5.3)
PROT SERPL-MCNC: 7.4 G/DL (ref 6.4–8.4)
SODIUM SERPL-SCNC: 138 MMOL/L (ref 135–147)

## 2024-07-03 PROCEDURE — 82570 ASSAY OF URINE CREATININE: CPT

## 2024-07-03 PROCEDURE — 83036 HEMOGLOBIN GLYCOSYLATED A1C: CPT

## 2024-07-03 PROCEDURE — 80053 COMPREHEN METABOLIC PANEL: CPT

## 2024-07-03 PROCEDURE — 82043 UR ALBUMIN QUANTITATIVE: CPT

## 2024-07-05 DIAGNOSIS — E55.9 VITAMIN D DEFICIENCY: ICD-10-CM

## 2024-07-05 DIAGNOSIS — E21.3 HYPERPARATHYROIDISM (HCC): ICD-10-CM

## 2024-07-05 DIAGNOSIS — M81.0 AGE-RELATED OSTEOPOROSIS WITHOUT CURRENT PATHOLOGICAL FRACTURE: Primary | ICD-10-CM

## 2024-07-11 ENCOUNTER — OFFICE VISIT (OUTPATIENT)
Dept: FAMILY MEDICINE CLINIC | Facility: CLINIC | Age: 74
End: 2024-07-11
Payer: MEDICARE

## 2024-07-11 VITALS
WEIGHT: 126 LBS | BODY MASS INDEX: 27.18 KG/M2 | SYSTOLIC BLOOD PRESSURE: 130 MMHG | HEART RATE: 53 BPM | HEIGHT: 57 IN | OXYGEN SATURATION: 98 % | TEMPERATURE: 98 F | DIASTOLIC BLOOD PRESSURE: 76 MMHG

## 2024-07-11 DIAGNOSIS — D50.9 IRON DEFICIENCY ANEMIA, UNSPECIFIED IRON DEFICIENCY ANEMIA TYPE: ICD-10-CM

## 2024-07-11 DIAGNOSIS — E21.3 HYPERPARATHYROIDISM (HCC): ICD-10-CM

## 2024-07-11 DIAGNOSIS — M54.32 LEFT SIDED SCIATICA: ICD-10-CM

## 2024-07-11 DIAGNOSIS — L28.0 NEURODERMATITIS, LOCALIZED: ICD-10-CM

## 2024-07-11 DIAGNOSIS — I10 PRIMARY HYPERTENSION: Primary | ICD-10-CM

## 2024-07-11 DIAGNOSIS — G62.9 NEUROPATHY: ICD-10-CM

## 2024-07-11 DIAGNOSIS — E03.9 ACQUIRED HYPOTHYROIDISM: ICD-10-CM

## 2024-07-11 PROCEDURE — 99214 OFFICE O/P EST MOD 30 MIN: CPT | Performed by: FAMILY MEDICINE

## 2024-07-11 PROCEDURE — G0439 PPPS, SUBSEQ VISIT: HCPCS | Performed by: FAMILY MEDICINE

## 2024-07-11 RX ORDER — TRIAMCINOLONE ACETONIDE 1 MG/G
CREAM TOPICAL 2 TIMES DAILY
Qty: 45 G | Refills: 1 | Status: SHIPPED | OUTPATIENT
Start: 2024-07-11

## 2024-07-11 RX ORDER — TRIAMCINOLONE ACETONIDE 1 MG/G
CREAM TOPICAL 2 TIMES DAILY
Qty: 45 G | Refills: 1 | Status: CANCELLED | OUTPATIENT
Start: 2024-07-11

## 2024-07-11 NOTE — PROGRESS NOTES
Ambulatory Visit  Name: Olivia Reid      : 1950      MRN: [id number]  Encounter Provider: GLORIA Ga DO  Encounter Date: 2024   Encounter department: [facility] [place]    Assessment & Plan        Preventive health issues were discussed with patient, and age appropriate screening tests were ordered as noted in patient's After Visit Summary. Personalized health advice and appropriate referrals for health education or preventive services given if needed, as noted in patient's After Visit Summary.    History of Present Illness     HPI   Patient Care Team:  GLORIA Ga DO as PCP - General (Family Medicine)  MD Lina Martínez MD (Endocrinology)  Carlos Manuel Elizalde MD as Medical Oncologist (Hematology and Oncology)    Review of Systems  Medical History Reviewed by provider this encounter:       Annual Wellness Visit Questionnaire   Olivia is here for her Subsequent Wellness visit. Last Medicare Wellness visit information reviewed, patient interviewed and updates made to the record today.      Health Risk Assessment:   Patient rates overall health as very good. Patient feels that their physical health rating is same. Patient is satisfied with their life. Eyesight was rated as same. Hearing was rated as same. Patient feels that their emotional and mental health rating is same. Patients states they are never, rarely angry. Patient states they are always unusually tired/fatigued. Pain experienced in the last 7 days has been some. Patient's pain rating has been 10/10. Patient states that she has experienced no weight loss or gain in last 6 months. Back pain that sometimes radiates down legs.     Depression Screening:   PHQ-2 Score: 0      Fall Risk Screening:   In the past year, patient has experienced: no history of falling in past year      Urinary Incontinence Screening:   Patient has not leaked urine accidently in the last six months.     Home Safety:  Patient has  trouble with stairs inside or outside of their home. Patient has working smoke alarms and has working carbon monoxide detector. Home safety hazards include: none.     Nutrition:   Current diet is Regular, Limited junk food and Other (please comment). Pt has regular protein intake, as well as some fruits and some vegetables.     Medications:   Patient is currently taking over-the-counter supplements. OTC medications include: see medication list. Patient is able to manage medications.     Activities of Daily Living (ADLs)/Instrumental Activities of Daily Living (IADLs):   Walk and transfer into and out of bed and chair?: Yes  Dress and groom yourself?: Yes    Bathe or shower yourself?: Yes    Feed yourself? Yes  Do your laundry/housekeeping?: Yes  Manage your money, pay your bills and track your expenses?: Yes  Make your own meals?: Yes    Do your own shopping?: Yes    Previous Hospitalizations:   Any hospitalizations or ED visits within the last 12 months?: No      Advance Care Planning:   Living will: Yes    Durable POA for healthcare: Yes    Advanced directive: Yes    Advanced directive counseling given: Yes    ACP document given: Yes    Patient declined ACP directive: No    End of Life Decisions reviewed with patient: Yes    Provider agrees with end of life decisions: Yes      Cognitive Screening:   Provider or family/friend/caregiver concerned regarding cognition?: No    PREVENTIVE SCREENINGS      Cardiovascular Screening:    General: Screening Not Indicated, History Lipid Disorder, Risks and Benefits Discussed and Screening Current      Diabetes Screening:     General: Screening Current and Risks and Benefits Discussed      Colorectal Cancer Screening:     General: Screening Current      Breast Cancer Screening:     General: Screening Current and Risks and Benefits Discussed      Cervical Cancer Screening:    General: Screening Not Indicated and Risks and Benefits Discussed      Osteoporosis Screening:     General: Screening Not Indicated, History Osteoporosis and Risks and Benefits Discussed      Abdominal Aortic Aneurysm (AAA) Screening:        General: Risks and Benefits Discussed      Lung Cancer Screening:     General: Screening Not Indicated and Risks and Benefits Discussed      Hepatitis C Screening:    General: Screening Current and Risks and Benefits Discussed    Hep C Screening Accepted: No     Screening, Brief Intervention, and Referral to Treatment (SBIRT)    Screening  Typical number of drinks in a day: 0  Typical number of drinks in a week: 0  Interpretation: Low risk drinking behavior.    Single Item Drug Screening:  How often have you used an illegal drug (including marijuana) or a prescription medication for non-medical reasons in the past year? never    Single Item Drug Screen Score: 0  Interpretation: Negative screen for possible drug use disorder    Brief Intervention  Alcohol & drug use screenings were reviewed. No concerns regarding substance use disorder identified. Healthy alcohol use/limits discussed.     Other Counseling Topics:   Car/seat belt/driving safety, skin self-exam, sunscreen and calcium and vitamin D intake and regular weightbearing exercise.     Social Determinants of Health     Financial Resource Strain: Low Risk  (5/24/2023)    Overall Financial Resource Strain (CARDIA)     Difficulty of Paying Living Expenses: Not hard at all   Transportation Needs: No Transportation Needs (5/24/2023)    PRAPARE - Transportation     Lack of Transportation (Medical): No     Lack of Transportation (Non-Medical): No     No results found.    Objective     There were no vitals taken for this visit.    Physical Exam  Administrative Statements   I have spent a total time of 40 minutes in caring for this patient on the day of the visit/encounter including Diagnostic results, Prognosis, Risks and benefits of tx options, Instructions for management, Patient and family education, Importance of tx  compliance, Risk factor reductions, Impressions, Counseling / Coordination of care, Documenting in the medical record, Reviewing / ordering tests, medicine, procedures  , and Obtaining or reviewing history  .

## 2024-07-11 NOTE — ASSESSMENT & PLAN NOTE
1. Primary hypertension  Assessment & Plan:         2. Hyperparathyroidism (HCC)  Assessment & Plan:         3. Acquired hypothyroidism  Assessment & Plan:         4. Left sided sciatica  Comments:  resolved about 4 mo ago  Assessment & Plan:         5. Neuropathy  Comments:  in L hand, used splint and better now. Uses splint prn  Assessment & Plan:         6. Iron deficiency anemia, unspecified iron deficiency anemia type  Assessment & Plan:

## 2024-07-11 NOTE — PROGRESS NOTES
Ambulatory Visit  Name: Olivia Reid      : 1950      MRN: [id number]  Encounter Provider: GLORIA Ga DO  Encounter Date: 2024   Encounter department: [facility] [place]    Assessment & Plan  Primary hypertension         Hyperparathyroidism (HCC)         Acquired hypothyroidism         Left sided sciatica         Neuropathy         Iron deficiency anemia, unspecified iron deficiency anemia type       1. Primary hypertension  Assessment & Plan:         2. Hyperparathyroidism (HCC)  Assessment & Plan:         3. Acquired hypothyroidism  Assessment & Plan:         4. Left sided sciatica  Comments:  resolved about 4 mo ago  Assessment & Plan:         5. Neuropathy  Comments:  in L hand, used splint and better now. Uses splint prn  Assessment & Plan:         6. Iron deficiency anemia, unspecified iron deficiency anemia type  Assessment & Plan:               Depression Screening and Follow-up Plan: Patient was screened for depression during today's encounter. They screened negative with a PHQ-2 score of 0.        History of Present Illness     History of Present Illness  The patient presents for evaluation of multiple medical concerns.    The patient is currently on a regimen of amlodipine 5 mg and telmisartan 80 mg for hypertension. Previously, she was on hydrochlorothiazide, but discontinued due to adverse effects.     Review of Systems   Constitutional:  Negative for activity change, appetite change, chills, diaphoresis, fatigue and fever.   HENT:  Negative for congestion, ear discharge, ear pain, facial swelling, nosebleeds, sore throat, tinnitus, trouble swallowing and voice change.    Eyes:  Negative for photophobia, pain, discharge, redness, itching and visual disturbance.   Respiratory:  Negative for apnea, cough, choking, chest tightness and shortness of breath.    Cardiovascular:  Negative for chest pain, palpitations and leg swelling.   Gastrointestinal:  Negative for  "abdominal distention, abdominal pain, blood in stool, constipation, diarrhea, nausea and vomiting.   Endocrine: Negative for cold intolerance, heat intolerance, polydipsia, polyphagia and polyuria.   Genitourinary:  Negative for decreased urine volume, difficulty urinating, dysuria, enuresis, frequency, hematuria, pelvic pain, urgency and vaginal bleeding.   Musculoskeletal:  Negative for arthralgias, back pain, gait problem, joint swelling, neck pain and neck stiffness.   Skin:  Negative for color change, pallor and rash.   Allergic/Immunologic: Negative for immunocompromised state.   Neurological:  Negative for dizziness, seizures, facial asymmetry, light-headedness, numbness and headaches.   Hematological:  Negative for adenopathy.   Psychiatric/Behavioral:  Negative for agitation, behavioral problems, confusion, decreased concentration, dysphoric mood and hallucinations.      Objective     /76 (BP Location: Left arm, Patient Position: Sitting, Cuff Size: Standard)   Pulse (!) 53   Temp 98 °F (36.7 °C) (Temporal)   Ht 4' 9\" (1.448 m)   Wt 57.2 kg (126 lb)   SpO2 98%   BMI 27.27 kg/m²     Physical Exam    Physical Exam  Vitals and nursing note reviewed.   Constitutional:       General: She is not in acute distress.     Appearance: Normal appearance. She is not ill-appearing, toxic-appearing or diaphoretic.   HENT:      Head: Normocephalic.      Nose: Nose normal.      Mouth/Throat:      Mouth: Mucous membranes are moist.      Pharynx: No oropharyngeal exudate or posterior oropharyngeal erythema.   Eyes:      Extraocular Movements: Extraocular movements intact.      Pupils: Pupils are equal, round, and reactive to light.   Cardiovascular:      Rate and Rhythm: Normal rate and regular rhythm.      Pulses: Normal pulses.      Heart sounds: Normal heart sounds.      No gallop.   Pulmonary:      Effort: No respiratory distress.      Breath sounds: Normal breath sounds. No wheezing, rhonchi or rales. "   Abdominal:      General: Abdomen is flat.   Musculoskeletal:         General: No tenderness.      Cervical back: Normal range of motion and neck supple.      Right lower leg: No edema.      Left lower leg: No edema.   Skin:     General: Skin is warm.   Neurological:      General: No focal deficit present.      Mental Status: She is alert and oriented to person, place, and time. Mental status is at baseline.   Psychiatric:         Mood and Affect: Mood normal.         Behavior: Behavior normal.         Thought Content: Thought content normal.         Judgment: Judgment normal.       Administrative Statements   I have spent a total time of 35 minutes in caring for this patient on the day of the visit/encounter including Diagnostic results, Prognosis, Risks and benefits of tx options, Instructions for management, Patient and family education, Importance of tx compliance, Risk factor reductions, Impressions, Counseling / Coordination of care, Documenting in the medical record, Reviewing / ordering tests, medicine, procedures  , and Obtaining or reviewing history  .

## 2024-07-31 DIAGNOSIS — M19.90 ARTHRITIS: ICD-10-CM

## 2024-07-31 RX ORDER — CELECOXIB 200 MG/1
CAPSULE ORAL
Qty: 30 CAPSULE | Refills: 5 | Status: SHIPPED | OUTPATIENT
Start: 2024-07-31

## 2024-08-01 ENCOUNTER — APPOINTMENT (OUTPATIENT)
Dept: LAB | Facility: CLINIC | Age: 74
End: 2024-08-01
Payer: MEDICARE

## 2024-08-01 DIAGNOSIS — E55.9 VITAMIN D DEFICIENCY: ICD-10-CM

## 2024-08-01 DIAGNOSIS — E21.3 HYPERPARATHYROIDISM (HCC): ICD-10-CM

## 2024-08-01 LAB
25(OH)D3 SERPL-MCNC: 50.8 NG/ML (ref 30–100)
ALBUMIN SERPL BCG-MCNC: 3.6 G/DL (ref 3.5–5)
ALP SERPL-CCNC: 71 U/L (ref 34–104)
ALT SERPL W P-5'-P-CCNC: 10 U/L (ref 7–52)
ANION GAP SERPL CALCULATED.3IONS-SCNC: 7 MMOL/L (ref 4–13)
AST SERPL W P-5'-P-CCNC: 17 U/L (ref 13–39)
BILIRUB SERPL-MCNC: 0.46 MG/DL (ref 0.2–1)
BUN SERPL-MCNC: 19 MG/DL (ref 5–25)
CALCIUM SERPL-MCNC: 9.8 MG/DL (ref 8.4–10.2)
CHLORIDE SERPL-SCNC: 106 MMOL/L (ref 96–108)
CO2 SERPL-SCNC: 26 MMOL/L (ref 21–32)
CREAT SERPL-MCNC: 0.71 MG/DL (ref 0.6–1.3)
GFR SERPL CREATININE-BSD FRML MDRD: 85 ML/MIN/1.73SQ M
GLUCOSE P FAST SERPL-MCNC: 88 MG/DL (ref 65–99)
PHOSPHATE SERPL-MCNC: 3.8 MG/DL (ref 2.3–4.1)
POTASSIUM SERPL-SCNC: 4.2 MMOL/L (ref 3.5–5.3)
PROT SERPL-MCNC: 8 G/DL (ref 6.4–8.4)
PTH-INTACT SERPL-MCNC: 46.2 PG/ML (ref 12–88)
SODIUM SERPL-SCNC: 139 MMOL/L (ref 135–147)

## 2024-08-01 PROCEDURE — 83970 ASSAY OF PARATHORMONE: CPT

## 2024-08-01 PROCEDURE — 82306 VITAMIN D 25 HYDROXY: CPT

## 2024-08-01 PROCEDURE — 36415 COLL VENOUS BLD VENIPUNCTURE: CPT

## 2024-08-01 PROCEDURE — 80053 COMPREHEN METABOLIC PANEL: CPT

## 2024-08-01 PROCEDURE — 84100 ASSAY OF PHOSPHORUS: CPT

## 2024-08-21 ENCOUNTER — CLINICAL SUPPORT (OUTPATIENT)
Dept: FAMILY MEDICINE CLINIC | Facility: CLINIC | Age: 74
End: 2024-08-21

## 2024-08-21 DIAGNOSIS — M81.0 AGE-RELATED OSTEOPOROSIS WITHOUT CURRENT PATHOLOGICAL FRACTURE: Primary | ICD-10-CM

## 2024-09-12 ENCOUNTER — TELEPHONE (OUTPATIENT)
Dept: HEMATOLOGY ONCOLOGY | Facility: CLINIC | Age: 74
End: 2024-09-12

## 2024-09-12 NOTE — TELEPHONE ENCOUNTER
Left message for patient regarding need to reschedule upcoming appointment with Dr. Elizalde from 12/12 to 12/13 at 10:00am due to provider availability.  Advised patient to return call if this appointment time is not acceptable.  [employer] number provided.

## 2024-09-29 DIAGNOSIS — I10 ESSENTIAL HYPERTENSION: ICD-10-CM

## 2024-09-29 DIAGNOSIS — E87.6 HYPOPOTASSEMIA: ICD-10-CM

## 2024-09-29 RX ORDER — TELMISARTAN 80 MG/1
TABLET ORAL
Qty: 90 TABLET | Refills: 1 | Status: SHIPPED | OUTPATIENT
Start: 2024-09-29

## 2024-09-29 RX ORDER — POTASSIUM CHLORIDE 750 MG/1
CAPSULE, EXTENDED RELEASE ORAL
Qty: 360 CAPSULE | Refills: 1 | Status: SHIPPED | OUTPATIENT
Start: 2024-09-29

## 2024-10-17 ENCOUNTER — RA CDI HCC (OUTPATIENT)
Dept: OTHER | Facility: HOSPITAL | Age: 74
End: 2024-10-17

## 2024-10-23 ENCOUNTER — OFFICE VISIT (OUTPATIENT)
Dept: FAMILY MEDICINE CLINIC | Facility: CLINIC | Age: 74
End: 2024-10-23
Payer: MEDICARE

## 2024-10-23 VITALS
DIASTOLIC BLOOD PRESSURE: 74 MMHG | WEIGHT: 127.5 LBS | TEMPERATURE: 98.5 F | HEIGHT: 57 IN | SYSTOLIC BLOOD PRESSURE: 116 MMHG | OXYGEN SATURATION: 98 % | BODY MASS INDEX: 27.51 KG/M2 | HEART RATE: 77 BPM

## 2024-10-23 DIAGNOSIS — E78.2 MIXED HYPERLIPIDEMIA: ICD-10-CM

## 2024-10-23 DIAGNOSIS — D50.9 IRON DEFICIENCY ANEMIA, UNSPECIFIED IRON DEFICIENCY ANEMIA TYPE: ICD-10-CM

## 2024-10-23 DIAGNOSIS — I10 PRIMARY HYPERTENSION: ICD-10-CM

## 2024-10-23 DIAGNOSIS — M25.561 PAIN IN BOTH KNEES, UNSPECIFIED CHRONICITY: ICD-10-CM

## 2024-10-23 DIAGNOSIS — D47.2 MGUS (MONOCLONAL GAMMOPATHY OF UNKNOWN SIGNIFICANCE): ICD-10-CM

## 2024-10-23 DIAGNOSIS — Z23 NEED FOR VACCINATION: ICD-10-CM

## 2024-10-23 DIAGNOSIS — Z71.2 ENCOUNTER TO DISCUSS TEST RESULTS: ICD-10-CM

## 2024-10-23 DIAGNOSIS — M25.562 PAIN IN BOTH KNEES, UNSPECIFIED CHRONICITY: ICD-10-CM

## 2024-10-23 DIAGNOSIS — M35.3 POLYMYALGIA RHEUMATICA (HCC): ICD-10-CM

## 2024-10-23 DIAGNOSIS — M81.0 AGE-RELATED OSTEOPOROSIS WITHOUT CURRENT PATHOLOGICAL FRACTURE: ICD-10-CM

## 2024-10-23 DIAGNOSIS — Z23 ENCOUNTER FOR IMMUNIZATION: ICD-10-CM

## 2024-10-23 DIAGNOSIS — Z79.899 MEDICATION MANAGEMENT: Primary | ICD-10-CM

## 2024-10-23 DIAGNOSIS — I10 ESSENTIAL HYPERTENSION: ICD-10-CM

## 2024-10-23 DIAGNOSIS — M48.05 SPINAL STENOSIS OF THORACOLUMBAR REGION: ICD-10-CM

## 2024-10-23 PROBLEM — D47.3 ESSENTIAL (HEMORRHAGIC) THROMBOCYTHEMIA (HCC): Status: RESOLVED | Noted: 2018-08-07 | Resolved: 2024-10-23

## 2024-10-23 PROCEDURE — G0008 ADMIN INFLUENZA VIRUS VAC: HCPCS | Performed by: FAMILY MEDICINE

## 2024-10-23 PROCEDURE — 99215 OFFICE O/P EST HI 40 MIN: CPT | Performed by: FAMILY MEDICINE

## 2024-10-23 PROCEDURE — 90662 IIV NO PRSV INCREASED AG IM: CPT | Performed by: FAMILY MEDICINE

## 2024-10-23 NOTE — PROGRESS NOTES
Ambulatory Visit  Name: Olivia Reid      : 1950      MRN: [id number]  Encounter Provider: GLORIA Ga DO  Encounter Date: 10/23/2024   Encounter department: [facility]IMARY [facility] [place]    Assessment & Plan  Medication management  Medications reviewed for safety efficacy and tolerance and compliance, also       Encounter for immunization  Reviewed and flu given  Orders:    influenza vaccine, high-dose, PF 0.5 mL (Fluzone High Dose)    Polymyalgia rheumatica (HCC)  Is trying to stay active and walking--discussed conditioning       Encounter to discuss test results  All laboratory studies for the past year reviewed with patient and . She sees several MD's, all ordering labs. I'll observe and select prn       Essential hypertension  Blood pressure 116/74 taking amlodipine 5 and olmesartan--mycardis--80 mg once daily since       Primary hypertension  Controlled nicely       Pain in both knees, unspecified chronicity  Ortho MD advised to replace L knee-she is seriously thinking that over. Long discussion of that       Age-related osteoporosis without current pathological fracture  Did affect the R knee few yrs ago       Need for vaccination  Flu vac       Iron deficiency anemia, unspecified iron deficiency anemia type  Dr. Elizalde is following and doing well       MGUS (monoclonal gammopathy of unknown significance)  Under observation with hem/onc       Mixed hyperlipidemia  Taking Crestor 20mg OD and lipid profile is very good       Spinal stenosis of thoracolumbar region  Mostly w/c ridden            History of Present Illness     History of Present Illness  The patient is a 74-year-old female who presents for evaluation of multiple medical concerns. She is accompanied by her .    She is experiencing pain in both knees, which is not limited to any specific activity or time of day. The pain is present even during sleep. She has been advised to undergo rehabilitation for 6 weeks prior  to knee surgery. She is currently using a knee brace for support. In 2009, she suffered a fracture in one of her knees.-the RIGHT (tibial plat).  Despite the pain, she continued to walk on it until the discomfort became unbearable. An x-ray was performed, but it did not reveal any abnormalities. However, an MRI scan later detected a minor crack and diagnosed her with osteoporosis. She sought treatment from the [facility] for her knee condition. Ortho now is telling her she needs LEFT TKR - she is thinking that over    She has a mild rash, which is improving with the use of a steroid cream and CeraVe. She applies the CeraVe first, allows it to dry, and then applies the steroid cream. Her arms are showing signs of healing.     Review of Systems   Constitutional:  Negative for activity change, appetite change, chills, diaphoresis, fatigue and fever.   HENT:  Negative for congestion, ear discharge, ear pain, facial swelling, nosebleeds, sore throat, tinnitus, trouble swallowing and voice change.    Eyes:  Negative for photophobia, pain, discharge, redness, itching and visual disturbance.   Respiratory:  Negative for apnea, cough, choking, chest tightness and shortness of breath.    Cardiovascular:  Negative for chest pain, palpitations and leg swelling.   Gastrointestinal:  Negative for abdominal distention, abdominal pain, blood in stool, constipation, diarrhea, nausea and vomiting.   Endocrine: Negative for cold intolerance, heat intolerance, polydipsia, polyphagia and polyuria.   Genitourinary:  Negative for decreased urine volume, difficulty urinating, dysuria, enuresis, frequency, hematuria, pelvic pain, urgency and vaginal bleeding.   Musculoskeletal:  Negative for arthralgias, back pain, gait problem, joint swelling, neck pain and neck stiffness.   Skin:  Negative for color change, pallor and rash.   Allergic/Immunologic: Negative for immunocompromised state.   Neurological:  Negative for dizziness,  "seizures, facial asymmetry, light-headedness, numbness and headaches.        Ambulatory dysfunction, walks short distances with walker and w/c otherwise.   Hematological:  Negative for adenopathy.   Psychiatric/Behavioral:  Negative for agitation, behavioral problems, confusion, decreased concentration, dysphoric mood and hallucinations.      Objective     /74 (BP Location: Left arm, Patient Position: Sitting, Cuff Size: Standard)   Pulse 77   Temp 98.5 °F (36.9 °C) (Temporal)   Ht 4' 9\" (1.448 m)   Wt 57.8 kg (127 lb 8 oz)   SpO2 98%   BMI 27.59 kg/m²     Physical Exam  Vital Signs  Blood pressure reading is 116/74.  Physical Exam  Vitals and nursing note reviewed. Exam conducted with a chaperone present (seen with ).   Constitutional:       General: She is not in acute distress.     Appearance: Normal appearance. She is not ill-appearing, toxic-appearing or diaphoretic.   HENT:      Head: Normocephalic.      Nose: Nose normal.      Mouth/Throat:      Mouth: Mucous membranes are moist.      Pharynx: No oropharyngeal exudate or posterior oropharyngeal erythema.   Eyes:      Extraocular Movements: Extraocular movements intact.      Pupils: Pupils are equal, round, and reactive to light.   Cardiovascular:      Rate and Rhythm: Normal rate and regular rhythm.      Pulses: Normal pulses.      Heart sounds: Normal heart sounds.      No gallop.   Pulmonary:      Effort: No respiratory distress.      Breath sounds: Normal breath sounds. No wheezing, rhonchi or rales.   Abdominal:      General: Abdomen is flat.   Musculoskeletal:         General: No tenderness.      Cervical back: Normal range of motion and neck supple.      Right lower leg: No edema.      Left lower leg: No edema.   Skin:     General: Skin is warm.      Findings: Rash (has significant rash over chest and back - bed bugs - fighting them for wks; coming from neighbors appt) present.   Neurological:      General: No focal deficit present. "      Mental Status: She is alert and oriented to person, place, and time. Mental status is at baseline.   Psychiatric:         Mood and Affect: Mood normal.         Behavior: Behavior normal.         Thought Content: Thought content normal.         Judgment: Judgment normal.       Administrative Statements   I have spent a total time of 40 minutes in caring for this patient on the day of the visit/encounter including Diagnostic results, Prognosis, Risks and benefits of tx options, Instructions for management, Patient and family education, Importance of tx compliance, Risk factor reductions, Impressions, Counseling / Coordination of care, Documenting in the medical record, Reviewing / ordering tests, medicine, procedures  , and Obtaining or reviewing history  .

## 2024-10-24 NOTE — ASSESSMENT & PLAN NOTE
All laboratory studies for the past year reviewed with patient and . She sees several MD's, all ordering labs. I'll observe and select prn

## 2024-10-28 ENCOUNTER — TELEPHONE (OUTPATIENT)
Age: 74
End: 2024-10-28

## 2024-10-28 NOTE — TELEPHONE ENCOUNTER
Patient request call back form  in regards to something that was discuss t her last visit Only wants to talk to dr Please advise

## 2024-10-30 ENCOUNTER — CONSULT (OUTPATIENT)
Dept: GASTROENTEROLOGY | Facility: CLINIC | Age: 74
End: 2024-10-30
Payer: MEDICARE

## 2024-10-30 VITALS
HEART RATE: 85 BPM | BODY MASS INDEX: 27.51 KG/M2 | WEIGHT: 127.5 LBS | HEIGHT: 57 IN | DIASTOLIC BLOOD PRESSURE: 74 MMHG | SYSTOLIC BLOOD PRESSURE: 123 MMHG

## 2024-10-30 DIAGNOSIS — K21.9 GASTROESOPHAGEAL REFLUX DISEASE, UNSPECIFIED WHETHER ESOPHAGITIS PRESENT: ICD-10-CM

## 2024-10-30 DIAGNOSIS — Z86.0100 HX OF COLONIC POLYPS: ICD-10-CM

## 2024-10-30 DIAGNOSIS — Z80.0 FAMILY HX OF COLON CANCER: Primary | ICD-10-CM

## 2024-10-30 PROCEDURE — 99204 OFFICE O/P NEW MOD 45 MIN: CPT | Performed by: NURSE PRACTITIONER

## 2024-10-30 NOTE — ASSESSMENT & PLAN NOTE
Patient has history of colon polyps.  Last colonoscopy done 5/14/2019.  Colonoscopy showed suboptimal colon prep was suboptimal exam.  Flat lesion and small polyp could have been missed.  Orders:    polyethylene glycol (GOLYTELY) 4000 mL solution; Take 4,000 mL by mouth once for 1 dose Take as directed by office prior to procedure    Colonoscopy; Future

## 2024-10-30 NOTE — PATIENT INSTRUCTIONS
Scheduled date of EGD/colonoscopy (as of today):01/09/25  Physician performing EGD/colonoscopy:Dr. Vallecillo  Location of EGD/colonoscopy: [facility]  Desired bowel prep reviewed with patient:Emmanuel  Instructions reviewed with patient by:tramaine  Clearances:   n/a

## 2024-10-30 NOTE — ASSESSMENT & PLAN NOTE
Family history of colon cancer father diagnosed at age 57.  Orders:    polyethylene glycol (GOLYTELY) 4000 mL solution; Take 4,000 mL by mouth once for 1 dose Take as directed by office prior to procedure    Colonoscopy; Future

## 2024-10-30 NOTE — ASSESSMENT & PLAN NOTE
Patient has history of GERD.  Patient reports GERD symptoms are well-controlled on famotidine.  Orders:    EGD; Future  -Continue famotidine  -Continue to follow antireflux diet and measures

## 2024-10-30 NOTE — PROGRESS NOTES
Ambulatory Visit  Name: Olivia Reid      : 1950      MRN: [id number]  Encounter Provider: TAMICA Oro  Encounter Date: 10/30/2024   Encounter department: [facility] GASTROENTEROLOG[facility] [address]    Assessment & Plan  Family hx of colon cancer  Family history of colon cancer father diagnosed at age 57.  Orders:    polyethylene glycol (GOLYTELY) 4000 mL solution; Take 4,000 mL by mouth once for 1 dose Take as directed by office prior to procedure    Colonoscopy; Future    Hx of colonic polyps  Patient has history of colon polyps.  Last colonoscopy done 2019.  Colonoscopy showed suboptimal colon prep was suboptimal exam.  Flat lesion and small polyp could have been missed.  Orders:    polyethylene glycol (GOLYTELY) 4000 mL solution; Take 4,000 mL by mouth once for 1 dose Take as directed by office prior to procedure    Colonoscopy; Future    Gastroesophageal reflux disease, unspecified whether esophagitis present  Patient has history of GERD.  Patient reports GERD symptoms are well-controlled on famotidine.  Orders:    EGD; Future  -Continue famotidine  -Continue to follow antireflux diet and measures      History of Present Illness     Olivia Reid is a 74 y.o. female  with past medical history of hypertension, age-related osteoporosis, arthritis, dermatitis, iron deficiency anemia, history of colon polyps, family history of colon cancer, prediabetes, hyperlipidemia and vitamin D insufficiency who presents to office as consult.  Patient denies any GI issues.  Patient reports that GERD symptoms are well-controlled on famotidine.  Patient denies nausea, vomiting, acid reflux, heartburn, epigastric or abdominal pain.  Patient denies blood in stool, bright red blood from rectal area, or black tarry stool.  Patient does report since starting iron supplements her stool is dark at times.  Abdomen exam benign no abdominal tenderness or guarding.  Reviewed lab work  done 8/1 which showed normal CMP.  Hemoglobin A1c 5.7.  CBC done 6/12 within normal limits except eosinophils 19.    EGD and colonoscopy done 5/14/2019 showed gastroesophageal reflux disease without any evidence of Zuluaga's esophagus.  Gastric polyp.  Gastritis.  Suboptimal colon prep was suboptimal optimal examination.  Biopsy of stomach antrum showed unremarkable gastric mucosa.  Lower esophagus biopsy showed unremarkable esophageal mucosa.  Polyps showed fundic gland polyps.  Patient is on no blood thinners or antiplatelet medication.      Review of Systems   Constitutional:  Negative for chills and fever.   HENT:  Negative for ear pain and sore throat.    Eyes:  Negative for pain and visual disturbance.   Respiratory:  Negative for cough and shortness of breath.    Cardiovascular:  Negative for chest pain and palpitations.   Gastrointestinal:  Negative for abdominal pain and vomiting.   Genitourinary:  Negative for dysuria and hematuria.   Musculoskeletal:  Negative for arthralgias and back pain.   Skin:  Negative for color change and rash.   Neurological:  Negative for seizures and syncope.   All other systems reviewed and are negative.    Medical History Reviewed by provider this encounter:  Tobacco  Allergies  Meds  Problems  Med Hx  Surg Hx  Fam Hx       Past Medical History   Past Medical History:   Diagnosis Date    Anemia     Arthritis     Colon polyp     GERD (gastroesophageal reflux disease)     Hernia, umbilical     Hypertension     Spinal stenosis      Past Surgical History:   Procedure Laterality Date    BREAST CYST EXCISION Left 1998    benign    COLONOSCOPY      FOOT SURGERY      Toe    HERNIA REPAIR      HYSTERECTOMY      MAMMO (HISTORICAL)  2017    TONSILLECTOMY      UPPER GASTROINTESTINAL ENDOSCOPY       Family History   Problem Relation Age of Onset    Hypertension Mother     Cancer Father     Colon cancer Father 57    No Known Problems Sister     Breast cancer Maternal Grandmother          not sure of her age of onset    No Known Problems Sister     No Known Problems Maternal Aunt     No Known Problems Maternal Aunt     No Known Problems Maternal Aunt     No Known Problems Maternal Aunt     No Known Problems Paternal Aunt     No Known Problems Paternal Aunt      Current Outpatient Medications on File Prior to Visit   Medication Sig Dispense Refill    amLODIPine (NORVASC) 5 mg tablet TAKE 1 TABLET(5 MG) BY MOUTH DAILY 90 tablet 3    baclofen 20 mg tablet TAKE 1 TABLET BY MOUTH FOUR TIMES DAILY 120 tablet 6    Calcium Carbonate-Vitamin D 600-200 MG-UNIT TABS Take 1 tablet by mouth in the morning      celecoxib (CeleBREX) 200 mg capsule TAKE 1 CAPSULE(200 MG) BY MOUTH DAILY 30 capsule 5    Cholecalciferol (VITAMIN D PO) Take 3,000 Units by mouth      denosumab (PROLIA) 60 mg/mL Inject 60 mg under the skin every 6 (six) months      diphenoxylate-atropine (LOMOTIL) 2.5-0.025 mg per tablet TAKE 1 TABLET BY MOUTH FOUR TIMES DAILY AS NEEDED FOR DIARRHEA 40 tablet 3    famotidine (PEPCID) 20 mg tablet Take 1 tablet (20 mg total) by mouth 2 (two) times a day 100 tablet 3    Ferrous Sulfate (IRON PO) Take 65 mg by mouth daily      levothyroxine 50 mcg tablet TAKE 1 TABLET BY MOUTH EVERY DAY AS DIRECTED 90 tablet 1    potassium chloride (MICRO-K) 10 MEQ CR capsule TAKE 2 CAPSULES BY MOUTH TWICE DAILY AS DIRECTED 360 capsule 1    rosuvastatin (CRESTOR) 20 MG tablet TAKE 1 TABLET BY MOUTH EVERY DAY AS DIRECTED 90 tablet 1    telmisartan (MICARDIS) 80 MG tablet TAKE 1 TABLET(80 MG) BY MOUTH DAILY 90 tablet 1    triamcinolone (KENALOG) 0.1 % cream Apply topically 2 (two) times a day 45 g 1    zolpidem (AMBIEN) 5 mg tablet Take 5 mg by mouth daily at bedtime as needed      benzonatate (TESSALON PERLES) 100 mg capsule Take 1 capsule (100 mg total) by mouth 3 (three) times a day as needed for cough (Patient not taking: Reported on 3/27/2024) 20 capsule 0     No current facility-administered medications on file prior  to visit.     Allergies   Allergen Reactions    Aspirin Hives    Hydrochlorothiazide Diarrhea    Ibuprofen Hives      Current Outpatient Medications on File Prior to Visit   Medication Sig Dispense Refill    amLODIPine (NORVASC) 5 mg tablet TAKE 1 TABLET(5 MG) BY MOUTH DAILY 90 tablet 3    baclofen 20 mg tablet TAKE 1 TABLET BY MOUTH FOUR TIMES DAILY 120 tablet 6    Calcium Carbonate-Vitamin D 600-200 MG-UNIT TABS Take 1 tablet by mouth in the morning      celecoxib (CeleBREX) 200 mg capsule TAKE 1 CAPSULE(200 MG) BY MOUTH DAILY 30 capsule 5    Cholecalciferol (VITAMIN D PO) Take 3,000 Units by mouth      denosumab (PROLIA) 60 mg/mL Inject 60 mg under the skin every 6 (six) months      diphenoxylate-atropine (LOMOTIL) 2.5-0.025 mg per tablet TAKE 1 TABLET BY MOUTH FOUR TIMES DAILY AS NEEDED FOR DIARRHEA 40 tablet 3    famotidine (PEPCID) 20 mg tablet Take 1 tablet (20 mg total) by mouth 2 (two) times a day 100 tablet 3    Ferrous Sulfate (IRON PO) Take 65 mg by mouth daily      levothyroxine 50 mcg tablet TAKE 1 TABLET BY MOUTH EVERY DAY AS DIRECTED 90 tablet 1    potassium chloride (MICRO-K) 10 MEQ CR capsule TAKE 2 CAPSULES BY MOUTH TWICE DAILY AS DIRECTED 360 capsule 1    rosuvastatin (CRESTOR) 20 MG tablet TAKE 1 TABLET BY MOUTH EVERY DAY AS DIRECTED 90 tablet 1    telmisartan (MICARDIS) 80 MG tablet TAKE 1 TABLET(80 MG) BY MOUTH DAILY 90 tablet 1    triamcinolone (KENALOG) 0.1 % cream Apply topically 2 (two) times a day 45 g 1    zolpidem (AMBIEN) 5 mg tablet Take 5 mg by mouth daily at bedtime as needed      benzonatate (TESSALON PERLES) 100 mg capsule Take 1 capsule (100 mg total) by mouth 3 (three) times a day as needed for cough (Patient not taking: Reported on 3/27/2024) 20 capsule 0     No current facility-administered medications on file prior to visit.      Social History     Tobacco Use    Smoking status: Never    Smokeless tobacco: Never   Vaping Use    Vaping status: Never Used   Substance and Sexual  "Activity    Alcohol use: No    Drug use: No    Sexual activity: Yes     Partners: Male     Birth control/protection: Post-menopausal         Objective     /74   Pulse 85   Ht 4' 9\" (1.448 m)   Wt 57.8 kg (127 lb 8 oz)   BMI 27.59 kg/m²     Physical Exam  Vitals and nursing note reviewed.   Constitutional:       General: She is not in acute distress.     Appearance: She is well-developed.   HENT:      Head: Normocephalic and atraumatic.   Eyes:      Conjunctiva/sclera: Conjunctivae normal.   Cardiovascular:      Rate and Rhythm: Normal rate and regular rhythm.      Pulses: Normal pulses.      Heart sounds: Murmur heard.   Pulmonary:      Effort: Pulmonary effort is normal. No respiratory distress.      Breath sounds: Normal breath sounds. No stridor. No wheezing, rhonchi or rales.   Abdominal:      General: Bowel sounds are normal. There is no distension.      Palpations: Abdomen is soft. There is no mass.      Tenderness: There is no abdominal tenderness. There is no guarding or rebound.   Musculoskeletal:         General: No swelling.      Cervical back: Neck supple.      Right lower leg: No edema.      Left lower leg: No edema.   Skin:     General: Skin is warm and dry.      Capillary Refill: Capillary refill takes less than 2 seconds.      Coloration: Skin is not jaundiced or pale.   Neurological:      Mental Status: She is alert and oriented to person, place, and time.   Psychiatric:         Mood and Affect: Mood normal.         "

## 2024-11-02 DIAGNOSIS — E78.2 MIXED HYPERLIPIDEMIA: ICD-10-CM

## 2024-11-02 DIAGNOSIS — E03.9 HYPOTHYROIDISM, UNSPECIFIED TYPE: ICD-10-CM

## 2024-11-02 RX ORDER — ROSUVASTATIN CALCIUM 20 MG/1
TABLET, COATED ORAL
Qty: 90 TABLET | Refills: 1 | Status: SHIPPED | OUTPATIENT
Start: 2024-11-02

## 2024-11-02 RX ORDER — LEVOTHYROXINE SODIUM 50 UG/1
TABLET ORAL
Qty: 90 TABLET | Refills: 1 | Status: SHIPPED | OUTPATIENT
Start: 2024-11-02

## 2024-12-02 DIAGNOSIS — M62.838 MUSCLE SPASM OF BOTH LOWER LEGS: ICD-10-CM

## 2024-12-02 RX ORDER — BACLOFEN 20 MG/1
20 TABLET ORAL 4 TIMES DAILY
Qty: 120 TABLET | Refills: 6 | Status: SHIPPED | OUTPATIENT
Start: 2024-12-02

## 2024-12-05 ENCOUNTER — APPOINTMENT (OUTPATIENT)
Dept: LAB | Facility: CLINIC | Age: 74
End: 2024-12-05
Payer: MEDICARE

## 2024-12-05 DIAGNOSIS — D47.2 MGUS (MONOCLONAL GAMMOPATHY OF UNKNOWN SIGNIFICANCE): ICD-10-CM

## 2024-12-05 DIAGNOSIS — E55.9 VITAMIN D INSUFFICIENCY: ICD-10-CM

## 2024-12-05 LAB
25(OH)D3 SERPL-MCNC: 38.6 NG/ML (ref 30–100)
ALBUMIN SERPL BCG-MCNC: 4.2 G/DL (ref 3.5–5)
ALP SERPL-CCNC: 78 U/L (ref 34–104)
ALT SERPL W P-5'-P-CCNC: 10 U/L (ref 7–52)
ANION GAP SERPL CALCULATED.3IONS-SCNC: 12 MMOL/L (ref 4–13)
AST SERPL W P-5'-P-CCNC: 16 U/L (ref 13–39)
BASOPHILS # BLD AUTO: 0.06 THOUSANDS/ÂΜL (ref 0–0.1)
BASOPHILS NFR BLD AUTO: 1 % (ref 0–1)
BILIRUB SERPL-MCNC: 0.5 MG/DL (ref 0.2–1)
BUN SERPL-MCNC: 21 MG/DL (ref 5–25)
CALCIUM SERPL-MCNC: 9.9 MG/DL (ref 8.4–10.2)
CHLORIDE SERPL-SCNC: 101 MMOL/L (ref 96–108)
CO2 SERPL-SCNC: 23 MMOL/L (ref 21–32)
CREAT SERPL-MCNC: 0.8 MG/DL (ref 0.6–1.3)
EOSINOPHIL # BLD AUTO: 0.33 THOUSAND/ÂΜL (ref 0–0.61)
EOSINOPHIL NFR BLD AUTO: 4 % (ref 0–6)
ERYTHROCYTE [DISTWIDTH] IN BLOOD BY AUTOMATED COUNT: 13.9 % (ref 11.6–15.1)
GFR SERPL CREATININE-BSD FRML MDRD: 73 ML/MIN/1.73SQ M
GLUCOSE P FAST SERPL-MCNC: 93 MG/DL (ref 65–99)
HCT VFR BLD AUTO: 42.7 % (ref 34.8–46.1)
HGB BLD-MCNC: 13.2 G/DL (ref 11.5–15.4)
IGA SERPL-MCNC: 100 MG/DL (ref 66–433)
IGG SERPL-MCNC: 1944 MG/DL (ref 635–1741)
IGM SERPL-MCNC: 62 MG/DL (ref 45–281)
IMM GRANULOCYTES # BLD AUTO: 0.03 THOUSAND/UL (ref 0–0.2)
IMM GRANULOCYTES NFR BLD AUTO: 0 % (ref 0–2)
LYMPHOCYTES # BLD AUTO: 1.13 THOUSANDS/ÂΜL (ref 0.6–4.47)
LYMPHOCYTES NFR BLD AUTO: 15 % (ref 14–44)
MCH RBC QN AUTO: 28.7 PG (ref 26.8–34.3)
MCHC RBC AUTO-ENTMCNC: 30.9 G/DL (ref 31.4–37.4)
MCV RBC AUTO: 93 FL (ref 82–98)
MONOCYTES # BLD AUTO: 0.6 THOUSAND/ÂΜL (ref 0.17–1.22)
MONOCYTES NFR BLD AUTO: 8 % (ref 4–12)
NEUTROPHILS # BLD AUTO: 5.51 THOUSANDS/ÂΜL (ref 1.85–7.62)
NEUTS SEG NFR BLD AUTO: 72 % (ref 43–75)
NRBC BLD AUTO-RTO: 0 /100 WBCS
PLATELET # BLD AUTO: 517 THOUSANDS/UL (ref 149–390)
PMV BLD AUTO: 9 FL (ref 8.9–12.7)
POTASSIUM SERPL-SCNC: 3.7 MMOL/L (ref 3.5–5.3)
PROT SERPL-MCNC: 8.9 G/DL (ref 6.4–8.4)
RBC # BLD AUTO: 4.6 MILLION/UL (ref 3.81–5.12)
SODIUM SERPL-SCNC: 136 MMOL/L (ref 135–147)
WBC # BLD AUTO: 7.66 THOUSAND/UL (ref 4.31–10.16)

## 2024-12-05 PROCEDURE — 85025 COMPLETE CBC W/AUTO DIFF WBC: CPT

## 2024-12-05 PROCEDURE — 36415 COLL VENOUS BLD VENIPUNCTURE: CPT

## 2024-12-05 PROCEDURE — 86334 IMMUNOFIX E-PHORESIS SERUM: CPT

## 2024-12-05 PROCEDURE — 82784 ASSAY IGA/IGD/IGG/IGM EACH: CPT

## 2024-12-05 PROCEDURE — 83521 IG LIGHT CHAINS FREE EACH: CPT

## 2024-12-05 PROCEDURE — 80053 COMPREHEN METABOLIC PANEL: CPT

## 2024-12-05 PROCEDURE — 84165 PROTEIN E-PHORESIS SERUM: CPT

## 2024-12-05 PROCEDURE — 82306 VITAMIN D 25 HYDROXY: CPT

## 2024-12-06 LAB
ALBUMIN SERPL ELPH-MCNC: 3.92 G/DL (ref 3.2–5.1)
ALBUMIN SERPL ELPH-MCNC: 45.1 % (ref 48–70)
ALPHA1 GLOB SERPL ELPH-MCNC: 0.46 G/DL (ref 0.15–0.47)
ALPHA1 GLOB SERPL ELPH-MCNC: 5.3 % (ref 1.8–7)
ALPHA2 GLOB SERPL ELPH-MCNC: 1.04 G/DL (ref 0.42–1.04)
ALPHA2 GLOB SERPL ELPH-MCNC: 12 % (ref 5.9–14.9)
BETA GLOB ABNORMAL SERPL ELPH-MCNC: 0.54 G/DL (ref 0.31–0.57)
BETA1 GLOB SERPL ELPH-MCNC: 6.2 % (ref 4.7–7.7)
BETA2 GLOB SERPL ELPH-MCNC: 9 % (ref 3.1–7.9)
BETA2+GAMMA GLOB SERPL ELPH-MCNC: 0.78 G/DL (ref 0.2–0.58)
GAMMA GLOB ABNORMAL SERPL ELPH-MCNC: 1.95 G/DL (ref 0.4–1.66)
GAMMA GLOB SERPL ELPH-MCNC: 22.4 % (ref 6.9–22.3)
IGG/ALB SER: 0.82 {RATIO} (ref 1.1–1.8)
INTERPRETATION UR IFE-IMP: NORMAL
M PROTEIN 1 MFR SERPL ELPH: 4.7 %
M PROTEIN 1 SERPL ELPH-MCNC: 0.41 G/DL
PROT PATTERN SERPL ELPH-IMP: ABNORMAL
PROT SERPL-MCNC: 8.7 G/DL (ref 6.4–8.2)

## 2024-12-06 PROCEDURE — 84165 PROTEIN E-PHORESIS SERUM: CPT | Performed by: PATHOLOGY

## 2024-12-06 PROCEDURE — 86334 IMMUNOFIX E-PHORESIS SERUM: CPT | Performed by: PATHOLOGY

## 2024-12-09 LAB
KAPPA LC FREE SER-MCNC: 54 MG/L (ref 3.3–19.4)
KAPPA LC FREE/LAMBDA FREE SER: 2.13 {RATIO} (ref 0.26–1.65)
LAMBDA LC FREE SERPL-MCNC: 25.4 MG/L (ref 5.7–26.3)

## 2024-12-13 ENCOUNTER — OFFICE VISIT (OUTPATIENT)
Dept: HEMATOLOGY ONCOLOGY | Facility: CLINIC | Age: 74
End: 2024-12-13
Payer: MEDICARE

## 2024-12-13 VITALS
SYSTOLIC BLOOD PRESSURE: 110 MMHG | BODY MASS INDEX: 27.4 KG/M2 | DIASTOLIC BLOOD PRESSURE: 82 MMHG | HEIGHT: 57 IN | TEMPERATURE: 97.2 F | RESPIRATION RATE: 18 BRPM | WEIGHT: 127 LBS | HEART RATE: 65 BPM | OXYGEN SATURATION: 100 %

## 2024-12-13 DIAGNOSIS — D47.2 MGUS (MONOCLONAL GAMMOPATHY OF UNKNOWN SIGNIFICANCE): Primary | ICD-10-CM

## 2024-12-13 DIAGNOSIS — I10 PRIMARY HYPERTENSION: ICD-10-CM

## 2024-12-13 DIAGNOSIS — M81.0 AGE-RELATED OSTEOPOROSIS WITHOUT CURRENT PATHOLOGICAL FRACTURE: ICD-10-CM

## 2024-12-13 DIAGNOSIS — D50.9 IRON DEFICIENCY ANEMIA, UNSPECIFIED IRON DEFICIENCY ANEMIA TYPE: ICD-10-CM

## 2024-12-13 DIAGNOSIS — E03.9 ACQUIRED HYPOTHYROIDISM: ICD-10-CM

## 2024-12-13 DIAGNOSIS — M19.90 ARTHRITIS: ICD-10-CM

## 2024-12-13 PROCEDURE — 99214 OFFICE O/P EST MOD 30 MIN: CPT | Performed by: INTERNAL MEDICINE

## 2024-12-13 PROCEDURE — G2211 COMPLEX E/M VISIT ADD ON: HCPCS | Performed by: INTERNAL MEDICINE

## 2024-12-13 NOTE — PATIENT INSTRUCTIONS
Blood work prior to next visit in 6 months.  She will continue taking 1 iron tablet every other day as before.

## 2024-12-13 NOTE — ASSESSMENT & PLAN NOTE
Stable small IgG kappa MGUS and spike is 410 mg  Orders:  •  CBC and differential; Future  •  Comprehensive metabolic panel; Future  •  IgG, IgA, IgM; Future  •  Immunoglobulin free LT chains blood; Future  •  Protein electrophoresis, serum; Future

## 2024-12-13 NOTE — PROGRESS NOTES
Name: Olivia Reid      : 1950      MRN: [id number]  Encounter Provider: Carlos Manuel Elizalde MD  Encounter Date: 2024   Encounter department: [facility] [place]  :  Assessment & Plan  MGUS (monoclonal gammopathy of unknown significance)  Stable small IgG kappa MGUS and spike is 410 mg  Orders:  •  CBC and differential; Future  •  Comprehensive metabolic panel; Future  •  IgG, IgA, IgM; Future  •  Immunoglobulin free LT chains blood; Future  •  Protein electrophoresis, serum; Future    Iron deficiency anemia, unspecified iron deficiency anemia type  Patient is scheduled to have EGD and colonoscopy on 2025  Orders:  •  Ferritin; Future    Age-related osteoporosis without current pathological fracture  Patient has been taking vitamin D and calcium and she gets Prolia from her family physician.       Acquired hypothyroidism  Being managed by PCP       Primary hypertension  Being managed by PCP       Arthritis  Being managed by PCP       Blood work prior to next visit in 6 months.  She will continue taking 1 iron tablet every other day as before.  Hematologically stable.  MGUS parameters will be monitored.  No therapy needed for MGUS at this time.  Risk of evolution into multiple myeloma could be 1 %/year.  Explained that to patient and her .  Discussed eating healthy diet.  To keep moving her legs to prevent DVT.  Patient to avoid falls and trauma.  Discussed self breast examination and health screening tests.  Patient needs assistance in her care.  Goal is surveillance for MGUS.  All discussed in detail.  Questions answered.  Patient will continue to follow-up with her primary physician and other consultants.  Provided counseling and support.  I used a dictation device to dictate this note and there could be mistakes in my note and for that patient may contact my office    History of Present Illness   Chief Complaint   Patient presents with   • Follow-up  "  Olivia Reid is a 74 y.o. female.  Patient is here with her .  She is in a wheelchair.  She is being followed for IgG kappa MGUS M spike is small and stable for at 10 mg.  MGUS parameters are being monitored.  Prior history of iron deficiency anemia and she is not anemic anymore.  She is scheduled to have EGD and colonoscopy in January 2025.  History of colon polyp.  She has been taking iron tablet.  Previous history of JAK2 mutation negative thrombocytosis that improved 1 iron deficiency improved but she has thrombocytosis again and platelet count is 517,000.  Blood counts will be monitored.  Has osteoporosis and has been on vitamin D and calcium and Prolia.  Has arthritic symptoms she follows with a rheumatologist.  Has some tiredness.                   Current Medications       Pertinent Medical History   See details above in HPI    Review of Systems    Reviewed 12 systems.  Symptoms are in HPI.  No other symptoms on review of systems other than listed in HPI.      Objective   /82 (BP Location: Right arm, Patient Position: Sitting, Cuff Size: Adult)   Pulse 65   Temp (!) 97.2 °F (36.2 °C) (Temporal)   Resp 18   Ht 4' 9\" (1.448 m)   Wt 57.6 kg (127 lb)   SpO2 100%   BMI 27.48 kg/m²     Physical Exam  Constitutional:       General: She is not in acute distress.     Appearance: Normal appearance.   HENT:      Head: Normocephalic and atraumatic.      Mouth/Throat:      Comments: No oral thrush  Eyes:      General: No scleral icterus.  Cardiovascular:      Rate and Rhythm: Normal rate and regular rhythm.      Heart sounds: Normal heart sounds. No murmur heard.  Pulmonary:      Effort: Pulmonary effort is normal. No respiratory distress.      Breath sounds: Normal breath sounds. No rhonchi or rales.   Abdominal:      Palpations: Abdomen is soft.      Tenderness: There is no abdominal tenderness.      Comments: Patient in wheelchair.   Musculoskeletal:      Cervical back: Normal range of " motion.      Right lower leg: No edema.      Left lower leg: No edema.      Comments: Patient in wheelchair.  No calf tenderness.  No palpable lymph node in the axillary areas.  No clubbing.   Lymphadenopathy:      Cervical: No cervical adenopathy.   Skin:     Findings: No bruising or rash.   Neurological:      Mental Status: She is alert and oriented to person, place, and time.      Gait: Gait abnormal (Patient in wheelchair).      Comments: Patient can move all 4 extremities   Psychiatric:         Mood and Affect: Mood normal.         Behavior: Behavior normal.         Labs: I have reviewed the following labs:  Results for orders placed or performed in visit on 12/05/24   CBC and differential   Result Value Ref Range    WBC 7.66 4.31 - 10.16 Thousand/uL    RBC 4.60 3.81 - 5.12 Million/uL    Hemoglobin 13.2 11.5 - 15.4 g/dL    Hematocrit 42.7 34.8 - 46.1 %    MCV 93 82 - 98 fL    MCH 28.7 26.8 - 34.3 pg    MCHC 30.9 (L) 31.4 - 37.4 g/dL    RDW 13.9 11.6 - 15.1 %    MPV 9.0 8.9 - 12.7 fL    Platelets 517 (H) 149 - 390 Thousands/uL    nRBC 0 /100 WBCs    Segmented % 72 43 - 75 %    Immature Grans % 0 0 - 2 %    Lymphocytes % 15 14 - 44 %    Monocytes % 8 4 - 12 %    Eosinophils Relative 4 0 - 6 %    Basophils Relative 1 0 - 1 %    Absolute Neutrophils 5.51 1.85 - 7.62 Thousands/µL    Absolute Immature Grans 0.03 0.00 - 0.20 Thousand/uL    Absolute Lymphocytes 1.13 0.60 - 4.47 Thousands/µL    Absolute Monocytes 0.60 0.17 - 1.22 Thousand/µL    Eosinophils Absolute 0.33 0.00 - 0.61 Thousand/µL    Basophils Absolute 0.06 0.00 - 0.10 Thousands/µL   Comprehensive metabolic panel   Result Value Ref Range    Sodium 136 135 - 147 mmol/L    Potassium 3.7 3.5 - 5.3 mmol/L    Chloride 101 96 - 108 mmol/L    CO2 23 21 - 32 mmol/L    ANION GAP 12 4 - 13 mmol/L    BUN 21 5 - 25 mg/dL    Creatinine 0.80 0.60 - 1.30 mg/dL    Glucose, Fasting 93 65 - 99 mg/dL    Calcium 9.9 8.4 - 10.2 mg/dL    AST 16 13 - 39 U/L    ALT 10 7 - 52 U/L     Alkaline Phosphatase 78 34 - 104 U/L    Total Protein 8.9 (H) 6.4 - 8.4 g/dL    Albumin 4.2 3.5 - 5.0 g/dL    Total Bilirubin 0.50 0.20 - 1.00 mg/dL    eGFR 73 ml/min/1.73sq m   IgG, IgA, IgM   Result Value Ref Range     66 - 433 mg/dL    IGG 1,944 (H) 635 - 1,741 mg/dL    IGM 62 45 - 281 mg/dL   Immunoglobulin free LT chains blood   Result Value Ref Range    Ig Kappa Free Light Chain 54.0 (H) 3.3 - 19.4 mg/L    Ig Lambda Free Light Chain 25.4 5.7 - 26.3 mg/L    Kappa/Lambda FluidC Ratio 2.13 (H) 0.26 - 1.65   Protein electrophoresis, serum   Result Value Ref Range    A/G Ratio 0.82 (L) 1.10 - 1.80    Albumin Electrophoresis 45.1 (L) 48.0 - 70.0 %    Albumin CONC 3.92 3.20 - 5.10 g/dl    Alpha 1 5.3 1.8 - 7.0 %    ALPHA 1 CONC 0.46 0.15 - 0.47 g/dL    Alpha 2 12.0 5.9 - 14.9 %    ALPHA 2 CONC 1.04 0.42 - 1.04 g/dL    Beta-1 6.2 4.7 - 7.7 %    BETA 1 CONC 0.54 0.31 - 0.57 g/dL    Beta-2 9.0 (H) 3.1 - 7.9 %    BETA 2 CONC 0.78 (H) 0.20 - 0.58 g/dL    Gamma Globulin 22.4 (H) 6.9 - 22.3 %    GAMMA CONC 1.95 (H) 0.40 - 1.66 g/dL    M Peak ID 1 4.70 %    M PEAK 1 CONC 0.41 g/dL    Total Protein 8.7 (H) 6.4 - 8.2 g/dL    SPEP Interpretation See Comment    Vitamin D 25 hydroxy   Result Value Ref Range    Vit D, 25-Hydroxy 38.6 30.0 - 100.0 ng/mL   Immunofixation, Serum(Reflex Only-Do Not Order)   Result Value Ref Range    Immunofixation Interpretation See Comment      Component  Ref Range & Units (hover) 12/5/24 12:06 PM   Immunofixation Interpretation See Comment   Comment: Serum immunofixation shows a monoclonal gammopathy identified as  IgG-kappa.  Reviewed by:  Karly Chery MD **Electronic Signature**              Narrative    Serum immunofixation shows a monoclonal gammopathy identified as  IgG-kappa.  Reviewed by:  Karly Chery MD **Electronic Signature**   Specimen Collected: 12/05/24 12:06 PM Last Resulted: 12/06/24  2:25 PM           IMPRESSION:  No mammographic evidence of malignancy.            ASSESSMENT/BI-RADS CATEGORY:  Left: 1 - Negative  Right: 1 - Negative  Overall: 1 - Negative     RECOMMENDATION:       - Routine screening mammogram in 1 year for both breasts.     Workstation ID: [id number]              Imaging    Mammo screening bilateral w 3d & cad (Order: [id number]) - 12/19/2023

## 2024-12-28 DIAGNOSIS — E87.6 HYPOPOTASSEMIA: ICD-10-CM

## 2024-12-30 RX ORDER — POTASSIUM CHLORIDE 750 MG/1
CAPSULE, EXTENDED RELEASE ORAL
Qty: 360 CAPSULE | Refills: 1 | Status: SHIPPED | OUTPATIENT
Start: 2024-12-30

## 2025-01-09 ENCOUNTER — HOSPITAL ENCOUNTER (OUTPATIENT)
Dept: GASTROENTEROLOGY | Facility: HOSPITAL | Age: 75
Setting detail: OUTPATIENT SURGERY
End: 2025-01-09
Payer: MEDICARE

## 2025-01-09 ENCOUNTER — ANESTHESIA EVENT (OUTPATIENT)
Dept: GASTROENTEROLOGY | Facility: HOSPITAL | Age: 75
End: 2025-01-09
Payer: MEDICARE

## 2025-01-09 ENCOUNTER — ANESTHESIA (OUTPATIENT)
Dept: GASTROENTEROLOGY | Facility: HOSPITAL | Age: 75
End: 2025-01-09
Payer: MEDICARE

## 2025-01-09 VITALS
SYSTOLIC BLOOD PRESSURE: 113 MMHG | DIASTOLIC BLOOD PRESSURE: 53 MMHG | BODY MASS INDEX: 27.4 KG/M2 | TEMPERATURE: 98.1 F | OXYGEN SATURATION: 96 % | RESPIRATION RATE: 18 BRPM | HEIGHT: 57 IN | HEART RATE: 93 BPM | WEIGHT: 127 LBS

## 2025-01-09 DIAGNOSIS — K21.9 GASTROESOPHAGEAL REFLUX DISEASE, UNSPECIFIED WHETHER ESOPHAGITIS PRESENT: ICD-10-CM

## 2025-01-09 DIAGNOSIS — Z86.0100 HX OF COLONIC POLYPS: ICD-10-CM

## 2025-01-09 DIAGNOSIS — Z80.0 FAMILY HX OF COLON CANCER: ICD-10-CM

## 2025-01-09 PROCEDURE — 88305 TISSUE EXAM BY PATHOLOGIST: CPT | Performed by: PATHOLOGY

## 2025-01-09 PROCEDURE — G0105 COLORECTAL SCRN; HI RISK IND: HCPCS | Performed by: INTERNAL MEDICINE

## 2025-01-09 PROCEDURE — 43239 EGD BIOPSY SINGLE/MULTIPLE: CPT | Performed by: INTERNAL MEDICINE

## 2025-01-09 PROCEDURE — 43251 EGD REMOVE LESION SNARE: CPT | Performed by: INTERNAL MEDICINE

## 2025-01-09 RX ORDER — LIDOCAINE HYDROCHLORIDE 20 MG/ML
INJECTION, SOLUTION EPIDURAL; INFILTRATION; INTRACAUDAL; PERINEURAL AS NEEDED
Status: DISCONTINUED | OUTPATIENT
Start: 2025-01-09 | End: 2025-01-09

## 2025-01-09 RX ORDER — PROPOFOL 10 MG/ML
INJECTION, EMULSION INTRAVENOUS AS NEEDED
Status: DISCONTINUED | OUTPATIENT
Start: 2025-01-09 | End: 2025-01-09

## 2025-01-09 RX ORDER — SODIUM CHLORIDE, SODIUM LACTATE, POTASSIUM CHLORIDE, CALCIUM CHLORIDE 600; 310; 30; 20 MG/100ML; MG/100ML; MG/100ML; MG/100ML
INJECTION, SOLUTION INTRAVENOUS CONTINUOUS PRN
Status: DISCONTINUED | OUTPATIENT
Start: 2025-01-09 | End: 2025-01-09

## 2025-01-09 RX ADMIN — PROPOFOL 25 MG: 10 INJECTION, EMULSION INTRAVENOUS at 09:54

## 2025-01-09 RX ADMIN — PROPOFOL 100 MG: 10 INJECTION, EMULSION INTRAVENOUS at 09:50

## 2025-01-09 RX ADMIN — PROPOFOL 25 MG: 10 INJECTION, EMULSION INTRAVENOUS at 09:53

## 2025-01-09 RX ADMIN — PROPOFOL 25 MG: 10 INJECTION, EMULSION INTRAVENOUS at 09:57

## 2025-01-09 RX ADMIN — PROPOFOL 25 MG: 10 INJECTION, EMULSION INTRAVENOUS at 09:55

## 2025-01-09 RX ADMIN — SODIUM CHLORIDE, SODIUM LACTATE, POTASSIUM CHLORIDE, AND CALCIUM CHLORIDE: .6; .31; .03; .02 INJECTION, SOLUTION INTRAVENOUS at 09:31

## 2025-01-09 RX ADMIN — LIDOCAINE HYDROCHLORIDE 60 MG: 20 INJECTION, SOLUTION EPIDURAL; INFILTRATION; INTRACAUDAL; PERINEURAL at 09:50

## 2025-01-09 RX ADMIN — PROPOFOL 25 MG: 10 INJECTION, EMULSION INTRAVENOUS at 09:52

## 2025-01-09 RX ADMIN — PROPOFOL 25 MG: 10 INJECTION, EMULSION INTRAVENOUS at 10:00

## 2025-01-09 NOTE — ANESTHESIA POSTPROCEDURE EVALUATION
Post-Op Assessment Note    CV Status:  Stable    Pain management: adequate       Mental Status:  Awake and lethargic   Hydration Status:  Stable   PONV Controlled:  None   Airway Patency:  Patent     Post Op Vitals Reviewed: Yes    No anethesia notable event occurred.    Staff: CRNA           Last Filed PACU Vitals:  Vitals Value Taken Time   Temp     Pulse     BP     Resp     SpO2

## 2025-01-09 NOTE — H&P
History and Physical -  [facility]  Olivia Reid 74 y.o. female MRN: [id number]                  HPI: Olivia Reid is a 74 y.o. year old female who presents for history of polyps, GERD      REVIEW OF SYSTEMS: Per the HPI, and otherwise unremarkable.    Historical Information   Past Medical History:   Diagnosis Date    Anemia     Arthritis     Colon polyp     GERD (gastroesophageal reflux disease)     Hernia, umbilical     Hypertension     Spinal stenosis      Past Surgical History:   Procedure Laterality Date    BREAST CYST EXCISION Left 1998    benign    COLONOSCOPY      FOOT SURGERY      Toe    HERNIA REPAIR      HYSTERECTOMY      MAMMO (HISTORICAL)  2017    TONSILLECTOMY      UPPER GASTROINTESTINAL ENDOSCOPY       Social History   Social History     Substance and Sexual Activity   Alcohol Use No     Social History     Substance and Sexual Activity   Drug Use No     Social History     Tobacco Use   Smoking Status Never   Smokeless Tobacco Never     Family History   Problem Relation Age of Onset    Hypertension Mother     Cancer Father     Colon cancer Father 57    No Known Problems Sister     Breast cancer Maternal Grandmother         not sure of her age of onset    No Known Problems Sister     No Known Problems Maternal Aunt     No Known Problems Maternal Aunt     No Known Problems Maternal Aunt     No Known Problems Maternal Aunt     No Known Problems Paternal Aunt     No Known Problems Paternal Aunt        Meds/Allergies       Current Outpatient Medications:     amLODIPine (NORVASC) 5 mg tablet    baclofen 20 mg tablet    benzonatate (TESSALON PERLES) 100 mg capsule    Calcium Carbonate-Vitamin D 600-200 MG-UNIT TABS    celecoxib (CeleBREX) 200 mg capsule    Cholecalciferol (VITAMIN D PO)    denosumab (PROLIA) 60 mg/mL    diphenoxylate-atropine (LOMOTIL) 2.5-0.025 mg per tablet    famotidine (PEPCID) 20 mg tablet    Ferrous Sulfate (IRON PO)    levothyroxine 50 mcg tablet     polyethylene glycol (GOLYTELY) 4000 mL solution    potassium chloride (MICRO-K) 10 MEQ CR capsule    rosuvastatin (CRESTOR) 20 MG tablet    telmisartan (MICARDIS) 80 MG tablet    triamcinolone (KENALOG) 0.1 % cream    zolpidem (AMBIEN) 5 mg tablet    Allergies   Allergen Reactions    Aspirin Hives    Hydrochlorothiazide Diarrhea    Ibuprofen Hives       Objective     There were no vitals taken for this visit.      PHYSICAL EXAM    Gen: NAD  Head: NCAT  CV: RRR  CHEST: Clear  ABD: soft, NT/ND  EXT: no edema      ASSESSMENT/PLAN:  This is a 74 y.o. year old female here for colonoscopy, EGD, and she is stable and optimized for her procedure.

## 2025-01-09 NOTE — ANESTHESIA PREPROCEDURE EVALUATION
Procedure:  COLONOSCOPY  EGD    Relevant Problems   ANESTHESIA   (-) History of anesthesia complications      CARDIO   (+) Essential hypertension   (+) Hypertension   (+) Mixed hyperlipidemia   (-) Chest pain   (-) SARAH (dyspnea on exertion)      ENDO   (+) Hyperparathyroidism (HCC)   (+) Hypothyroidism      GI/HEPATIC   (+) Gastroesophageal reflux disease      HEMATOLOGY   (+) Anemia   (+) Iron deficiency anemia, unspecified      MUSCULOSKELETAL   (+) Arthritis      PULMONARY   (-) Shortness of breath   (-) Sleep apnea   (-) URI (upper respiratory infection)        Physical Exam    Airway    Mallampati score: II  TM Distance: >3 FB  Neck ROM: limited     Dental       Cardiovascular      Pulmonary      Other Findings  post-pubertal.      Anesthesia Plan  ASA Score- 2     Anesthesia Type- IV sedation with anesthesia with ASA Monitors.         Additional Monitors:     Airway Plan:            Plan Factors-Exercise tolerance (METS): >4 METS.    Chart reviewed. EKG reviewed.  Existing labs reviewed. Patient summary reviewed.                  Induction- intravenous.    Postoperative Plan-         Informed Consent- Anesthetic plan and risks discussed with patient.  I personally reviewed this patient with the CRNA. Discussed and agreed on the Anesthesia Plan with the CRNA..

## 2025-01-12 ENCOUNTER — RESULTS FOLLOW-UP (OUTPATIENT)
Dept: FAMILY MEDICINE CLINIC | Facility: CLINIC | Age: 75
End: 2025-01-12

## 2025-01-13 PROCEDURE — 88305 TISSUE EXAM BY PATHOLOGIST: CPT | Performed by: PATHOLOGY

## 2025-01-16 ENCOUNTER — TELEPHONE (OUTPATIENT)
Age: 75
End: 2025-01-16

## 2025-01-16 NOTE — TELEPHONE ENCOUNTER
Per Dr. Ribeiro's note, patient is following with PCP. She has an appointment with her physician at the end of the month. Please request follow up labs from PCP office. Thank you.

## 2025-01-16 NOTE — TELEPHONE ENCOUNTER
Patient calling to ask if she needs to repeat any labs as per her last office visit.     repeat PTH, calcium and vitamin D labs in 6-12 months.       Please advise.

## 2025-01-29 ENCOUNTER — OFFICE VISIT (OUTPATIENT)
Dept: FAMILY MEDICINE CLINIC | Facility: CLINIC | Age: 75
End: 2025-01-29
Payer: MEDICARE

## 2025-01-29 VITALS
BODY MASS INDEX: 27.18 KG/M2 | TEMPERATURE: 97.9 F | HEART RATE: 84 BPM | HEIGHT: 57 IN | OXYGEN SATURATION: 98 % | WEIGHT: 126 LBS | SYSTOLIC BLOOD PRESSURE: 132 MMHG | DIASTOLIC BLOOD PRESSURE: 76 MMHG

## 2025-01-29 DIAGNOSIS — R63.8 INCREASED BMI: ICD-10-CM

## 2025-01-29 DIAGNOSIS — E21.3 HYPERPARATHYROIDISM (HCC): ICD-10-CM

## 2025-01-29 DIAGNOSIS — K58.0 IRRITABLE BOWEL SYNDROME WITH DIARRHEA: ICD-10-CM

## 2025-01-29 DIAGNOSIS — Z12.31 ENCOUNTER FOR SCREENING MAMMOGRAM FOR BREAST CANCER: ICD-10-CM

## 2025-01-29 DIAGNOSIS — L60.2 HYPERTROPHIC TOENAIL: ICD-10-CM

## 2025-01-29 DIAGNOSIS — R73.03 PRE-DIABETES: ICD-10-CM

## 2025-01-29 DIAGNOSIS — Z79.899 MEDICATION MANAGEMENT: ICD-10-CM

## 2025-01-29 DIAGNOSIS — I10 PRIMARY HYPERTENSION: ICD-10-CM

## 2025-01-29 DIAGNOSIS — M35.3 POLYMYALGIA RHEUMATICA (HCC): ICD-10-CM

## 2025-01-29 DIAGNOSIS — R69 MULTIPLE MEDICAL PROBLEMS: Primary | ICD-10-CM

## 2025-01-29 DIAGNOSIS — D47.2 MGUS (MONOCLONAL GAMMOPATHY OF UNKNOWN SIGNIFICANCE): ICD-10-CM

## 2025-01-29 DIAGNOSIS — D75.839 THROMBOCYTOSIS: ICD-10-CM

## 2025-01-29 DIAGNOSIS — E03.9 ACQUIRED HYPOTHYROIDISM: ICD-10-CM

## 2025-01-29 DIAGNOSIS — M19.90 ARTHRITIS: ICD-10-CM

## 2025-01-29 PROCEDURE — G2211 COMPLEX E/M VISIT ADD ON: HCPCS | Performed by: FAMILY MEDICINE

## 2025-01-29 PROCEDURE — 99215 OFFICE O/P EST HI 40 MIN: CPT | Performed by: FAMILY MEDICINE

## 2025-01-29 NOTE — PROGRESS NOTES
Name: Olivia Reid      : 1950      MRN: [id number]  Encounter Provider: GLORIA Ga DO  Encounter Date: 2025   Encounter department: [facility]    Assessment & Plan  Multiple medical problems  Patient has multiple medical problems as delineated in the visit diagnosis list each and everyone discussed       Encounter for screening mammogram for breast cancer  Mammogram ordered but not due for several months  Orders:    Mammo screening bilateral w 3d and cad; Future    Polymyalgia rheumatica (HCC)  Follows with Dr. Thompson every 6 months       Hyperparathyroidism ([facility])  Seeing endocrinologist Dr. Ribeiro--Labs ordered       Hypertrophic toenail  Request referral to podiatrist--placed to Dr. Vasquez  Orders:    Ambulatory referral to Podiatry; Future    Primary hypertension  Blood pressure controlled at 132/76 taking amlodipine 5 and telmisartan 80 tolerating well       Irritable bowel syndrome with diarrhea  Getting by with over-the-counter as needed medication       Acquired hypothyroidism  Tolerating levothyroxine 50 mcg once daily will keep check on labs       Arthritis  Continues to be a issue with severe spine pain and mostly wheelchair-bound       MGUS (monoclonal gammopathy of unknown significance)  Follows with heme-onc       Pre-diabetes  Continue to monitor labs and A1c       Thrombocytosis  CBCs all reviewed most recent showed platelet count over 500,000 whereas prior were in the 370,000 range although several years ago in the 6-700,000 range heme-onc following       Increased BMI  BMI 27.27 with weight 126 urged to lose weight as she can       Medication management  All medications reviewed for safety efficacy and tolerance and each and every medication discussed         Assessment & Plan  1. Hyperparathyroidism.  - Continue care with endocrinologist Dr. Dukes. Laboratory tests ordered.    2. Polymyalgia rheumatica.  - Continue follow-up with Dr. Carbajal  every 6 months.    3. Onychomycosis.  - Referral to Dr. Rios Vasquez, podiatrist, for further evaluation and management.    4. Age-related osteoporosis.  - Continue care with Dr. Dukes. Laboratory tests ordered.    5. Vitamin D deficiency.  - Continue care with Dr. Dukes. Laboratory tests ordered.    6. Hypercalcemia.  - Continue care with Dr. Dukes. Laboratory tests ordered.    7. Elevated platelet count.  - Platelet count increased to 517,000 from previous counts of 373,000, 386,000, and 382,000. Monitor closely with follow-up CBC in 6 months.    8. Hyperplastic polyps.  - Three hyperplastic polyps found in the stomach. No H. pylori identified.    9. Mild chronic inactive gastritis and reactive gastropathy.  - No H. pylori identified.    10. Health maintenance.  - Mammogram ordered. Advised to schedule at [facility].    Follow-up  - Follow-up CBC in 6 months to monitor platelet count.    PROCEDURE  EGD showed esophagus normal, 2 cm hiatal hernia, three polyps in the body of the stomach, duodenum appeared normal.    Depression Screening and Follow-up Plan: Patient was screened for depression during today's encounter. They screened negative with a PHQ-2 score of 0.        History of Present Illness     History of Present Illness  The patient is a 74-year-old female who presents for a 3-month follow-up visit.    Elevated Calcium Levels  - Under the care of Dr. Dukes, an endocrinologist  - Care transitioned back to primary care physician  - No communication regarding blood work results from the previous year    Gastrointestinal Ulcers  - Underwent an EGD in 11/2024  - 3 polyps removed from her stomach  - Red spot identified and tested for H. pylori  - Continues to take famotidine 20 mg once daily    Podiatry Referral  - Expressed interest due to thickening of toenails  - Finds toenails difficult to manage    Mammogram Screenings  - Compliant with screenings as ordered by Dr. Echavarria  -  Scheduled for next mammogram in 12/2025    Colonoscopy  - History of inadequate bowel preparation  - Incomplete visualization of the upper colon  - 3 polyps removed from the lower bowel  - Scheduled for a repeat colonoscopy in 6 months    Polymyalgia Rheumatica  - Follows up with Dr. Carbajal every 6 months    Hyperparathyroidism  - Follows up with Dr. Dukes every 6 months    Vitamin D Deficiency  - Follows up with Dr. Dukes every 6 months    Age-Related Osteoporosis  - Follows up with Dr. Dukes every 6 months    MEDICATIONS  Current: Amlodipine, famotidine, levothyroxine, potassium chloride, rosuvastatin, telmisartan, zolpidem, Celebrex     Review of Systems   Constitutional:  Negative for activity change, appetite change, chills, diaphoresis, fatigue and fever.   HENT:  Negative for congestion, ear discharge, ear pain, facial swelling, nosebleeds, sore throat, tinnitus, trouble swallowing and voice change.    Eyes:  Negative for photophobia, pain, discharge, redness, itching and visual disturbance.   Respiratory:  Negative for apnea, cough, choking, chest tightness and shortness of breath.    Cardiovascular:  Negative for chest pain, palpitations and leg swelling.   Gastrointestinal:  Negative for abdominal distention, abdominal pain, blood in stool, constipation, diarrhea, nausea and vomiting.   Endocrine: Negative for cold intolerance, heat intolerance, polydipsia, polyphagia and polyuria.   Genitourinary:  Negative for decreased urine volume, difficulty urinating, dysuria, enuresis, frequency, hematuria, pelvic pain, urgency and vaginal bleeding.   Musculoskeletal:  Negative for arthralgias, back pain, gait problem, joint swelling, neck pain and neck stiffness.   Skin:  Negative for color change, pallor and rash.   Allergic/Immunologic: Negative for immunocompromised state.   Neurological:  Negative for dizziness, seizures, facial asymmetry, light-headedness, numbness and headaches.  "  Hematological:  Negative for adenopathy.   Psychiatric/Behavioral:  Negative for agitation, behavioral problems, confusion, decreased concentration, dysphoric mood and hallucinations.      Objective   /76 (BP Location: Left arm, Patient Position: Sitting, Cuff Size: Standard)   Pulse 84   Temp 97.9 °F (36.6 °C) (Temporal)   Ht 4' 9\" (1.448 m)   Wt 57.2 kg (126 lb)   SpO2 98%   BMI 27.27 kg/m²     Physical Exam  Vital Signs  Blood pressure is normal. Weight is 126. BMI is 27.  Physical Exam  Vitals and nursing note reviewed.   Constitutional:       General: She is not in acute distress.     Appearance: She is well-developed.   HENT:      Head: Normocephalic and atraumatic.   Eyes:      Conjunctiva/sclera: Conjunctivae normal.   Cardiovascular:      Rate and Rhythm: Normal rate and regular rhythm.      Heart sounds: No murmur heard.  Pulmonary:      Effort: Pulmonary effort is normal. No respiratory distress.      Breath sounds: Normal breath sounds.   Abdominal:      Palpations: Abdomen is soft.      Tenderness: There is no abdominal tenderness.   Musculoskeletal:         General: No swelling.      Cervical back: Neck supple.   Skin:     General: Skin is warm and dry.      Capillary Refill: Capillary refill takes less than 2 seconds.   Neurological:      Mental Status: She is alert.   Psychiatric:         Mood and Affect: Mood normal.         Behavior: Behavior normal.         Thought Content: Thought content normal.         Judgment: Judgment normal.       Administrative Statements   I have spent a total time of 40 minutes in caring for this patient on the day of the visit/encounter including Diagnostic results, Prognosis, Risks and benefits of tx options, Instructions for management, Patient and family education, Importance of tx compliance, Risk factor reductions, Impressions, Counseling / Coordination of care, Documenting in the medical record, Reviewing / ordering tests, medicine, procedures  , and " Obtaining or reviewing history  .

## 2025-01-30 NOTE — ASSESSMENT & PLAN NOTE
All medications reviewed for safety efficacy and tolerance and each and every medication discussed

## 2025-02-02 DIAGNOSIS — M19.90 ARTHRITIS: ICD-10-CM

## 2025-02-03 DIAGNOSIS — M19.90 ARTHRITIS: ICD-10-CM

## 2025-02-03 RX ORDER — CELECOXIB 200 MG/1
CAPSULE ORAL
Qty: 30 CAPSULE | Refills: 5 | Status: SHIPPED | OUTPATIENT
Start: 2025-02-03 | End: 2025-02-04

## 2025-02-04 RX ORDER — CELECOXIB 200 MG/1
200 CAPSULE ORAL DAILY
Qty: 90 CAPSULE | Refills: 1 | Status: SHIPPED | OUTPATIENT
Start: 2025-02-04

## 2025-02-06 ENCOUNTER — RESULTS FOLLOW-UP (OUTPATIENT)
Dept: GASTROENTEROLOGY | Facility: CLINIC | Age: 75
End: 2025-02-06

## 2025-02-06 NOTE — RESULT ENCOUNTER NOTE
Please call the patient regarding results.  Biopsies all benign.  No evidence of H. pylori.  Stomach polyps were hyperplastic.  These are benign but do have a very small risk of progressing to cancer.  Would recommend repeat EGD in 1 year for reevaluation.

## 2025-02-07 ENCOUNTER — TELEPHONE (OUTPATIENT)
Age: 75
End: 2025-02-07

## 2025-02-07 NOTE — TELEPHONE ENCOUNTER
Pt returned Roxy's call and would like a call preferable within an hour to an hour and a half. She will be available this afternoon also. Please reach out to pt

## 2025-02-27 ENCOUNTER — CLINICAL SUPPORT (OUTPATIENT)
Dept: FAMILY MEDICINE CLINIC | Facility: CLINIC | Age: 75
End: 2025-02-27
Payer: MEDICARE

## 2025-02-27 DIAGNOSIS — M81.0 AGE-RELATED OSTEOPOROSIS WITHOUT CURRENT PATHOLOGICAL FRACTURE: Primary | ICD-10-CM

## 2025-02-27 PROCEDURE — 96372 THER/PROPH/DIAG INJ SC/IM: CPT

## 2025-02-27 NOTE — PROGRESS NOTES
Pt here for prolia inj. Provided at this time wo complication subq to L arm. Pt was provided reminder card for next inj.

## 2025-03-20 DIAGNOSIS — K58.0 IRRITABLE BOWEL SYNDROME WITH DIARRHEA: ICD-10-CM

## 2025-03-21 RX ORDER — DIPHENOXYLATE HYDROCHLORIDE AND ATROPINE SULFATE 2.5; .025 MG/1; MG/1
TABLET ORAL
Qty: 40 TABLET | Refills: 3 | Status: SHIPPED | OUTPATIENT
Start: 2025-03-21

## 2025-04-02 ENCOUNTER — HOSPITAL ENCOUNTER (OUTPATIENT)
Facility: HOSPITAL | Age: 75
Discharge: HOME | End: 2025-04-02
Payer: MEDICARE

## 2025-04-02 VITALS — WEIGHT: 126 LBS | HEIGHT: 57 IN | BODY MASS INDEX: 27.18 KG/M2

## 2025-04-02 DIAGNOSIS — Z12.31 ENCOUNTER FOR SCREENING MAMMOGRAM FOR BREAST CANCER: ICD-10-CM

## 2025-04-02 PROCEDURE — 77063 BREAST TOMOSYNTHESIS BI: CPT

## 2025-04-02 PROCEDURE — 77067 SCR MAMMO BI INCL CAD: CPT

## 2025-04-03 DIAGNOSIS — I10 ESSENTIAL HYPERTENSION: ICD-10-CM

## 2025-04-04 RX ORDER — TELMISARTAN 80 MG/1
80 TABLET ORAL DAILY
Qty: 90 TABLET | Refills: 1 | Status: SHIPPED | OUTPATIENT
Start: 2025-04-04

## 2025-04-05 ENCOUNTER — RESULTS FOLLOW-UP (OUTPATIENT)
Dept: FAMILY MEDICINE CLINIC | Facility: CLINIC | Age: 75
End: 2025-04-05

## 2025-04-30 ENCOUNTER — OFFICE VISIT (OUTPATIENT)
Dept: FAMILY MEDICINE CLINIC | Facility: CLINIC | Age: 75
End: 2025-04-30
Payer: MEDICARE

## 2025-04-30 VITALS
TEMPERATURE: 98.2 F | HEART RATE: 82 BPM | SYSTOLIC BLOOD PRESSURE: 114 MMHG | WEIGHT: 126 LBS | DIASTOLIC BLOOD PRESSURE: 70 MMHG | OXYGEN SATURATION: 97 % | BODY MASS INDEX: 27.27 KG/M2

## 2025-04-30 DIAGNOSIS — E03.9 ACQUIRED HYPOTHYROIDISM: ICD-10-CM

## 2025-04-30 DIAGNOSIS — R69 MULTIPLE MEDICAL PROBLEMS: ICD-10-CM

## 2025-04-30 DIAGNOSIS — Z99.89 USES WALKER: ICD-10-CM

## 2025-04-30 DIAGNOSIS — R26.2 AMBULATORY DYSFUNCTION: ICD-10-CM

## 2025-04-30 DIAGNOSIS — R53.1 GENERALIZED WEAKNESS: ICD-10-CM

## 2025-04-30 DIAGNOSIS — I10 ESSENTIAL HYPERTENSION: Primary | ICD-10-CM

## 2025-04-30 DIAGNOSIS — K58.0 IRRITABLE BOWEL SYNDROME WITH DIARRHEA: ICD-10-CM

## 2025-04-30 PROCEDURE — G2211 COMPLEX E/M VISIT ADD ON: HCPCS | Performed by: FAMILY MEDICINE

## 2025-04-30 PROCEDURE — 99214 OFFICE O/P EST MOD 30 MIN: CPT | Performed by: FAMILY MEDICINE

## 2025-04-30 NOTE — PROGRESS NOTES
Name: Olivia Reid      : 1950      MRN: [id number]  Encounter Provider: GLORIA Ga DO  Encounter Date: 2025   Encounter department: [facility]  :  Assessment & Plan  Essential hypertension  Well-controlled at 114/70 taking amlodipine and tells       Irritable bowel syndrome with diarrhea  Controlled with only using Lomotil prn       Acquired hypothyroidism  Stable on  levothyro       Multiple medical problems  All issues discussed as listed in visit       Uses walker  Uses walker about the house and wheelchair when out of the house due to use of electric wheelchair/       Generalized weakness  This is disuse weakness       Ambulatory dysfunction  Will enroll in PT  Orders:  •  Ambulatory referral to Physical Therapy; Future      Assessment & Plan           History of Present Illness    75-year-old female seen with her  [place] today for ongoing medical problems.  She is seen for hyperlipidemia low potassium hypothyroidism GERD osteoporosis hypertension and insomnia on occasion    F/u and eval HTN: Blood pressure controlled at 114/70 taking amlodipine 5 daily and telmisartan 80 mg daily    F/u and eval NIDDM: Not taking diabetic medication as her A1c is 5.7 continues on Crestor 20 mg daily with cholesterol 135 and LDL 68.  Mammogram was  Relevant labs:  Hemoglobin A1C/EST AVG Glucose Lab Results       Component                Value               Date                       HGBA1C                   5.7 (H)             2024                 EAG                      117                 2024            Fasting glucose Lab Results       Component                Value               Date                       GLUF                     93                  2024              F/u and eval HLD: taking Crestor 20 mg OD and great values:    Relevant labs:  Lipid Profile: Lab Results       Component                Value               Date                        CHOLESTEROL              135                 11/22/2023                 HDL                      55                  11/22/2023                 TRIG                     62                  11/22/2023                 LDLCALC                  68                  11/22/2023                 NONHDLC                  80                  11/22/2023              F/u and eval deconditioning: trying to maintain activities.  Yes    F/u weights: Weight holding at 126 past several  Previous weights:  Wt Readings from Last 3 Encounters:  04/30/25 : 57.2 kg (126 lb)  04/02/25 : 57.2 kg (126 lb)  01/29/25 : 57.2 kg (126 lb)      F/u and eval polypharmacy: All medications reviewed and            Review of Systems   Constitutional:  Negative for activity change, appetite change, chills, diaphoresis, fatigue and fever.   HENT:  Negative for congestion, ear discharge, ear pain, facial swelling, nosebleeds, sore throat, tinnitus, trouble swallowing and voice change.    Eyes:  Negative for photophobia, pain, discharge, redness, itching and visual disturbance.   Respiratory:  Negative for apnea, cough, choking, chest tightness and shortness of breath.    Cardiovascular:  Negative for chest pain, palpitations and leg swelling.   Gastrointestinal:  Negative for abdominal distention, abdominal pain, blood in stool, constipation, diarrhea, nausea and vomiting.   Endocrine: Negative for cold intolerance, heat intolerance, polydipsia, polyphagia and polyuria.   Genitourinary:  Negative for decreased urine volume, difficulty urinating, dysuria, enuresis, frequency, hematuria, pelvic pain, urgency and vaginal bleeding.   Musculoskeletal:  Negative for arthralgias, back pain, gait problem, joint swelling, neck pain and neck stiffness.   Skin:  Negative for color change, pallor and rash.   Allergic/Immunologic: Negative for immunocompromised state.   Neurological:  Negative for dizziness, seizures, facial asymmetry, light-headedness, numbness and  headaches.   Hematological:  Negative for adenopathy.   Psychiatric/Behavioral:  Negative for agitation, behavioral problems, confusion, decreased concentration, dysphoric mood and hallucinations.        Objective   /70 (BP Location: Left arm, Patient Position: Sitting, Cuff Size: Standard)   Pulse 82   Temp 98.2 °F (36.8 °C) (Temporal)   Wt 57.2 kg (126 lb)   SpO2 97%   BMI 27.27 kg/m²      Physical Exam  Vitals and nursing note reviewed.   Constitutional:       General: She is not in acute distress.     Appearance: She is well-developed.   HENT:      Head: Normocephalic and atraumatic.   Eyes:      Conjunctiva/sclera: Conjunctivae normal.   Cardiovascular:      Rate and Rhythm: Normal rate and regular rhythm.      Heart sounds: No murmur heard.  Pulmonary:      Effort: Pulmonary effort is normal. No respiratory distress.      Breath sounds: Normal breath sounds.   Abdominal:      Palpations: Abdomen is soft.      Tenderness: There is no abdominal tenderness.   Musculoskeletal:         General: No swelling.      Cervical back: Neck supple.      Right lower leg: No edema.      Left lower leg: No edema.   Skin:     General: Skin is warm and dry.      Capillary Refill: Capillary refill takes less than 2 seconds.   Neurological:      General: No focal deficit present.      Mental Status: She is alert and oriented to person, place, and time. Mental status is at baseline.   Psychiatric:         Mood and Affect: Mood normal.         Behavior: Behavior normal.         Thought Content: Thought content normal.         Judgment: Judgment normal.       Administrative Statements   I have spent a total time of 30 minutes in caring for this patient on the day of the visit/encounter including Diagnostic results, Prognosis, Risks and benefits of tx options, Instructions for management, Patient and family education, Importance of tx compliance, Risk factor reductions, Impressions, Counseling / Coordination of care,  Documenting in the medical record, Reviewing/placing orders in the medical record (including tests, medications, and/or procedures), and Obtaining or reviewing history  .

## 2025-05-03 DIAGNOSIS — E03.9 HYPOTHYROIDISM, UNSPECIFIED TYPE: ICD-10-CM

## 2025-05-03 DIAGNOSIS — E78.2 MIXED HYPERLIPIDEMIA: ICD-10-CM

## 2025-05-05 DIAGNOSIS — E78.2 MIXED HYPERLIPIDEMIA: ICD-10-CM

## 2025-05-05 DIAGNOSIS — E03.9 HYPOTHYROIDISM, UNSPECIFIED TYPE: ICD-10-CM

## 2025-05-05 RX ORDER — ROSUVASTATIN CALCIUM 20 MG/1
20 TABLET, COATED ORAL DAILY
Qty: 30 TABLET | Refills: 0 | Status: SHIPPED | OUTPATIENT
Start: 2025-05-05 | End: 2025-05-06

## 2025-05-05 RX ORDER — LEVOTHYROXINE SODIUM 50 UG/1
50 TABLET ORAL DAILY
Qty: 30 TABLET | Refills: 0 | Status: SHIPPED | OUTPATIENT
Start: 2025-05-05 | End: 2025-05-06

## 2025-05-06 RX ORDER — ROSUVASTATIN CALCIUM 20 MG/1
20 TABLET, COATED ORAL DAILY
Qty: 100 TABLET | Refills: 3 | Status: SHIPPED | OUTPATIENT
Start: 2025-05-06

## 2025-05-06 RX ORDER — LEVOTHYROXINE SODIUM 50 UG/1
50 TABLET ORAL DAILY
Qty: 100 TABLET | Refills: 3 | Status: SHIPPED | OUTPATIENT
Start: 2025-05-06

## 2025-06-06 DIAGNOSIS — I10 ESSENTIAL HYPERTENSION: ICD-10-CM

## 2025-06-06 RX ORDER — AMLODIPINE BESYLATE 5 MG/1
5 TABLET ORAL DAILY
Qty: 90 TABLET | Refills: 1 | Status: SHIPPED | OUTPATIENT
Start: 2025-06-06

## 2025-06-09 ENCOUNTER — APPOINTMENT (OUTPATIENT)
Dept: LAB | Facility: CLINIC | Age: 75
End: 2025-06-09
Payer: MEDICARE

## 2025-06-09 DIAGNOSIS — D47.2 MGUS (MONOCLONAL GAMMOPATHY OF UNKNOWN SIGNIFICANCE): ICD-10-CM

## 2025-06-09 DIAGNOSIS — D50.9 IRON DEFICIENCY ANEMIA, UNSPECIFIED IRON DEFICIENCY ANEMIA TYPE: ICD-10-CM

## 2025-06-09 LAB
ALBUMIN SERPL BCG-MCNC: 4 G/DL (ref 3.5–5)
ALP SERPL-CCNC: 74 U/L (ref 34–104)
ALT SERPL W P-5'-P-CCNC: 8 U/L (ref 7–52)
ANION GAP SERPL CALCULATED.3IONS-SCNC: 9 MMOL/L (ref 4–13)
AST SERPL W P-5'-P-CCNC: 15 U/L (ref 13–39)
BASOPHILS # BLD AUTO: 0.05 THOUSANDS/ÂΜL (ref 0–0.1)
BASOPHILS NFR BLD AUTO: 1 % (ref 0–1)
BILIRUB SERPL-MCNC: 0.6 MG/DL (ref 0.2–1)
BUN SERPL-MCNC: 17 MG/DL (ref 5–25)
CALCIUM SERPL-MCNC: 9.8 MG/DL (ref 8.4–10.2)
CHLORIDE SERPL-SCNC: 105 MMOL/L (ref 96–108)
CO2 SERPL-SCNC: 25 MMOL/L (ref 21–32)
CREAT SERPL-MCNC: 0.61 MG/DL (ref 0.6–1.3)
EOSINOPHIL # BLD AUTO: 0.48 THOUSAND/ÂΜL (ref 0–0.61)
EOSINOPHIL NFR BLD AUTO: 11 % (ref 0–6)
ERYTHROCYTE [DISTWIDTH] IN BLOOD BY AUTOMATED COUNT: 14.2 % (ref 11.6–15.1)
FERRITIN SERPL-MCNC: 25 NG/ML (ref 30–307)
GFR SERPL CREATININE-BSD FRML MDRD: 90 ML/MIN/1.73SQ M
GLUCOSE P FAST SERPL-MCNC: 71 MG/DL (ref 65–99)
HCT VFR BLD AUTO: 42.4 % (ref 34.8–46.1)
HGB BLD-MCNC: 12.9 G/DL (ref 11.5–15.4)
IGA SERPL-MCNC: 118 MG/DL (ref 66–433)
IGG SERPL-MCNC: 1789 MG/DL (ref 635–1741)
IGM SERPL-MCNC: 68 MG/DL (ref 45–281)
IMM GRANULOCYTES # BLD AUTO: 0.02 THOUSAND/UL (ref 0–0.2)
IMM GRANULOCYTES NFR BLD AUTO: 0 % (ref 0–2)
LYMPHOCYTES # BLD AUTO: 0.81 THOUSANDS/ÂΜL (ref 0.6–4.47)
LYMPHOCYTES NFR BLD AUTO: 18 % (ref 14–44)
MCH RBC QN AUTO: 29.5 PG (ref 26.8–34.3)
MCHC RBC AUTO-ENTMCNC: 30.4 G/DL (ref 31.4–37.4)
MCV RBC AUTO: 97 FL (ref 82–98)
MONOCYTES # BLD AUTO: 0.38 THOUSAND/ÂΜL (ref 0.17–1.22)
MONOCYTES NFR BLD AUTO: 9 % (ref 4–12)
NEUTROPHILS # BLD AUTO: 2.71 THOUSANDS/ÂΜL (ref 1.85–7.62)
NEUTS SEG NFR BLD AUTO: 61 % (ref 43–75)
NRBC BLD AUTO-RTO: 0 /100 WBCS
PLATELET # BLD AUTO: 397 THOUSANDS/UL (ref 149–390)
PMV BLD AUTO: 9 FL (ref 8.9–12.7)
POTASSIUM SERPL-SCNC: 4.3 MMOL/L (ref 3.5–5.3)
PROT SERPL-MCNC: 8.3 G/DL (ref 6.4–8.4)
RBC # BLD AUTO: 4.38 MILLION/UL (ref 3.81–5.12)
SODIUM SERPL-SCNC: 139 MMOL/L (ref 135–147)
WBC # BLD AUTO: 4.45 THOUSAND/UL (ref 4.31–10.16)

## 2025-06-09 PROCEDURE — 82728 ASSAY OF FERRITIN: CPT

## 2025-06-09 PROCEDURE — 80053 COMPREHEN METABOLIC PANEL: CPT

## 2025-06-09 PROCEDURE — 84165 PROTEIN E-PHORESIS SERUM: CPT

## 2025-06-09 PROCEDURE — 83521 IG LIGHT CHAINS FREE EACH: CPT

## 2025-06-09 PROCEDURE — 85025 COMPLETE CBC W/AUTO DIFF WBC: CPT

## 2025-06-09 PROCEDURE — 36415 COLL VENOUS BLD VENIPUNCTURE: CPT

## 2025-06-09 PROCEDURE — 82784 ASSAY IGA/IGD/IGG/IGM EACH: CPT

## 2025-06-10 LAB
ALBUMIN SERPL ELPH-MCNC: 3.61 G/DL (ref 3.2–5.1)
ALBUMIN SERPL ELPH-MCNC: 46.3 % (ref 48–70)
ALPHA1 GLOB SERPL ELPH-MCNC: 0.4 G/DL (ref 0.15–0.47)
ALPHA1 GLOB SERPL ELPH-MCNC: 5.1 % (ref 1.8–7)
ALPHA2 GLOB SERPL ELPH-MCNC: 1.03 G/DL (ref 0.42–1.04)
ALPHA2 GLOB SERPL ELPH-MCNC: 13.2 % (ref 5.9–14.9)
BETA GLOB ABNORMAL SERPL ELPH-MCNC: 0.5 G/DL (ref 0.31–0.57)
BETA1 GLOB SERPL ELPH-MCNC: 6.4 % (ref 4.7–7.7)
BETA2 GLOB SERPL ELPH-MCNC: 8.6 % (ref 3.1–7.9)
BETA2+GAMMA GLOB SERPL ELPH-MCNC: 0.67 G/DL (ref 0.2–0.58)
GAMMA GLOB ABNORMAL SERPL ELPH-MCNC: 1.59 G/DL (ref 0.4–1.66)
GAMMA GLOB SERPL ELPH-MCNC: 20.4 % (ref 6.9–22.3)
IGG/ALB SER: 0.86 {RATIO} (ref 1.1–1.8)
KAPPA LC FREE SER-MCNC: 41.1 MG/L (ref 3.3–19.4)
KAPPA LC FREE/LAMBDA FREE SER: 1.74 {RATIO} (ref 0.26–1.65)
LAMBDA LC FREE SERPL-MCNC: 23.6 MG/L (ref 5.7–26.3)
M PROTEIN 1 SERPL ELPH-MCNC: 0.8 G/DL
PROT SERPL-MCNC: 7.8 G/DL (ref 6.4–8.4)

## 2025-06-10 PROCEDURE — 84165 PROTEIN E-PHORESIS SERUM: CPT | Performed by: PATHOLOGY

## 2025-06-16 ENCOUNTER — OFFICE VISIT (OUTPATIENT)
Dept: HEMATOLOGY ONCOLOGY | Facility: CLINIC | Age: 75
End: 2025-06-16
Payer: MEDICARE

## 2025-06-16 VITALS
DIASTOLIC BLOOD PRESSURE: 80 MMHG | RESPIRATION RATE: 18 BRPM | TEMPERATURE: 98 F | HEART RATE: 67 BPM | SYSTOLIC BLOOD PRESSURE: 128 MMHG | BODY MASS INDEX: 27.27 KG/M2 | OXYGEN SATURATION: 100 % | HEIGHT: 57 IN

## 2025-06-16 DIAGNOSIS — R79.0 LOW IRON STORES: ICD-10-CM

## 2025-06-16 DIAGNOSIS — I10 PRIMARY HYPERTENSION: ICD-10-CM

## 2025-06-16 DIAGNOSIS — M19.90 ARTHRITIS: ICD-10-CM

## 2025-06-16 DIAGNOSIS — R29.898 WEAKNESS OF BOTH LEGS: ICD-10-CM

## 2025-06-16 DIAGNOSIS — E03.9 ACQUIRED HYPOTHYROIDISM: ICD-10-CM

## 2025-06-16 DIAGNOSIS — D47.2 MGUS (MONOCLONAL GAMMOPATHY OF UNKNOWN SIGNIFICANCE): Primary | ICD-10-CM

## 2025-06-16 PROCEDURE — G2211 COMPLEX E/M VISIT ADD ON: HCPCS | Performed by: INTERNAL MEDICINE

## 2025-06-16 PROCEDURE — 99214 OFFICE O/P EST MOD 30 MIN: CPT | Performed by: INTERNAL MEDICINE

## 2025-06-16 NOTE — PATIENT INSTRUCTIONS
Blood work prior to next visit in 4 months.  She will continue taking 1 iron tablet 3 days a week as before.

## 2025-06-16 NOTE — ASSESSMENT & PLAN NOTE
MGUS parameters are being monitored.  No significant hematological change.  Orders:    CBC and differential; Future    Comprehensive metabolic panel; Future    IgG, IgA, IgM; Future    Immunoglobulin free LT chains blood; Future    Protein electrophoresis, serum; Future

## 2025-06-16 NOTE — PROGRESS NOTES
Name: Olivia Reid      : 1950      MRN: [id number]  Encounter Provider: Carlos Manuel Elizalde MD  Encounter Date: 2025   Encounter department: [facility] [place]  :  Assessment & Plan  MGUS (monoclonal gammopathy of unknown significance)  MGUS parameters are being monitored.  No significant hematological change.  Orders:    CBC and differential; Future    Comprehensive metabolic panel; Future    IgG, IgA, IgM; Future    Immunoglobulin free LT chains blood; Future    Protein electrophoresis, serum; Future    Low iron stores  Ferritin is 25.  She was referred for GI evaluation and had EGD and colonoscopy but colonoscopy prep was poor.  She does not want to have that done again.  She has been taking 1 iron tablet 3 days a week.  Orders:    Ferritin; Future    Acquired hypothyroidism  Being managed by PCP.       Primary hypertension  Being managed by PCP.       Arthritis  Has arthritic joint deformities       Weakness of both legs  Right leg has been weak for few years and she has right foot drop.  Left leg has been weak for last 2 years.  Problem could be in her lower back.  She had been to neurologist and spine surgeons but did not have surgery.  She is in a motor scooter.  She states PCP has ordered physical therapy for her.         Blood work prior to next visit in 4 months.  She will continue taking 1 iron tablet 3 days a week as before.  See diagnoses, orders and instructions above.  Goal is to monitor hematological parameters for MGUS.  Patient needs assistance in her care.  All discussed in detail.  Questions answered.  I suggested self breast examination, eating healthy foods.  Patient to avoid falls and trauma.  Suggested health screening tests. Patient to continue to follow-up with primary physician and other consultants.  Provided counseling and support.  I used a dictation device to dictate this note and there could be mistakes in my note and for that patient  "may contact my office.      History of Present Illness   Chief Complaint   Patient presents with    Follow-up   Patient is here with her .  Patient is in motor scooter.  She has IgG kappa MGUS and has a small spike.  Hematological parameters for MGUS are being monitored.  There has not been any significant change.  Her main problem is arthritis and weakness in the legs and right foot drop.  Low ferritin.  Patient is not anemic.  She was referred for GI evaluation that she could not complete.  History of colon polyp.  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  She has been taking 1 iron tablet 3 days a week.  History of osteoporosis and she has been on vitamin D and calcium and Prolia.  She follows with a rheumatologist.  Has tiredness.  Pertinent Medical History   See details in HPI.  06/16/25:      Review of Systems  Reviewed 12 systems.  Symptoms are in HPI.  No other neurological, cardiac, pulmonary, GI or  symptoms other than listed in HPI.  No other symptoms other than listed in HPI.      Objective   /80 (BP Location: Left arm, Patient Position: Sitting, Cuff Size: Large)   Pulse 67   Temp 98 °F (36.7 °C) (Temporal)   Resp 18   Ht 4' 9\" (1.448 m)   SpO2 100%   BMI 27.27 kg/m²     Physical Exam  Vitals reviewed.   Constitutional:       General: She is not in acute distress.     Comments: Patient has a motor scooter.   HENT:      Head: Normocephalic and atraumatic.      Mouth/Throat:      Mouth: Mucous membranes are moist.      Comments: No thrush.    Eyes:      General: No scleral icterus.        Right eye: No discharge.         Left eye: No discharge.      Conjunctiva/sclera: Conjunctivae normal.       Cardiovascular:      Rate and Rhythm: Normal rate and regular rhythm.      Heart sounds: Normal heart sounds. No murmur heard.  Pulmonary:      Effort: Pulmonary effort is normal. No respiratory distress.      Breath sounds: Normal breath sounds. No rhonchi or rales. "   Abdominal:      Palpations: Abdomen is soft.      Tenderness: There is no abdominal tenderness.      Comments: Incomplete abdominal examination on motor scooter     Musculoskeletal:         General: Deformity (Has joint deformities visible in her hands) present. No swelling.      Cervical back: Normal range of motion. No rigidity or tenderness.      Right lower leg: No edema.      Left lower leg: No edema.      Comments: No calf tenderness.   Lymphadenopathy:      Cervical: No cervical adenopathy.      Upper Body:      Right upper body: No supraclavicular or axillary adenopathy.      Left upper body: No supraclavicular or axillary adenopathy.     Skin:     General: Skin is warm.      Coloration: Skin is not jaundiced or pale.      Findings: No bruising or rash.      Nails: There is no clubbing.     Neurological:      General: No focal deficit present.      Mental Status: She is alert and oriented to person, place, and time.      Motor: Weakness (Weak legs and right foot drop) present.      Coordination: Coordination normal.      Gait: Gait abnormal (Motor scooter).     Psychiatric:         Behavior: Behavior normal.         Thought Content: Thought content normal.      Comments: Anxious     ECOG 3    Labs: I have reviewed the following labs:  Results  Path Interpretation, Serum Protein Electrophoresis (Order [id number])     Path Interpretation, Serum Protein Electrophoresis (Order [id number]) - Reflex for Order [id number]  Result Information    Status Priority Source   Final result (6/10/2025  2:13 PM) Routine       Related Results     Immunofixation, Serum(Reflex Only-Do Not Order) Final result 12/5/2024                Protein electrophoresis, serum Final result 12/5/2024                       Other Results from 6/9/2025     CBC and differential Final result 6/9/2025    Comprehensive metabolic panel Final result 6/9/2025    IgG, IgA, IgM Final result 6/9/2025    Immunoglobulin free LT chains blood Final result  6/9/2025    Ferritin Final result 6/9/2025       Protein electrophoresis, serum (Order [id number])    Result Information    Abnormality Status Priority Source   Abnormal Final result (6/10/2025  2:13 PM) Routine       Other Results from 6/9/2025     CBC and differential Final result 6/9/2025    Comprehensive metabolic panel Final result 6/9/2025    IgG, IgA, IgM Final result 6/9/2025    Immunoglobulin free LT chains blood Final result 6/9/2025    Ferritin Final result 6/9/2025     Path Interpretation, Serum Protein Electrophoresis: [id number]  Order: [id number] - Reflex for Order [id number]   Collected 6/9/2025 10:47 AM       Status: Final result       Dx: MGUS (monoclonal gammopathy of unknow...    Test Result Released: Yes (not seen)    0 Result Notes      Component  Ref Range & Units (hover)    Case Report   Electrophoresis Case                              Case: [id number]                                   Authorizing Provider:  Carlos Manuel Elizalde MD        Collected:           06/09/2025 1047               Ordering Location:     [facility]      Received:            06/09/2025 [address] [place] [address]                                                   Pathologist:           Karly Chery MD                                                                   Specimen:                                                                                              SPEP Interpretation   The SPEP shows a monoclonal peak in the gamma region. Previous immunofixation on 12/06/2024 identified the monoclonal peak as IgG kappa.    Electronically signed by Karly Chery MD on 6/10/2025 at 1413 EDT   Resulting Agency [facility]             Specimen Collected: 06/09/25 10:47 AM Last Resulted: 06/10/25  2:13 PM       Order Details        View Encounter        Lab and Collection Details        Routing        Result History     View All Conversations on this Encounter     Scans on Order  [id number]    Lab Result Document - Document on 6/10/2025  2:13 PM         Result Care Coordination      Patient Communication     Add Comments   Add Notifications  Back to Top      Contains abnormal data Protein electrophoresis, serum  Order: [id number]   Collected 6/9/2025 10:47 AM       Status: Final result       Dx: MGUS (monoclonal gammopathy of unknow...    Test Result Released: Yes (not seen)    0 Result Notes             Component  Ref Range & Units (hover) 7 d ago  (6/9/25) 7 d ago  (6/9/25) 6 mo ago  (12/5/24) 6 mo ago  (12/5/24) 10 mo ago  (8/1/24) 11 mo ago  (7/3/24) 1 yr ago  (6/12/24) 1 yr ago  (6/12/24)   A/G Ratio 0.86 Low   0.82 Low     0.91 Low     Albumin % 46.3 Low   45.1 Low     47.7 Low     Albumin 3.61  3.92    3.48    Alpha-1 Globulin % 5.1  5.3    4.8    Alpha-1 Globulin 0.40  0.46    0.35    Alpha-2 Globulin % 13.2  12.0    11.9    Alpha-2 Globulin 1.03  1.04    0.87    Beta-1 Globulin % 6.4  6.2    5.9    Beta-1 Globulin 0.50  0.54    0.43    Beta-2 Globulin % 8.6 High   9.0 High     7.8    Beta-2 Globulin 0.67 High   0.78 High     0.57    Gamma Globulin % 20.4  22.4 High     21.9    Gamma Globulin 1.59  1.95 High     1.60    M-Jose 0.80  0.41    0.31    Total Protein 7.8 8.3 8.7 High  R 8.9 High  8.0 7.4 7.3 R 7.7   [facility]ing Agency [facility]         Result Notes            Component  Ref Range & Units (hover) 6/9/25 10:47 AM 6/12/24  9:10 AM 3/11/24 10:02 AM 3/19/21 10:28 AM 9/23/20 11:17 AM 2/4/20 10:05 AM 11/11/19 12:19 PM   Ferritin 25 Low  31 R 40 R 92 R 80 R 50 R 68 R                  Specimen Collected: 06/09/25 10:47 AM Last Resulted: 06/10/25  2:13 PM                 Component  Ref Range & Units (hover) 6/9/25 10:47 AM 12/5/24 12:06 PM 6/12/24  9:10 AM 12/19/23  9:40 AM 11/10/22 11:28 AM   Ig [place] Free Light Chain 41.1 High  54.0 High  46.6 High  44.9 High  38.4 High    Ig Lambda Free Light Chain 23.6 25.4 23.2 20.3 23.7    Kappa/Lambda FluidC Ratio 1.74 High  2.13 High  2.01 High  2.21 High  1.62                      Component  Ref Range & Units (hover) 6/9/25 10:47 AM 12/5/24 12:06 PM 6/12/24  9:10 AM 12/19/23  9:40 AM    100 84 92   IGG 1,789 High  1,944 High  1,870 High  1,731   IGM 68 62 40 Low  55             Comprehensive metabolic panel  Status: Final result     Comprehensive metabolic panel  Order: [id number]   Status: Final result       Next appt: 07/31/2025 at 12:30 PM in [facility] (GLORIA Ga, DO)       Dx: MGUS (monoclonal gammopathy of unknow...    Test Result Released: Yes (not seen)    0 Result Notes   important suggestion  Newer results are available. Click to view them now.               Component  Ref Range & Units (hover) 6/9/25 10:47 AM 12/5/24 12:06 PM 12/5/24 12:06 PM 8/1/24 12:07 PM 7/3/24 10:16 AM 6/12/24  9:10 AM 6/12/24  9:10 AM   Sodium 139  136 139 138  139   Potassium 4.3  3.7 4.2 4.2  4.1   Chloride 105  101 106 104  106   CO2 25  23 26 24  23   ANION GAP 9  12 7 10  10   BUN 17  21 19 18  22   Creatinine 0.61  0.80 CM 0.71 CM 0.65 CM  0.65 CM   Comment: Standardized to IDMS reference method   Glucose, Fasting 71  93 88 82  90   Calcium 9.8  9.9 9.8 9.7  9.4   AST 15  16 17 17  17   ALT 8  10 CM 10 CM 11 CM  9 CM   Comment: Specimen collection should occur prior to Sulfasalazine administration due to the potential for falsely depressed results.   Alkaline Phosphatase 74  78 71 66  66   Total Protein 8.3 8.7 High  R 8.9 High  8.0 7.4 7.3 R 7.7   Albumin 4.0  4.2 3.6 3.7  3.8   Total Bilirubin 0.60  0.50 CM 0.46 CM 0.50 CM  0.54 CM   Comment: Use of this assay is not recommended for patients undergoing treatment with eltrombopag due to the potential for falsely elevated results.  N-acetyl-p-benzoquinone imine (metabolite of Acetaminophen) will generate erroneously low results in samples for patients that have taken an overdose of Acetaminophen.   eGFR 90  73 85 88  88        CBC and  differential  Status: Final result      Contains abnormal data CBC and differential  Order: [id number]   Status: Final result       Next appt: 07/31/2025 at 12:30 PM in Family Medicine (GLORIA Ga DO)       Dx: MGUS (monoclonal gammopathy of unknow...    Test Result Released: Yes (not seen)    0 Result Notes            Component  Ref Range & Units (hover) 6/9/25 10:47 AM 12/5/24 12:06 PM 6/12/24  9:10 AM 3/11/24 10:02 AM 1/30/24 10:25 AM 11/22/23 10:05 AM 12/1/22 12:14 PM   WBC 4.45 7.66 5.43 6.92 4.85 5.97 4.00 Low    RBC 4.38 4.60 4.13 4.13 4.39 3.97 4.70   Hemoglobin 12.9 13.2 12.5 12.3 12.9 11.7 13.7   Hematocrit 42.4 42.7 39.5 38.8 41.7 38.2 43.8   MCV 97 93 96 94 95 96 93   MCH 29.5 28.7 30.3 29.8 29.4 29.5 29.1   MCHC 30.4 Low  30.9 Low  31.6 31.7 30.9 Low  30.6 Low  31.3 Low    RDW 14.2 13.9 13.8 14.5 13.8 14.0 13.6   MPV 9.0 9.0 8.9 8.9 9.1 9.0 8.8 Low    Platelets 397 High  517 High  373 386 382 394 High  344   nRBC 0 0 0 0 0  0   Segmented % 61 72 55 66 62  49   Immature Grans % 0 0 0 0 0  0   Lymphocytes % 18 15 16 12 Low  16  29   Monocytes % 9 8 9 7 8  13 High    Eosinophils Relative 11 High  4 19 High  14 High  13 High   7 High    Basophils Relative 1 1 1 1 1  2 High    Absolute Neutrophils 2.71 5.51 3.00 4.56 3.03  1.98   Absolute Immature Grans 0.02 0.03 0.02 0.02 0.01  0.01   Absolute Lymphocytes 0.81 1.13 0.87 0.84 0.75  1.14   Absolute Monocytes 0.38 0.60 0.47 0.51 0.38  0.53   Eosinophils Absolute 0.48 0.33 1.03 High  0.95 High  0.63 High   0.28   Basophils Absolute 0.05 0.06 0.04 0.04 0.05  0.06               Specimen Collected: 06/09/25 10:47 AM        Mammo screening bilateral w 3d and cad  Status: Final result     PACS Images - GE     Show images for Mammo screening bilateral w 3d and cad  PACS Images - Sectra     Show images for Mammo screening bilateral w 3d and cad  Study Result    Narrative & Impression   DIAGNOSIS: Encounter for screening mammogram for breast cancer       TECHNIQUE:  Digital screening mammography was performed. Computer Aided Detection (CAD) analyzed all applicable images.   COMPARISONS: Prior breast imaging dated: 12/19/2023, 05/17/2021, 02/27/2017, and 02/25/2016     RELEVANT HISTORY:   Family Breast Cancer History: History of breast cancer in Maternal Grandmother.  Family Medical History: Family medical history includes breast cancer in maternal grandmother and colon cancer in father.   Personal History: Hormone history includes estrogen replacement therapy. Surgical history includes breast excisional biopsy and hysterectomy. No known relevant medical history.     The patient is scheduled in a reminder system for screening mammography.     8-10% of cancers will be missed on mammography. Management of a palpable abnormality must be based on clinical grounds.  Patients will be notified of their results via letter from our facility. Accredited by [employer] and FDA.     RISK ASSESSMENT:   5 Year Tyrer-Cuzick: 0.71%  10 Year Tyrer-Cuzick: 1.52%  Lifetime Tyrer-Cuzick: 1.86%     TISSUE DENSITY:   There are scattered areas of fibroglandular density.      INDICATION: Olivia Reid is a 75 y.o. female presenting for screening mammography.     FINDINGS:   Bilateral  There are no suspicious masses, grouped microcalcifications or areas of unexplained architectural distortion. The skin and nipple areolar complex are unremarkable.          IMPRESSION:  No mammographic evidence of malignancy.           ASSESSMENT/BI-RADS CATEGORY:  Left: 2 - Benign  Right: 2 - Benign  Overall: 2 - Benign     RECOMMENDATION:       - Routine screening mammogram in 1 year for both breasts.     Workstation ID: [id number]        Signed by:  Madhuri Linares MD                Imaging    Mammo screening bilateral w 3d and cad (Order: [id number]) - 4/2/2025        Results  Tissue Exam (Order [id number])  Result Information    Status Priority Source   Final result (1/13/2025  2:03  PM) Routine Stomach        Tissue Exam: Result Notes     Roxy Young MA  2/7/2025  9:30 AM EST         Patient returned my call. Was given egd results an 1 year recall information. Thank you.    Roxy Young MA  2/7/2025  8:41 AM EST         Left a non-urgent message to return call to office. No consent form in chart. Egd 1 year recall entered into chart. Thank you.    Anant Vallecillo MD  2/6/2025  8:49 AM EST         Please call the patient regarding results.  Biopsies all benign.  No evidence of H. pylori.  Stomach polyps were hyperplastic.  These are benign but do have a very small risk of progressing to cancer.  Would recommend repeat EGD in 1 year for reevaluation.            Tissue Exam: [id number]  Order: [id number]   Collected 1/9/2025  9:54 AM       Status: Final result       Dx: Hx of colonic polyps; Family hx of co...    Test Result Released: Yes (not seen)    3 Result Notes       View Follow-Up Encounter      Component  Ref Range & Units (hover)    Case Report   Surgical Pathology Report                         Case: [id number]                                   Authorizing Provider:  Anant Vallecillo MD         Collected:           01/09/2025 0954               Ordering Location:     [facility]   Received:            01/09/2025 1508                                      Endoscopy                                                                     Pathologist:           Ricco Webber MD                                                             Specimens:   A) - Stomach, cold bx, r/o h. pylori                                                                B) - Stomach, cold snare, polyp x3                                                        Final Diagnosis   A. Stomach, cold bx, r/o h. pylori:  Mild chronic inactive gastritis and reactive gastropathy  No H. pylori identified on H&E stained slide       B. Stomach, cold snare, polyp x3:  Hyperplastic polyps       Electronically  signed by Ricco Webber MD on 1/13/2025 at 1403 EST   Note             EGD  Order# [id number]  Reading physician: Anant Vallecillo MD Ordering provider: TAMICA Mejia Study date: 1/9/25     Result Information    Status: Final result (Exam End: 1/9/2025 10:04 AM) Provider Status: Reviewed     PACS Images     Show images for EGD  PACS Images - Sectra     Show images for EGD  Related Results     Colonoscopy Final result 1/9/2025 10:04 AM                Table formatting from the original result was not included.  [facility]  [address] [address]  [phone]      DATE OF SERVICE:  1/09/25   ...        Solid stool throughout the sigmoid colon severely compromised  visualization and precluded further advancement of the scope        RECOMMENDATION:  Repeat colonoscopy in 6 months, due: 7/8/2025  Inadequate bowel preparation     Repeat colonoscopy with extended prep ...           External Procedure Report    Open External Result Report     Current Recommendation    Await pathology results     Result Text       [facility]  [address] [address]  [phone]        DATE OF SERVICE:  1/09/25     PHYSICIAN(S):  Attending:   Anant Vallecillo MD      Fellow:   No Staff Documented         INDICATION:  Gastroesophageal reflux disease, unspecified whether esophagitis present     POST-OP DIAGNOSIS:  See the impression below.     PREPROCEDURE:  Informed consent was obtained for the procedure, including sedation.  Risks of perforation, hemorrhage, adverse drug reaction and aspiration were discussed. The patient was placed in the left lateral decubitus position.     Patient was explained about the risks and benefits of the procedure. Risks including but not limited to bleeding, infection, and perforation were explained in detail. Also explained about less than 100% sensitivity with the exam and other alternatives.     PROCEDURE: EGD      DETAILS OF PROCEDURE:   Patient was taken to the procedure room where a time out was performed to confirm correct patient and correct procedure. The patient underwent monitored anesthesia care, which was administered by an anesthesia professional. The patient's blood pressure, heart rate, level of consciousness, respirations, oxygen, ECG and ETCO2 were monitored throughout the procedure. The scope was introduced through the mouth and advanced to the third part of the duodenum. Retroflexion was performed in the fundus. The patient's estimated blood loss was minimal (<5 mL). The procedure was not difficult. The patient tolerated the procedure well. There were no apparent adverse events.      ANESTHESIA INFORMATION:  ASA: II  Anesthesia Type: IV Sedation with Anesthesia     MEDICATIONS:  No administrations occurring from 0945 to 0959 on 01/09/25         FINDINGS:  The esophagus appeared normal. Z-line is 34 cm from the incisors.  2 cm hiatal hernia:  Hill classification: Grade II  Three polyps measuring smaller than 5 mm in the body of the stomach; performed cold snare removal  The duodenum appeared normal.  Performed forceps biopsies in the body of the stomach, greater curve of the stomach, lesser curve of the stomach and antrum to rule out H. pylori        SPECIMENS:  ID Type Source Tests Collected by Time Destination   1 : cold bx, r/o h. pylori Tissue Stomach TISSUE EXAM Anant Vallecillo MD 1/9/2025 0914     2 : cold snare, polyp x3 Tissue Stomach TISSUE EXAM Anant Vallecillo MD 1/9/2025 0985              IMPRESSION:  The esophagus appeared normal.  2 cm hiatal hernia  3 polyps in the body of the stomach; performed cold snare  The duodenum appeared normal.  Performed forceps biopsies in the body of the stomach, greater curve of the stomach, lesser curve of the stomach and antrum to rule out H. pylori        RECOMMENDATION:  Await pathology results                  Anant Vallecillo MD            Result History    EGD  (Order #[id number]) on 1/9/2025 - Order Result History Report    Related Specimens    Colonoscopy  Order# [id number]  Reading physician: Anant Vallecillo MD Ordering provider: TAMICA Mejia Study date: 1/9/25     Result Information    Status: Final result (Exam End: 1/9/2025 10:04 AM) Provider Status: Reviewed     PACS Images     Show images for Colonoscopy  PACS Images - Sectra     Show images for Colonoscopy  Related Results     EGD Final result 1/9/2025 10:04 AM                Table formatting from the original result was not included.  [facility]  [address] [address]  [phone]      DATE OF SERVICE:  1/09/25   ...        The esophagus appeared normal.  2 cm hiatal hernia  3 polyps in the body of the stomach; performed cold snare  The duodenum appeared normal.  Performed forceps biopsies in the body of the stomach, greater curve of  the stomach, lesser curve of the stomach and antrum to rule out H. pylori      RECOMMENDATION:  Await pathology results  ...           External Procedure Report    Open External Result Report     Current Recommendation    Repeat colonoscopy in 6 months, due: 7/8/2025   Inadequate bowel preparation     Result Text       [facility]  [address] [address]  [phone]        DATE OF SERVICE:  1/09/25     PHYSICIAN(S):  Attending:   Anant Vallecillo MD      Fellow:   No Staff Documented         INDICATION:  Hx of colonic polyps, Family hx of colon cancer.  Last colonoscopy 2019 with inadequate prep        POST-OP DIAGNOSIS:  See the impression below.     HISTORY:  Prior colonoscopy: 5 years ago.     BOWEL PREPARATION:  Golytely/Colyte/Trilyte        PREPROCEDURE:  Informed consent was obtained for the procedure, including sedation. Risks including but not limited to bleeding, infection, perforation, adverse drug reaction and aspiration were explained in detail. Also explained about less  than 100% sensitivity with the exam and other alternatives. The patient was placed in the left lateral decubitus position.     Procedure: Colonoscopy      DETAILS OF PROCEDURE:   Patient was taken to the procedure room where a time out was performed to confirm correct patient and correct procedure. The patient underwent monitored anesthesia care, which was administered by an anesthesia professional. The patient's blood pressure, ECG, ETCO2, heart rate, level of consciousness, oxygen and respirations were monitored throughout the procedure. A digital rectal exam was performed. The scope was introduced through the anus and advanced to the sigmoid colon. Retroflexion was performed in the rectum. The quality of bowel preparation was evaluated using the [place] Bowel Preparation Scale with scores of: right colon = not assessed, transverse colon = not assessed, left colon = 0. The total BBPS score was 0. Bowel prep was not adequate. The patient experienced no blood loss. The procedure was difficult due to poor preparation. The patient tolerated the procedure well. There were no apparent adverse events.         ANESTHESIA INFORMATION:  ASA: II  Anesthesia Type: IV Sedation with Anesthesia     MEDICATIONS:  No administrations occurring from 0945 to 1005 on 01/09/25            FINDINGS:  Solid stool throughout the sigmoid colon severely compromised visualization and precluded further advancement of the scope        EVENTS:      Procedure Events   Event Event Time   ENDO CECUM REACHED 1/9/2025 10:04 AM   ENDO SCOPE OUT TIME 1/9/2025 10:04 AM         SPECIMENS:  ID Type Source Tests Collected by Time Destination   1 : cold bx, r/o h. pylori Tissue Stomach TISSUE EXAM Anant Vallecillo MD 1/9/2025 0954     2 : cold snare, polyp x3 Tissue Stomach TISSUE EXAM Anant Vallecillo MD 1/9/2025 0957           EQUIPMENT:  Colonoscope - 2881           IMPRESSION:  Solid stool throughout the sigmoid colon severely compromised visualization and  precluded further advancement of the scope           RECOMMENDATION:  Repeat colonoscopy in 6 months, due: 7/8/2025  Inadequate bowel preparation      Repeat colonoscopy with extended prep                  Anant Vallecillo MD               Result History    Colonoscopy (Order #[id number]) on 1/9/2025 - Order Result History Report

## 2025-06-23 ENCOUNTER — TELEPHONE (OUTPATIENT)
Dept: FAMILY MEDICINE CLINIC | Facility: CLINIC | Age: 75
End: 2025-06-23

## 2025-06-23 NOTE — TELEPHONE ENCOUNTER
Patient called the RX Refill Line. Message is being forwarded to the office.     Patient is requesting a new brace. Stated that she has a right lower extremity [employer] brace for foot drop but she's had it for 30 years and would like a new one. Stated that she has an appt on 7/10 at [facility] (their phone number ) and needs the paperwork to take with her to that appt, pt stated she'd come pick it up when it's ready    Please contact patient at

## 2025-06-30 ENCOUNTER — TELEPHONE (OUTPATIENT)
Age: 75
End: 2025-06-30

## 2025-06-30 NOTE — TELEPHONE ENCOUNTER
PT called in because Dr Vallecillo wanted her to have a colonoscopy this July. She does not want to have it done yet. When she is ready to have an EGD she will then schedule both at the same time.

## 2025-07-02 DIAGNOSIS — M62.838 MUSCLE SPASM OF BOTH LOWER LEGS: ICD-10-CM

## 2025-07-02 DIAGNOSIS — E87.6 HYPOPOTASSEMIA: ICD-10-CM

## 2025-07-03 RX ORDER — POTASSIUM CHLORIDE 750 MG/1
CAPSULE, EXTENDED RELEASE ORAL
Qty: 360 CAPSULE | Refills: 1 | Status: SHIPPED | OUTPATIENT
Start: 2025-07-03

## 2025-07-03 NOTE — TELEPHONE ENCOUNTER
Patient called to see if this script was sent for her brace.  Called office clinical and spoke with Clemente.  Clemente gave me the update that the script is ready to to be sent just waiting for Dr. Ga to sign it so that it can be faxed over.  Dr. Ga back in the office on Monday.    Patient made aware and expressed understanding

## 2025-07-04 RX ORDER — BACLOFEN 20 MG/1
20 TABLET ORAL 4 TIMES DAILY
Qty: 120 TABLET | Refills: 6 | Status: SHIPPED | OUTPATIENT
Start: 2025-07-04

## 2025-07-31 ENCOUNTER — OFFICE VISIT (OUTPATIENT)
Dept: FAMILY MEDICINE CLINIC | Facility: CLINIC | Age: 75
End: 2025-07-31
Payer: MEDICARE

## 2025-07-31 VITALS
WEIGHT: 125.5 LBS | BODY MASS INDEX: 27.08 KG/M2 | HEART RATE: 66 BPM | DIASTOLIC BLOOD PRESSURE: 74 MMHG | OXYGEN SATURATION: 93 % | HEIGHT: 57 IN | SYSTOLIC BLOOD PRESSURE: 130 MMHG | TEMPERATURE: 98.2 F

## 2025-07-31 DIAGNOSIS — Z00.00 ENCOUNTER FOR MEDICARE ANNUAL WELLNESS EXAM: ICD-10-CM

## 2025-07-31 DIAGNOSIS — I10 PRIMARY HYPERTENSION: Primary | ICD-10-CM

## 2025-07-31 DIAGNOSIS — L28.0 NEURODERMATITIS, LOCALIZED: ICD-10-CM

## 2025-07-31 DIAGNOSIS — R63.8 INCREASED BMI: ICD-10-CM

## 2025-07-31 DIAGNOSIS — J40 BRONCHITIS: ICD-10-CM

## 2025-07-31 DIAGNOSIS — J20.9 ACUTE BRONCHITIS, UNSPECIFIED ORGANISM: ICD-10-CM

## 2025-07-31 DIAGNOSIS — D47.2 MGUS (MONOCLONAL GAMMOPATHY OF UNKNOWN SIGNIFICANCE): ICD-10-CM

## 2025-07-31 DIAGNOSIS — Z71.2 ENCOUNTER TO DISCUSS TEST RESULTS: ICD-10-CM

## 2025-07-31 DIAGNOSIS — Z79.899 MEDICATION MANAGEMENT: ICD-10-CM

## 2025-07-31 PROCEDURE — G0439 PPPS, SUBSEQ VISIT: HCPCS | Performed by: FAMILY MEDICINE

## 2025-07-31 PROCEDURE — 99214 OFFICE O/P EST MOD 30 MIN: CPT | Performed by: FAMILY MEDICINE

## 2025-07-31 PROCEDURE — G2211 COMPLEX E/M VISIT ADD ON: HCPCS | Performed by: FAMILY MEDICINE

## 2025-07-31 RX ORDER — DEXTROMETHORPHAN HYDROBROMIDE AND PROMETHAZINE HYDROCHLORIDE 15; 6.25 MG/5ML; MG/5ML
5 SYRUP ORAL 4 TIMES DAILY PRN
Qty: 180 ML | Refills: 0 | Status: SHIPPED | OUTPATIENT
Start: 2025-07-31

## 2025-07-31 RX ORDER — TRIAMCINOLONE ACETONIDE 1 MG/G
CREAM TOPICAL 2 TIMES DAILY
Qty: 45 G | Refills: 1 | Status: SHIPPED | OUTPATIENT
Start: 2025-07-31

## 2025-07-31 RX ORDER — AZITHROMYCIN 250 MG/1
TABLET, FILM COATED ORAL
Qty: 6 TABLET | Refills: 0 | Status: SHIPPED | OUTPATIENT
Start: 2025-07-31 | End: 2025-08-05

## 2025-08-03 DIAGNOSIS — I10 ESSENTIAL HYPERTENSION: ICD-10-CM

## 2025-08-05 RX ORDER — TELMISARTAN 80 MG/1
80 TABLET ORAL DAILY
Qty: 90 TABLET | Refills: 1 | OUTPATIENT
Start: 2025-08-05

## 2025-08-05 RX ORDER — TELMISARTAN 80 MG/1
80 TABLET ORAL DAILY
Qty: 90 TABLET | Refills: 1 | Status: SHIPPED | OUTPATIENT
Start: 2025-08-05

## 2025-08-20 ENCOUNTER — TELEPHONE (OUTPATIENT)
Age: 75
End: 2025-08-20